# Patient Record
Sex: FEMALE | Race: WHITE
[De-identification: names, ages, dates, MRNs, and addresses within clinical notes are randomized per-mention and may not be internally consistent; named-entity substitution may affect disease eponyms.]

---

## 2017-03-13 ENCOUNTER — HOSPITAL (OUTPATIENT)
Dept: OTHER | Age: 62
End: 2017-03-13
Attending: INTERNAL MEDICINE

## 2019-09-24 PROBLEM — R45.89 ANXIETY ABOUT HEALTH: Status: ACTIVE | Noted: 2018-12-18

## 2019-09-24 PROBLEM — R07.89 ATYPICAL CHEST PAIN: Status: ACTIVE | Noted: 2017-06-29

## 2019-09-24 PROBLEM — R51.9 FACIAL DISCOMFORT: Status: ACTIVE | Noted: 2017-03-28

## 2019-09-24 PROBLEM — K58.2 IRRITABLE BOWEL SYNDROME WITH BOTH CONSTIPATION AND DIARRHEA: Status: ACTIVE | Noted: 2018-12-18

## 2019-09-24 PROBLEM — R14.0 BLOATING: Status: ACTIVE | Noted: 2018-12-18

## 2019-09-24 PROBLEM — N95.1 VAGINAL DRYNESS, MENOPAUSAL: Status: ACTIVE | Noted: 2018-01-02

## 2019-09-24 PROBLEM — N94.10 DYSPAREUNIA, FEMALE: Status: ACTIVE | Noted: 2018-01-02

## 2019-09-24 PROBLEM — F41.8 ANXIETY ABOUT HEALTH: Status: ACTIVE | Noted: 2018-12-18

## 2020-02-19 ENCOUNTER — TRANSCRIBE ORDERS (OUTPATIENT)
Dept: ADMINISTRATIVE | Facility: HOSPITAL | Age: 65
End: 2020-02-19

## 2020-02-19 DIAGNOSIS — M79.89 RIGHT LEG SWELLING: Primary | ICD-10-CM

## 2020-02-19 DIAGNOSIS — E04.1 THYROID NODULE: ICD-10-CM

## 2020-02-25 ENCOUNTER — HOSPITAL ENCOUNTER (OUTPATIENT)
Dept: CARDIOLOGY | Facility: HOSPITAL | Age: 65
Discharge: HOME | End: 2020-02-25
Admitting: NURSE PRACTITIONER

## 2020-02-25 ENCOUNTER — HOSPITAL ENCOUNTER (OUTPATIENT)
Dept: ULTRASOUND IMAGING | Facility: HOSPITAL | Age: 65
Discharge: HOME | End: 2020-02-25

## 2020-02-25 DIAGNOSIS — M79.89 RIGHT LEG SWELLING: ICD-10-CM

## 2020-02-25 DIAGNOSIS — E04.1 THYROID NODULE: ICD-10-CM

## 2020-02-25 LAB
BH CV LOWER VASCULAR LEFT COMMON FEMORAL AUGMENT: NORMAL
BH CV LOWER VASCULAR LEFT COMMON FEMORAL COMPETENT: NORMAL
BH CV LOWER VASCULAR LEFT COMMON FEMORAL COMPRESS: NORMAL
BH CV LOWER VASCULAR LEFT COMMON FEMORAL PHASIC: NORMAL
BH CV LOWER VASCULAR LEFT COMMON FEMORAL SPONT: NORMAL
BH CV LOWER VASCULAR RIGHT COMMON FEMORAL AUGMENT: NORMAL
BH CV LOWER VASCULAR RIGHT COMMON FEMORAL COMPETENT: NORMAL
BH CV LOWER VASCULAR RIGHT COMMON FEMORAL COMPRESS: NORMAL
BH CV LOWER VASCULAR RIGHT COMMON FEMORAL PHASIC: NORMAL
BH CV LOWER VASCULAR RIGHT COMMON FEMORAL SPONT: NORMAL
BH CV LOWER VASCULAR RIGHT DISTAL FEMORAL COMPRESS: NORMAL
BH CV LOWER VASCULAR RIGHT GASTRONEMIUS COMPRESS: NORMAL
BH CV LOWER VASCULAR RIGHT GREATER SAPH AK COMPRESS: NORMAL
BH CV LOWER VASCULAR RIGHT GREATER SAPH BK COMPRESS: NORMAL
BH CV LOWER VASCULAR RIGHT LESSER SAPH COMPRESS: NORMAL
BH CV LOWER VASCULAR RIGHT MID FEMORAL AUGMENT: NORMAL
BH CV LOWER VASCULAR RIGHT MID FEMORAL COMPETENT: NORMAL
BH CV LOWER VASCULAR RIGHT MID FEMORAL COMPRESS: NORMAL
BH CV LOWER VASCULAR RIGHT MID FEMORAL PHASIC: NORMAL
BH CV LOWER VASCULAR RIGHT MID FEMORAL SPONT: NORMAL
BH CV LOWER VASCULAR RIGHT PERONEAL COMPRESS: NORMAL
BH CV LOWER VASCULAR RIGHT POPLITEAL AUGMENT: NORMAL
BH CV LOWER VASCULAR RIGHT POPLITEAL COMPETENT: NORMAL
BH CV LOWER VASCULAR RIGHT POPLITEAL COMPRESS: NORMAL
BH CV LOWER VASCULAR RIGHT POPLITEAL PHASIC: NORMAL
BH CV LOWER VASCULAR RIGHT POPLITEAL SPONT: NORMAL
BH CV LOWER VASCULAR RIGHT POSTERIOR TIBIAL COMPRESS: NORMAL
BH CV LOWER VASCULAR RIGHT PROXIMAL FEMORAL COMPRESS: NORMAL
BH CV LOWER VASCULAR RIGHT SAPHENOFEMORAL JUNCTION AUGMENT: NORMAL
BH CV LOWER VASCULAR RIGHT SAPHENOFEMORAL JUNCTION COMPETENT: NORMAL
BH CV LOWER VASCULAR RIGHT SAPHENOFEMORAL JUNCTION COMPRESS: NORMAL
BH CV LOWER VASCULAR RIGHT SAPHENOFEMORAL JUNCTION PHASIC: NORMAL
BH CV LOWER VASCULAR RIGHT SAPHENOFEMORAL JUNCTION SPONT: NORMAL
MAXIMAL PREDICTED HEART RATE: 155 BPM
STRESS TARGET HR: 132 BPM

## 2020-02-25 PROCEDURE — 76536 US EXAM OF HEAD AND NECK: CPT

## 2020-02-25 PROCEDURE — 93971 EXTREMITY STUDY: CPT

## 2020-04-16 ENCOUNTER — OFFICE (OUTPATIENT)
Dept: URBAN - METROPOLITAN AREA CLINIC 64 | Facility: CLINIC | Age: 65
End: 2020-04-16

## 2020-04-16 VITALS — WEIGHT: 110 LBS | HEIGHT: 64 IN

## 2020-04-16 DIAGNOSIS — K59.00 CONSTIPATION, UNSPECIFIED: ICD-10-CM

## 2020-04-16 DIAGNOSIS — K64.8 OTHER HEMORRHOIDS: ICD-10-CM

## 2020-04-16 DIAGNOSIS — K21.9 GASTRO-ESOPHAGEAL REFLUX DISEASE WITHOUT ESOPHAGITIS: ICD-10-CM

## 2020-04-16 DIAGNOSIS — Z86.010 PERSONAL HISTORY OF COLONIC POLYPS: ICD-10-CM

## 2020-04-16 PROCEDURE — 99202 OFFICE O/P NEW SF 15 MIN: CPT | Mod: 95 | Performed by: INTERNAL MEDICINE

## 2020-04-16 RX ORDER — SODIUM SULFATE, POTASSIUM SULFATE, MAGNESIUM SULFATE 17.5; 3.13; 1.6 G/ML; G/ML; G/ML
SOLUTION, CONCENTRATE ORAL
Qty: 1 | Refills: 0 | Status: COMPLETED
Start: 2020-04-16 | End: 2020-10-23

## 2020-04-16 RX ORDER — POLYETHYLENE GLYCOL 3350 17 G/17G
17 POWDER, FOR SOLUTION ORAL
Qty: 1 | Refills: 11 | Status: COMPLETED
Start: 2020-04-16 | End: 2022-10-19

## 2020-04-16 RX ORDER — HYDROCORTISONE 25 MG/G
CREAM TOPICAL
Qty: 28.35 | Refills: 2 | Status: COMPLETED
Start: 2020-04-16 | End: 2024-06-17

## 2020-04-16 NOTE — SERVICEHPINOTES
This is a 65 y/o female who presents for initial evaluation.  She recently moved here in 8/19. She was [place] here. She c/o dyspepsia and change in bowel habits.  Attributes it to diet with increase in sugar consumption and decrease in exercise during the coronavirus pandemic. She previously tended to have constipation for which she takes Miralax.  She has titrated the dose down to 1 tsp qhs.  On this dose, she has regular BMs.  No blood in stool or melena. She uses protoscol HC prn hemorrhoids. She manages GERD with diet.  Exac by eating certain foods such as spicy foods, tomato based foods. She reports h/o adenomatous polyps and diverticulosis as well as int hemorrhoids.  Last colonoscopy 8/2017 in [place] at [facility].  Pt has records that she can copy and mail to us.  She had previously had 2 other colonoscopy exams.  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  She had polyps in 2014 as well.  No known family h/o CRCA or colon polyp.  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  She has colposcopy annually. enalapril dose is 5mg/d she can tolerate iv contrast if premedicated. She reports she is utd on immunizations re: splenectomy.

## 2020-07-09 PROBLEM — D72.829 LEUKOCYTOSIS: Status: ACTIVE | Noted: 2020-07-09

## 2020-07-09 PROBLEM — I10 ESSENTIAL HYPERTENSION: Status: ACTIVE | Noted: 2020-07-09

## 2020-07-09 PROBLEM — R63.4 WEIGHT DECREASED: Status: ACTIVE | Noted: 2020-07-09

## 2020-07-09 PROBLEM — B00.9 HERPESVIRUS INFECTION: Status: ACTIVE | Noted: 2020-07-09

## 2020-07-09 PROBLEM — Z90.81 HISTORY OF SPLENECTOMY: Status: ACTIVE | Noted: 2018-01-08

## 2020-07-09 PROBLEM — I83.90 VARICOSE VEINS OF LOWER EXTREMITY: Status: ACTIVE | Noted: 2018-01-08

## 2020-07-09 RX ORDER — CYCLOSPORINE 0.5 MG/ML
1 EMULSION OPHTHALMIC 3 TIMES DAILY
COMMUNITY

## 2020-07-09 RX ORDER — CLOBETASOL PROPIONATE 0.5 MG/G
1 OINTMENT TOPICAL AS NEEDED
COMMUNITY

## 2020-07-09 RX ORDER — GARLIC EXTRACT 500 MG
1 CAPSULE ORAL DAILY
COMMUNITY
End: 2022-10-10

## 2020-07-09 RX ORDER — MAGNESIUM 200 MG
100 TABLET ORAL DAILY
COMMUNITY

## 2020-07-09 RX ORDER — LORAZEPAM 0.5 MG/1
TABLET ORAL
COMMUNITY
End: 2020-07-10

## 2020-07-09 RX ORDER — ENALAPRIL MALEATE 5 MG/1
TABLET ORAL
COMMUNITY
End: 2020-07-10 | Stop reason: SDUPTHER

## 2020-07-09 RX ORDER — ACYCLOVIR 400 MG/1
TABLET ORAL
COMMUNITY
End: 2020-09-10 | Stop reason: SDUPTHER

## 2020-07-09 RX ORDER — NIACINAMIDE 500 MG
1 TABLET, EXTENDED RELEASE ORAL 3 TIMES DAILY
COMMUNITY

## 2020-07-10 ENCOUNTER — TELEMEDICINE (OUTPATIENT)
Dept: FAMILY MEDICINE CLINIC | Facility: CLINIC | Age: 65
End: 2020-07-10

## 2020-07-10 VITALS — SYSTOLIC BLOOD PRESSURE: 138 MMHG | DIASTOLIC BLOOD PRESSURE: 92 MMHG

## 2020-07-10 DIAGNOSIS — I71.40 ABDOMINAL AORTIC ANEURYSM (AAA) WITHOUT RUPTURE (HCC): ICD-10-CM

## 2020-07-10 DIAGNOSIS — F41.8 ANXIETY ABOUT HEALTH: ICD-10-CM

## 2020-07-10 DIAGNOSIS — I73.9 PERIPHERAL ARTERIAL DISEASE (HCC): Primary | ICD-10-CM

## 2020-07-10 DIAGNOSIS — K58.2 IRRITABLE BOWEL SYNDROME WITH BOTH CONSTIPATION AND DIARRHEA: ICD-10-CM

## 2020-07-10 DIAGNOSIS — I10 ESSENTIAL HYPERTENSION: ICD-10-CM

## 2020-07-10 DIAGNOSIS — R63.4 WEIGHT DECREASED: ICD-10-CM

## 2020-07-10 PROBLEM — H04.123 DRY EYES: Status: ACTIVE | Noted: 2020-07-10

## 2020-07-10 PROBLEM — A60.00 GENITAL HERPES: Status: ACTIVE | Noted: 2020-07-10

## 2020-07-10 PROBLEM — K83.4 SPHINCTER OF ODDI DYSFUNCTION: Status: ACTIVE | Noted: 2020-07-10

## 2020-07-10 PROBLEM — E04.9 GOITER: Status: ACTIVE | Noted: 2020-07-10

## 2020-07-10 PROBLEM — M81.0 OSTEOPOROSIS: Status: ACTIVE | Noted: 2020-07-10

## 2020-07-10 PROBLEM — C51.9 VULVAR CARCINOMA: Status: ACTIVE | Noted: 2020-07-10

## 2020-07-10 PROBLEM — K21.9 GERD (GASTROESOPHAGEAL REFLUX DISEASE): Status: ACTIVE | Noted: 2020-07-10

## 2020-07-10 PROBLEM — K57.90 DIVERTICULOSIS: Status: ACTIVE | Noted: 2020-07-10

## 2020-07-10 PROCEDURE — 99214 OFFICE O/P EST MOD 30 MIN: CPT | Performed by: FAMILY MEDICINE

## 2020-07-10 RX ORDER — DICYCLOMINE HCL 20 MG
20 TABLET ORAL EVERY 6 HOURS PRN
COMMUNITY
Start: 2020-06-17 | End: 2022-02-01 | Stop reason: SDUPTHER

## 2020-07-10 RX ORDER — ROSUVASTATIN CALCIUM 5 MG/1
5 TABLET, COATED ORAL
COMMUNITY
Start: 2020-06-18 | End: 2020-09-10 | Stop reason: SDUPTHER

## 2020-07-10 RX ORDER — ATORVASTATIN CALCIUM 20 MG/1
20 TABLET, FILM COATED ORAL
COMMUNITY
Start: 2020-06-03 | End: 2020-07-10

## 2020-07-10 RX ORDER — LORAZEPAM 1 MG/1
1 TABLET ORAL NIGHTLY PRN
COMMUNITY
End: 2020-09-10 | Stop reason: SDUPTHER

## 2020-07-10 RX ORDER — ENALAPRIL MALEATE 5 MG/1
TABLET ORAL
Start: 2020-07-10 | End: 2020-09-10 | Stop reason: SDUPTHER

## 2020-07-10 NOTE — PROGRESS NOTES
Subjective   Chief Complaint   Patient presents with   • Establish Care   • med review   • Hypertension   • Hyperlipidemia   • discuss test results     Shauna Marks is a 65 y.o. female.     Patient Care Team:  Talia Grey MD as PCP - General (Family Medicine)     You have chosen to receive care through a telehealth visit.  Do you consent to use a video/audio connection for your medical care today? Yes      Patient is being evaluated today through telehealth medicine appointment via the video in view COVID-19 pandemic.  She wants to get established and she wants to review her medical problems and concerns and some blood work results and ultrasounds, which he provided for me prior to this appointment.  She reports that she has been treated for hypertension for quite some time and she is currently on Vasotec 5 mg in the morning and 2-1/2 mg in the evening.  She also has issues with anxiety, GI problems, she is a smoker, however she is trying to cut back.  She apparently was found to have a small aortic aneurysm few years ago [place]up done there.  This was done in 2018.  She had follow-up ultrasound done in early June of this year and this showed that there is some progression of the plaque development in the iliac arteries.  She was then advised by her previous primary care office to start a statin, however she feels very uncomfortable to do that as her cholesterol looks very good and always looked very good.  She provided a copy of her most recent fasting lipid panel for review as well.  She is concerned to start any new medications as she has multiple allergies to medicines.  She has a lot of anxiety and worries about her health and the new medicines.  She has not pretty much left her house since the COVID-19 pandemic started and she feels uncomfortable to going even to doctors offices as she does not want to get this virus.  She denies any chest pains or shortness  of breath.  She also tells me that few years ago she had a colonoscopy and this was complicated by spleen rupture and she had her spleen removed at that time.       The following portions of the patient's history were reviewed and updated as appropriate: allergies, current medications, past family history, past medical history, past social history, past surgical history and problem list.  Past Medical History:   Diagnosis Date   • Anxiety    • GERD (gastroesophageal reflux disease)    • History of transfusion    • Hypertension    • IBS (irritable bowel syndrome)    • Osteoporosis      Past Surgical History:   Procedure Laterality Date   • BREAST SURGERY      biopsies   • CARDIAC CATHETERIZATION     • COLONOSCOPY  2014   • HERNIA REPAIR     • LASER ABLATION CONDYLOMA CERVICAL / VULVAR     • SKIN BIOPSY     • SPLENECTOMY       The patient has a family history of  Family History   Problem Relation Age of Onset   • Lung cancer Mother    • Coronary artery disease Father      Social History     Socioeconomic History   • Marital status: Significant Other     Spouse name: Not on file   • Number of children: Not on file   • Years of education: Not on file   • Highest education level: Not on file   Tobacco Use   • Smoking status: Current Every Day Smoker     Packs/day: 0.50     Types: Cigarettes   • Smokeless tobacco: Never Used   Substance and Sexual Activity   • Alcohol use: Yes     Comment: social   • Drug use: No   • Sexual activity: Defer       Review of Systems   Constitutional: Negative for activity change, appetite change, chills, fatigue, fever, unexpected weight gain and unexpected weight loss.   HENT: Negative for congestion, ear pain, hearing loss, nosebleeds, postnasal drip, swollen glands, tinnitus and trouble swallowing.    Eyes: Negative for blurred vision, double vision and visual disturbance.   Respiratory: Negative for cough, choking, chest tightness, shortness of breath, wheezing and stridor.     Cardiovascular: Negative for chest pain, palpitations and leg swelling.   Gastrointestinal: Positive for nausea and vomiting. Negative for abdominal distention, abdominal pain, anal bleeding, constipation, diarrhea, GERD and indigestion.   Endocrine: Negative for cold intolerance, heat intolerance and polyphagia.   Genitourinary: Negative for dysuria, flank pain, frequency, hematuria and urgency.   Musculoskeletal: Negative for arthralgias, back pain, gait problem, joint swelling and neck pain.   Skin: Negative for color change, dry skin, rash and skin lesions.   Allergic/Immunologic: Negative for environmental allergies and food allergies.   Neurological: Negative for dizziness, tremors, speech difficulty, light-headedness, numbness, headache and confusion.   Hematological: Negative for adenopathy. Does not bruise/bleed easily.   Psychiatric/Behavioral: Negative for decreased concentration, sleep disturbance, depressed mood and stress.     Visit Vitals  /92       Current Outpatient Medications:   •  LORazepam (ATIVAN) 1 MG tablet, Take 1 mg by mouth At Night As Needed., Disp: , Rfl:   •  acyclovir (ZOVIRAX) 400 MG tablet, acyclovir 400 mg tablet, Disp: , Rfl:   •  betamethasone valerate (VALISONE) 0.1 % ointment, betamethasone valerate 0.1 % topical ointment, Disp: , Rfl:   •  clobetasol (TEMOVATE) 0.05 % ointment, clobetasol 0.05 % topical ointment, Disp: , Rfl:   •  cycloSPORINE (Restasis) 0.05 % ophthalmic emulsion, Restasis 0.05 % eye drops in a dropperette, Disp: , Rfl:   •  dicyclomine (BENTYL) 20 MG tablet, Take 20 mg by mouth Every 6 (Six) Hours As Needed., Disp: , Rfl:   •  Digestive Enzymes capsule, , Disp: , Rfl:   •  enalapril (VASOTEC) 5 MG tablet, 1 pill p.o. in the morning and half a pill p.o. in the evening, Disp: , Rfl:   •  hydrocortisone 2.5 % cream, APPLY A SMALL AMOUNT AFFECTED AREA TWICE A DAY, Disp: , Rfl:   •  Lactobacillus Acid-Pectin (ACIDOPHILUS/PECTIN) capsule, Take 1 capsule by  mouth., Disp: , Rfl:   •  Loteprednol Etabonate (Lotemax) 0.5 % ointment, Lotemax 0.5 % eye ointment, Disp: , Rfl:   •  Magnesium 200 MG tablet, , Disp: , Rfl:   •  polyethylene glycol (MIRALAX) powder powder, , Disp: , Rfl:   •  Probiotic Product (PROBIOTIC-10 PO), , Disp: , Rfl:   •  rosuvastatin (CRESTOR) 5 MG tablet, Take 5 mg by mouth every night at bedtime., Disp: , Rfl:     Objective   Physical Exam   Constitutional: She is oriented to person, place, and time. She appears well-developed and well-nourished. No distress.   HENT:   Head: Normocephalic and atraumatic.   Eyes: Pupils are equal, round, and reactive to light. Conjunctivae and EOM are normal.   Neck: Normal range of motion. Neck supple.   Pulmonary/Chest: Effort normal. No respiratory distress.   Musculoskeletal: Normal range of motion.   Neurological: She is alert and oriented to person, place, and time. No cranial nerve deficit.   Skin: She is not diaphoretic.   Psychiatric: She has a normal mood and affect. Judgment and thought content normal.            No visits with results within 7 Day(s) from this visit.   Latest known visit with results is:   Hospital Outpatient Visit on 02/25/2020   Component Date Value Ref Range Status   • Target HR (85%) 02/25/2020 132  bpm Final   • Max. Pred. HR (100%) 02/25/2020 155  bpm Final   • Right Common Femoral Spont 02/25/2020 Y   Final   • Right Common Femoral Phasic 02/25/2020 Y   Final   • Right Common Femoral Augment 02/25/2020 Y   Final   • Right Common Femoral Competent 02/25/2020 Y   Final   • Right Common Femoral Compress 02/25/2020 C   Final   • Right Saphenofemoral Junction Spont 02/25/2020 Y   Final   • Right Saphenofemoral Junction Phas* 02/25/2020 Y   Final   • Right Saphenofemoral Junction Augm* 02/25/2020 Y   Final   • Right Saphenofemoral Junction Comp* 02/25/2020 Y   Final   • Right Saphenofemoral Junction Comp* 02/25/2020 C   Final   • Right Proximal Femoral Compress 02/25/2020 C   Final   •  Right Mid Femoral Spont 02/25/2020 Y   Final   • Right Mid Femoral Phasic 02/25/2020 Y   Final   • Right Mid Femoral Augment 02/25/2020 Y   Final   • Right Mid Femoral Competent 02/25/2020 Y   Final   • Right Mid Femoral Compress 02/25/2020 C   Final   • Right Distal Femoral Compress 02/25/2020 C   Final   • Right Popliteal Spont 02/25/2020 Y   Final   • Right Popliteal Phasic 02/25/2020 Y   Final   • Right Popliteal Augment 02/25/2020 Y   Final   • Right Popliteal Competent 02/25/2020 Y   Final   • Right Popliteal Compress 02/25/2020 C   Final   • Right Posterior Tibial Compress 02/25/2020 C   Final   • Right Peroneal Compress 02/25/2020 C   Final   • Right GastronemiusSoleal Compress 02/25/2020 C   Final   • Right Greater Saph AK Compress 02/25/2020 C   Final   • Right Greater Saph BK Compress 02/25/2020 C   Final   • Right Lesser Saph Compress 02/25/2020 C   Final   • Left Common Femoral Spont 02/25/2020 Y   Final   • Left Common Femoral Phasic 02/25/2020 Y   Final   • Left Common Femoral Augment 02/25/2020 Y   Final   • Left Common Femoral Competent 02/25/2020 Y   Final   • Left Common Femoral Compress 02/25/2020 C   Final         Lab Results   Component Value Date     (H) 05/21/2020    BUN 11 05/21/2020    CREATININE 0.7 05/21/2020     05/21/2020    K 4.9 05/21/2020    CL 98 05/21/2020    CALCIUM 10.1 05/21/2020    ALBUMIN 4.7 05/21/2020    BILITOT 0.4 05/21/2020    ALKPHOS 73 05/21/2020    AST 33 05/21/2020    ALT 15 05/21/2020    CHLPL 190 05/21/2020    TRIG 58 05/21/2020    HDL 68 05/21/2020    VLDL 12 05/21/2020     (H) 05/21/2020    WBC 11.46 (H) 05/21/2020    RBC 4.62 05/21/2020    HCT 46.4 (H) 05/21/2020    .4 (H) 05/21/2020    MCH 32.9 05/21/2020          Assessment/Plan   Problems Addressed this Visit        Cardiovascular and Mediastinum    Essential hypertension    Relevant Medications    enalapril (VASOTEC) 5 MG tablet    Abdominal aortic aneurysm (AAA) (CMS/[facility])     Peripheral arterial disease (CMS/HCC) - Primary    Relevant Orders    Ambulatory Referral to Vascular Surgery       Digestive    Irritable bowel syndrome with both constipation and diarrhea       Other    Anxiety about health    Weight decreased        I reviewed her medical problems and her medications and I tried to address all of her concerns as well as are good at this one video appointment.  I reviewed the records she provided for me prior to that appointment including 2 different abdominal ultrasounds from 2018 and one from  of this year.  There seems to be a progression of the plaque buildup in the iliac artery.  Her fasting lipid panel looks very good, however I tried to explain to her that even though her numbers look good there is still a progress of the deposition of cholesterol in her veins and concerned about the progression if we do not modify and control medical risks including her hypertension, cholesterol, and the smoking, which is a future risk of that.  Smoking cessation was discussed and stressed.  We went back and forth for quite some time asking and answering questions.  I also suggested for her possibly to see a vascular specialist to address that and she agreed to that and referral was put in.  I asked for her to provide more medical records if available including her most recent colonoscopy.  She will be scheduling another follow-up appointment with me for reevaluation ideally in person, however if not comfortable with that then she can do video appointment in about 1 month.      Patient was evaluated today through telehealth medicine appointment via the video.  Total evaluation time was 45 minutes.             Requested Prescriptions     Signed Prescriptions Disp Refills   • enalapril (VASOTEC) 5 MG tablet       Si pill p.o. in the morning and half a pill p.o. in the evening

## 2020-07-11 PROBLEM — R07.89 ATYPICAL CHEST PAIN: Status: RESOLVED | Noted: 2017-06-29 | Resolved: 2020-07-11

## 2020-07-11 PROBLEM — R51.9 FACIAL DISCOMFORT: Status: RESOLVED | Noted: 2017-03-28 | Resolved: 2020-07-11

## 2020-07-11 PROBLEM — C51.9 VULVAR CARCINOMA: Status: RESOLVED | Noted: 2020-07-10 | Resolved: 2020-07-11

## 2020-07-11 PROBLEM — I73.9 PERIPHERAL ARTERIAL DISEASE: Status: ACTIVE | Noted: 2020-07-11

## 2020-08-25 ENCOUNTER — APPOINTMENT (OUTPATIENT)
Dept: VASCULAR SURGERY | Facility: HOSPITAL | Age: 65
End: 2020-08-25

## 2020-08-25 PROCEDURE — G0463 HOSPITAL OUTPT CLINIC VISIT: HCPCS

## 2020-09-04 ENCOUNTER — TELEMEDICINE (OUTPATIENT)
Dept: FAMILY MEDICINE CLINIC | Facility: CLINIC | Age: 65
End: 2020-09-04

## 2020-09-04 DIAGNOSIS — M54.9 UPPER BACK PAIN ON RIGHT SIDE: Primary | ICD-10-CM

## 2020-09-04 DIAGNOSIS — R07.89 CHEST WALL PAIN: ICD-10-CM

## 2020-09-04 PROBLEM — I70.90 ATHEROSCLEROSIS: Status: ACTIVE | Noted: 2020-09-04

## 2020-09-04 PROCEDURE — 99213 OFFICE O/P EST LOW 20 MIN: CPT | Performed by: FAMILY MEDICINE
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
## 2020-09-10 ENCOUNTER — LAB (OUTPATIENT)
Dept: FAMILY MEDICINE CLINIC | Facility: CLINIC | Age: 65
End: 2020-09-10

## 2020-09-10 ENCOUNTER — OFFICE VISIT (OUTPATIENT)
Dept: FAMILY MEDICINE CLINIC | Facility: CLINIC | Age: 65
End: 2020-09-10

## 2020-09-10 VITALS
WEIGHT: 105 LBS | TEMPERATURE: 97.5 F | OXYGEN SATURATION: 98 % | HEIGHT: 64 IN | RESPIRATION RATE: 16 BRPM | HEART RATE: 76 BPM | SYSTOLIC BLOOD PRESSURE: 144 MMHG | DIASTOLIC BLOOD PRESSURE: 73 MMHG | BODY MASS INDEX: 17.93 KG/M2

## 2020-09-10 DIAGNOSIS — I10 ESSENTIAL HYPERTENSION: ICD-10-CM

## 2020-09-10 DIAGNOSIS — Z78.0 POSTMENOPAUSAL: ICD-10-CM

## 2020-09-10 DIAGNOSIS — Z00.00 MEDICARE ANNUAL WELLNESS VISIT, SUBSEQUENT: Primary | ICD-10-CM

## 2020-09-10 DIAGNOSIS — Z12.31 VISIT FOR SCREENING MAMMOGRAM: ICD-10-CM

## 2020-09-10 DIAGNOSIS — F41.9 ANXIETY: ICD-10-CM

## 2020-09-10 DIAGNOSIS — E55.9 VITAMIN D DEFICIENCY: ICD-10-CM

## 2020-09-10 DIAGNOSIS — I73.9 PERIPHERAL ARTERIAL DISEASE (HCC): ICD-10-CM

## 2020-09-10 DIAGNOSIS — L65.9 HAIR LOSS: ICD-10-CM

## 2020-09-10 LAB
25(OH)D3 SERPL-MCNC: 48.9 NG/ML (ref 30–100)
ALBUMIN SERPL-MCNC: 4.8 G/DL (ref 3.5–5.2)
ALBUMIN/GLOB SERPL: 1.7 G/DL
ALP SERPL-CCNC: 75 U/L (ref 39–117)
ALT SERPL W P-5'-P-CCNC: 15 U/L (ref 1–33)
ANION GAP SERPL CALCULATED.3IONS-SCNC: 9.9 MMOL/L (ref 5–15)
AST SERPL-CCNC: 25 U/L (ref 1–32)
BASOPHILS # BLD AUTO: 0.15 10*3/MM3 (ref 0–0.2)
BASOPHILS NFR BLD AUTO: 1.2 % (ref 0–1.5)
BILIRUB SERPL-MCNC: 0.4 MG/DL (ref 0–1.2)
BILIRUB UR QL STRIP: NEGATIVE
BUN SERPL-MCNC: 10 MG/DL (ref 8–23)
BUN/CREAT SERPL: 13 (ref 7–25)
CALCIUM SPEC-SCNC: 9.5 MG/DL (ref 8.6–10.5)
CHLORIDE SERPL-SCNC: 99 MMOL/L (ref 98–107)
CHOLEST SERPL-MCNC: 138 MG/DL (ref 0–200)
CHROMATIN AB SERPL-ACNC: <10 IU/ML (ref 0–14)
CLARITY UR: CLEAR
CO2 SERPL-SCNC: 29.1 MMOL/L (ref 22–29)
COLOR UR: YELLOW
CREAT SERPL-MCNC: 0.77 MG/DL (ref 0.57–1)
CRP SERPL-MCNC: 0.09 MG/DL (ref 0–0.5)
DEPRECATED RDW RBC AUTO: 43.2 FL (ref 37–54)
EOSINOPHIL # BLD AUTO: 0.14 10*3/MM3 (ref 0–0.4)
EOSINOPHIL NFR BLD AUTO: 1.1 % (ref 0.3–6.2)
ERYTHROCYTE [DISTWIDTH] IN BLOOD BY AUTOMATED COUNT: 12.2 % (ref 12.3–15.4)
ERYTHROCYTE [SEDIMENTATION RATE] IN BLOOD: 5 MM/HR (ref 0–30)
FERRITIN SERPL-MCNC: 121 NG/ML (ref 13–150)
GFR SERPL CREATININE-BSD FRML MDRD: 75 ML/MIN/1.73
GLOBULIN UR ELPH-MCNC: 2.9 GM/DL
GLUCOSE SERPL-MCNC: 102 MG/DL (ref 65–99)
GLUCOSE UR STRIP-MCNC: NEGATIVE MG/DL
HCT VFR BLD AUTO: 41.5 % (ref 34–46.6)
HDLC SERPL-MCNC: 59 MG/DL (ref 40–60)
HGB BLD-MCNC: 14.1 G/DL (ref 12–15.9)
HGB UR QL STRIP.AUTO: NEGATIVE
IMM GRANULOCYTES # BLD AUTO: 0.04 10*3/MM3 (ref 0–0.05)
IMM GRANULOCYTES NFR BLD AUTO: 0.3 % (ref 0–0.5)
IRON 24H UR-MRATE: 88 MCG/DL (ref 37–145)
IRON SATN MFR SERPL: 23 % (ref 20–50)
KETONES UR QL STRIP: NEGATIVE
LDLC SERPL CALC-MCNC: 68 MG/DL (ref 0–100)
LDLC/HDLC SERPL: 1.15 {RATIO}
LEUKOCYTE ESTERASE UR QL STRIP.AUTO: NEGATIVE
LYMPHOCYTES # BLD AUTO: 2.68 10*3/MM3 (ref 0.7–3.1)
LYMPHOCYTES NFR BLD AUTO: 21 % (ref 19.6–45.3)
MCH RBC QN AUTO: 32.6 PG (ref 26.6–33)
MCHC RBC AUTO-ENTMCNC: 34 G/DL (ref 31.5–35.7)
MCV RBC AUTO: 96.1 FL (ref 79–97)
MONOCYTES # BLD AUTO: 1.04 10*3/MM3 (ref 0.1–0.9)
MONOCYTES NFR BLD AUTO: 8.2 % (ref 5–12)
NEUTROPHILS NFR BLD AUTO: 68.2 % (ref 42.7–76)
NEUTROPHILS NFR BLD AUTO: 8.69 10*3/MM3 (ref 1.7–7)
NITRITE UR QL STRIP: NEGATIVE
NRBC BLD AUTO-RTO: 0 /100 WBC (ref 0–0.2)
PH UR STRIP.AUTO: 7.5 [PH] (ref 5–8)
PLATELET # BLD AUTO: 297 10*3/MM3 (ref 140–450)
PMV BLD AUTO: 12.9 FL (ref 6–12)
POTASSIUM SERPL-SCNC: 4.2 MMOL/L (ref 3.5–5.2)
PROT SERPL-MCNC: 7.7 G/DL (ref 6–8.5)
PROT UR QL STRIP: NEGATIVE
RBC # BLD AUTO: 4.32 10*6/MM3 (ref 3.77–5.28)
SODIUM SERPL-SCNC: 138 MMOL/L (ref 136–145)
SP GR UR STRIP: 1.01 (ref 1–1.03)
TIBC SERPL-MCNC: 383 MCG/DL (ref 298–536)
TRANSFERRIN SERPL-MCNC: 257 MG/DL (ref 200–360)
TRIGL SERPL-MCNC: 55 MG/DL (ref 0–150)
TSH SERPL DL<=0.05 MIU/L-ACNC: 1.08 UIU/ML (ref 0.27–4.2)
UROBILINOGEN UR QL STRIP: NORMAL
VLDLC SERPL-MCNC: 11 MG/DL (ref 5–40)
WBC # BLD AUTO: 12.74 10*3/MM3 (ref 3.4–10.8)

## 2020-09-10 PROCEDURE — G0439 PPPS, SUBSEQ VISIT: HCPCS | Performed by: FAMILY MEDICINE

## 2020-09-10 PROCEDURE — 85652 RBC SED RATE AUTOMATED: CPT | Performed by: FAMILY MEDICINE

## 2020-09-10 PROCEDURE — 84443 ASSAY THYROID STIM HORMONE: CPT | Performed by: FAMILY MEDICINE

## 2020-09-10 PROCEDURE — 36415 COLL VENOUS BLD VENIPUNCTURE: CPT | Performed by: FAMILY MEDICINE

## 2020-09-10 PROCEDURE — 85025 COMPLETE CBC W/AUTO DIFF WBC: CPT | Performed by: FAMILY MEDICINE

## 2020-09-10 PROCEDURE — 82728 ASSAY OF FERRITIN: CPT | Performed by: FAMILY MEDICINE

## 2020-09-10 PROCEDURE — 86140 C-REACTIVE PROTEIN: CPT | Performed by: FAMILY MEDICINE

## 2020-09-10 PROCEDURE — 86431 RHEUMATOID FACTOR QUANT: CPT | Performed by: FAMILY MEDICINE

## 2020-09-10 PROCEDURE — 80053 COMPREHEN METABOLIC PANEL: CPT | Performed by: FAMILY MEDICINE

## 2020-09-10 PROCEDURE — 84466 ASSAY OF TRANSFERRIN: CPT | Performed by: FAMILY MEDICINE

## 2020-09-10 PROCEDURE — 80061 LIPID PANEL: CPT | Performed by: FAMILY MEDICINE

## 2020-09-10 PROCEDURE — 86038 ANTINUCLEAR ANTIBODIES: CPT | Performed by: FAMILY MEDICINE

## 2020-09-10 PROCEDURE — 82306 VITAMIN D 25 HYDROXY: CPT | Performed by: FAMILY MEDICINE

## 2020-09-10 PROCEDURE — 81003 URINALYSIS AUTO W/O SCOPE: CPT | Performed by: FAMILY MEDICINE

## 2020-09-10 PROCEDURE — 83540 ASSAY OF IRON: CPT | Performed by: FAMILY MEDICINE

## 2020-09-10 RX ORDER — LORAZEPAM 1 MG/1
1 TABLET ORAL NIGHTLY PRN
Qty: 45 TABLET | Refills: 0 | Status: SHIPPED | OUTPATIENT
Start: 2020-09-10 | End: 2020-10-06 | Stop reason: SDUPTHER

## 2020-09-10 RX ORDER — ENALAPRIL MALEATE 5 MG/1
TABLET ORAL
Qty: 135 TABLET | Refills: 1 | Status: SHIPPED | OUTPATIENT
Start: 2020-09-10 | End: 2021-03-04 | Stop reason: SDUPTHER

## 2020-09-10 RX ORDER — ACYCLOVIR 400 MG/1
400 TABLET ORAL DAILY
Qty: 90 TABLET | Refills: 1 | Status: SHIPPED | OUTPATIENT
Start: 2020-09-10 | End: 2020-10-30 | Stop reason: SDUPTHER

## 2020-09-10 RX ORDER — ROSUVASTATIN CALCIUM 5 MG/1
5 TABLET, COATED ORAL
Qty: 90 TABLET | Refills: 1 | Status: SHIPPED | OUTPATIENT
Start: 2020-09-10 | End: 2021-04-14 | Stop reason: SDUPTHER

## 2020-09-10 NOTE — PROGRESS NOTES
Subsequent Medicare Wellness Visit   The ABC's of the Annual Wellness Visit    Chief Complaint   Patient presents with   • Medicare Wellness-subsequent       HPI:  Shauna Marks, -1955, is a 65 y.o. female who presents for a Subsequent Medicare Wellness Visit.  She is overall doing pretty good, she was recently started on Crestor due to some calcium buildup on her recent vascular ultrasounds.  She is still taking only 2.5 mg as she is very much concerned about possible side effects.  She has noted for some time some hair loss and she is concerned that this might be due to some of her medicines.  Specifically she thinks this might be due to enalapril.  She is a smoker and has been for a long time.  She fairly recently moved to this area and she tells me that her last mammogram was in 2019.  She is due for colonoscopy and she already scheduled the test to be done later this year.  She reports that her last bone density was about 2 years ago and she had some bone loss noted.  She denies any issues with depression or memory problems.  She however has a lot of anxiety, which typically gets worse at night and she takes lorazepam at night as needed, however she is trying not to take it every night.  She denies any balance issues or any falls. Patient denies any chest pain, shortness of breath, dizziness, nausea, vomiting, or diarrhea. No visual issues reported. No headaches, numbness or tingling. No urinary issues. Patient has good appetite and denies any weight changes. No swelling reported. No emotional issues.      Recent Hospitalizations:  No hospitalization(s) within the last year..    Current Medical Providers:  Patient Care Team:  Talia Grey MD as PCP - General (Family Medicine)  Asa Greenwood MD as Consulting Physician (Vascular Surgery)    Health Habits and Functional and Cognitive Screening and Depression Screening:  Functional & Cognitive Status 9/10/2020   Do you have  "difficulty preparing food and eating? No   Do you have difficulty bathing yourself, getting dressed or grooming yourself? No   Do you have difficulty using the toilet? No   Do you have difficulty moving around from place to place? No   Do you have trouble with steps or getting out of a bed or a chair? No   Current Diet Well Balanced Diet   Dental Exam Up to date   Eye Exam Up to date   Exercise (times per week) 0 times per week   Current Exercise Activities Include None   Do you need help using the phone?  No   Are you deaf or do you have serious difficulty hearing?  No   Do you need help with transportation? No   Do you need help shopping? No   Do you need help preparing meals?  No   Do you need help with housework?  No   Do you need help with laundry? No   Do you need help taking your medications? No   Do you need help managing money? No   Do you ever drive or ride in a car without wearing a seat belt? No   Have you felt unusual stress, anger or loneliness in the last month? No   Who do you live with? Spouse   If you need help, do you have trouble finding someone available to you? No   Have you been bothered in the last four weeks by sexual problems? No   Do you have difficulty concentrating, remembering or making decisions? No       Compared to one year ago, the patient feels her physical health is the same and her mental health is the same.    Depression Screen:  PHQ-2/PHQ-9 Depression Screening 9/10/2020   Little interest or pleasure in doing things 0   Feeling down, depressed, or hopeless 0   Total Score 0         Past Medical/Family/Social History:  The following portions of the patient's history were reviewed and updated as appropriate: allergies, current medications, past family history, past medical history, past social history, past surgical history and problem list.    Allergies   Allergen Reactions   • Amoxicillin Hives and Swelling     Hives, swelling   • Cefoxitin Other (See Comments)     \"blood " "pressure bottoms out\"   • Ciprofloxacin Rash   • Contrast Dye Hives, Nausea And Vomiting and Shortness Of Breath   • Hydromorphone Hcl Hallucinations and Rash   • Iodinated Diagnostic Agents Rash   • Propafenone Other (See Comments)     Facial numbness   • Propofol Headache   • Azithromycin Unknown - High Severity   • Cephalexin Unknown - High Severity   • Fentanyl Other (See Comments)   • Hydromorphone Confusion   • Sulfa Antibiotics Nausea And Vomiting   • Sulfacetamide Nausea And Vomiting   • Latex Rash         Current Outpatient Medications:   •  acyclovir (ZOVIRAX) 400 MG tablet, Take 1 tablet by mouth Daily. Take no more than 5 doses a day., Disp: 90 tablet, Rfl: 1  •  betamethasone valerate (VALISONE) 0.1 % ointment, betamethasone valerate 0.1 % topical ointment, Disp: , Rfl:   •  clobetasol (TEMOVATE) 0.05 % ointment, clobetasol 0.05 % topical ointment, Disp: , Rfl:   •  cycloSPORINE (Restasis) 0.05 % ophthalmic emulsion, Restasis 0.05 % eye drops in a dropperette, Disp: , Rfl:   •  dicyclomine (BENTYL) 20 MG tablet, Take 20 mg by mouth Every 6 (Six) Hours As Needed., Disp: , Rfl:   •  Digestive Enzymes capsule, , Disp: , Rfl:   •  enalapril (VASOTEC) 5 MG tablet, 1 pill p.o. in the morning and half a pill p.o. in the evening, Disp: 135 tablet, Rfl: 1  •  hydrocortisone 2.5 % cream, APPLY A SMALL AMOUNT AFFECTED AREA TWICE A DAY, Disp: , Rfl:   •  Lactobacillus Acid-Pectin (ACIDOPHILUS/PECTIN) capsule, Take 1 capsule by mouth., Disp: , Rfl:   •  LORazepam (ATIVAN) 1 MG tablet, Take 1 tablet by mouth At Night As Needed for Anxiety., Disp: 45 tablet, Rfl: 0  •  Loteprednol Etabonate (Lotemax) 0.5 % ointment, Lotemax 0.5 % eye ointment, Disp: , Rfl:   •  Magnesium 200 MG tablet, , Disp: , Rfl:   •  polyethylene glycol (MIRALAX) powder powder, , Disp: , Rfl:   •  Probiotic Product (PROBIOTIC-10 PO), , Disp: , Rfl:   •  rosuvastatin (CRESTOR) 5 MG tablet, Take 1 tablet by mouth every night at bedtime., Disp: 90 " tablet, Rfl: 1    Aspirin use counseling: Does not need ASA (and currently is not on it)    Current medication list contains no high risk medications.  No harmful drug interactions have been identified.     Family History   Problem Relation Age of Onset   • Lung cancer Mother    • Coronary artery disease Father        Social History     Tobacco Use   • Smoking status: Current Every Day Smoker     Packs/day: 0.50     Types: Cigarettes   • Smokeless tobacco: Never Used   Substance Use Topics   • Alcohol use: Yes     Comment: social       Past Surgical History:   Procedure Laterality Date   • BREAST SURGERY      biopsies   • CARDIAC CATHETERIZATION     • COLONOSCOPY  2014   • HERNIA REPAIR     • LASER ABLATION CONDYLOMA CERVICAL / VULVAR     • SKIN BIOPSY     • SPLENECTOMY  2014       Patient Active Problem List   Diagnosis   • Anxiety about health   • Bloating   • Carcinoma in situ of vulva   • Dyspareunia, female   • Fatigue   • Hypertrophic scar of skin   • Irritable bowel syndrome with both constipation and diarrhea   • Vaginal dryness, menopausal   • Weight decreased   • Varicose veins of lower extremity   • OAB (overactive bladder)   • Leukocytosis   • History of splenectomy   • Herpesvirus infection   • Essential hypertension   • Abdominal aortic aneurysm (AAA) (CMS/HCC)   • Sphincter of Oddi dysfunction   • Osteoporosis   • Goiter   • GERD (gastroesophageal reflux disease)   • Genital herpes   • Dry eyes   • Diverticulosis   • Peripheral arterial disease (CMS/HCC)   • Atherosclerosis   • Chest wall pain   • Upper back pain on right side   • Medicare annual wellness visit, subsequent   • Vitamin D deficiency   • Visit for screening mammogram   • Postmenopausal   • Hair loss   • Anxiety       Review of Systems   Constitutional: Negative for activity change, fatigue and fever.   Respiratory: Negative for cough, shortness of breath and wheezing.    Cardiovascular: Negative for chest pain, palpitations and leg  "swelling.   Gastrointestinal: Negative for constipation, diarrhea and indigestion.   Skin: Negative for color change, dry skin and rash.   Neurological: Negative for tremors and headache.       Objective     Vitals:    09/10/20 0941   BP: 144/73   BP Location: Left arm   Patient Position: Sitting   Cuff Size: Adult   Pulse: 76   Resp: 16   Temp: 97.5 °F (36.4 °C)   TempSrc: Temporal   SpO2: 98%   Weight: 47.6 kg (105 lb)   Height: 162.6 cm (64\")       Patient's Body mass index is 18.02 kg/m². BMI is above normal parameters. Recommendations include: educational material.      No exam data present    The patient has no evidence of cognitve impairment.     Physical Exam   Constitutional: She is oriented to person, place, and time. She appears well-developed and well-nourished.   HENT:   Head: Normocephalic and atraumatic.   Eyes: Pupils are equal, round, and reactive to light. Conjunctivae and EOM are normal.   Neck: Normal range of motion. Neck supple.   Cardiovascular: Normal rate, regular rhythm, normal heart sounds and intact distal pulses. Exam reveals no gallop.   No murmur heard.  Pulmonary/Chest: Effort normal and breath sounds normal. No respiratory distress. She has no rales. She exhibits no tenderness.   Musculoskeletal: Normal range of motion. She exhibits no edema or deformity.   Neurological: She is alert and oriented to person, place, and time.   Skin: Skin is warm and dry.   Nursing note and vitals reviewed.      Recent Lab Results:  Lab Results   Component Value Date     (H) 05/21/2020     Lab Results   Component Value Date    TRIG 58 05/21/2020    HDL 68 05/21/2020    VLDL 12 05/21/2020       Assessment/Plan   Age-appropriate Screening Schedule:  Refer to the list below for future screening recommendations based on patient's age, sex and/or medical conditions.      Health Maintenance   Topic Date Due   • MAMMOGRAM  1955   • TDAP/TD VACCINES (1 - Tdap) 04/28/1965   • ZOSTER VACCINE (1 of " 2) 04/28/2004   • DXA SCAN  07/09/2020   • COLONOSCOPY  07/09/2020   • INFLUENZA VACCINE  08/01/2020       Medicare Risks and Personalized Health Plan:  Advance Directive Discussion  Breast Cancer/Mammogram Screening  Cardiovascular risk  Chronic Pain   Colon Cancer Screening  Dementia/Memory   Depression/Dysphoria  Diabetic Lab Screening   Fall Risk  Osteoprorosis Risk      CMS-Preventive Services Quick Reference  Medicare Preventive Services Addressed:  Annual Wellness Visit (AWV)  Bone Density Measurements  Cardiovascular Disease Screening Tests (may do this order every 5 years in beneficiaries without signs or symptoms of cardiovascular disease)  Colorectal Cancer Screening, Colonoscopy  Screening Mammography     Advance Care Planning:  ACP discussion was held with the patient during this visit. Patient has an advance directive in EMR which is still valid.     Diagnoses and all orders for this visit:    1. Medicare annual wellness visit, subsequent (Primary)    2. Peripheral arterial disease (CMS/HCC)    3. Essential hypertension  -     CBC Auto Differential  -     Comprehensive Metabolic Panel  -     Lipid Panel  -     TSH  -     Urinalysis With Culture If Indicated - Urine, Clean Catch    4. Vitamin D deficiency  -     Vitamin D 25 Hydroxy    5. Postmenopausal  -     DEXA Bone Density Axial; Future    6. Visit for screening mammogram  -     Mammo Screening Digital Tomosynthesis Bilateral With CAD; Future    7. Hair loss  -     Ferritin  -     Iron Profile  -     MELODIE; Future  -     Sedimentation Rate  -     Rheumatoid Factor; Future  -     C-reactive Protein    8. Anxiety  -     LORazepam (ATIVAN) 1 MG tablet; Take 1 tablet by mouth At Night As Needed for Anxiety.  Dispense: 45 tablet; Refill: 0    Other orders  -     rosuvastatin (CRESTOR) 5 MG tablet; Take 1 tablet by mouth every night at bedtime.  Dispense: 90 tablet; Refill: 1  -     acyclovir (ZOVIRAX) 400 MG tablet; Take 1 tablet by mouth Daily. Take no  more than 5 doses a day.  Dispense: 90 tablet; Refill: 1  -     enalapril (VASOTEC) 5 MG tablet; 1 pill p.o. in the morning and half a pill p.o. in the evening  Dispense: 135 tablet; Refill: 1    Medicare wellness exam was done today.  I reviewed her medical problems and her medications.  I will be getting fasting blood work today.  I also reviewed her health maintenance.  She was given order for mammogram and a bone density and she will be scheduling these tests down the road.  She is scheduled for colonoscopy later this year as well.  I also reviewed her immunization.  She was advised to get a flu shot and she wants to do it later into this season.  She also provided some immunization records and it looks like she already had both of her pneumonia shots.  She does not want to get a shingles shot at this point.  Smoking cessation was discussed and recommended.  Healthy lifestyle was also discussed and reinforced.    An After Visit Summary and PPPS with all of these plans were given to the patient.      Follow Up:  Return in about 6 months (around 3/10/2021) for Next scheduled follow up.

## 2020-09-11 LAB — ANA SER QL: NEGATIVE

## 2020-10-06 ENCOUNTER — CLINICAL SUPPORT (OUTPATIENT)
Dept: FAMILY MEDICINE CLINIC | Facility: CLINIC | Age: 65
End: 2020-10-06

## 2020-10-06 DIAGNOSIS — Z23 NEED FOR IMMUNIZATION AGAINST INFLUENZA: Primary | ICD-10-CM

## 2020-10-06 DIAGNOSIS — F41.9 ANXIETY: ICD-10-CM

## 2020-10-06 PROCEDURE — 90686 IIV4 VACC NO PRSV 0.5 ML IM: CPT | Performed by: FAMILY MEDICINE

## 2020-10-06 PROCEDURE — G0008 ADMIN INFLUENZA VIRUS VAC: HCPCS | Performed by: FAMILY MEDICINE

## 2020-10-06 RX ORDER — LORAZEPAM 1 MG/1
1 TABLET ORAL NIGHTLY PRN
Qty: 30 TABLET | Refills: 0 | Status: SHIPPED | OUTPATIENT
Start: 2020-10-06 | End: 2020-11-08

## 2020-10-19 RX ORDER — ASCORBIC ACID 500 MG
1000 TABLET ORAL DAILY
COMMUNITY

## 2020-10-19 RX ORDER — MULTIPLE VITAMINS W/ MINERALS TAB 9MG-400MCG
1 TAB ORAL DAILY
COMMUNITY

## 2020-10-19 RX ORDER — UBIDECARENONE 100 MG
100 CAPSULE ORAL DAILY
COMMUNITY
End: 2021-03-16

## 2020-10-23 ENCOUNTER — OFFICE (OUTPATIENT)
Dept: URBAN - METROPOLITAN AREA CLINIC 64 | Facility: CLINIC | Age: 65
End: 2020-10-23

## 2020-10-23 VITALS — WEIGHT: 106 LBS | HEIGHT: 64 IN

## 2020-10-23 DIAGNOSIS — F41.9 ANXIETY DISORDER, UNSPECIFIED: ICD-10-CM

## 2020-10-23 DIAGNOSIS — K57.90 DIVERTICULOSIS OF INTESTINE, PART UNSPECIFIED, WITHOUT PERFO: ICD-10-CM

## 2020-10-23 DIAGNOSIS — K59.00 CONSTIPATION, UNSPECIFIED: ICD-10-CM

## 2020-10-23 DIAGNOSIS — Z86.010 PERSONAL HISTORY OF COLONIC POLYPS: ICD-10-CM

## 2020-10-23 PROCEDURE — 99213 OFFICE O/P EST LOW 20 MIN: CPT | Performed by: INTERNAL MEDICINE

## 2020-10-26 ENCOUNTER — LAB (OUTPATIENT)
Dept: LAB | Facility: HOSPITAL | Age: 65
End: 2020-10-26

## 2020-10-26 PROCEDURE — U0004 COV-19 TEST NON-CDC HGH THRU: HCPCS

## 2020-10-26 PROCEDURE — C9803 HOPD COVID-19 SPEC COLLECT: HCPCS

## 2020-10-27 ENCOUNTER — ANESTHESIA EVENT (OUTPATIENT)
Dept: GASTROENTEROLOGY | Facility: HOSPITAL | Age: 65
End: 2020-10-27

## 2020-10-27 LAB — SARS-COV-2 RNA RESP QL NAA+PROBE: NOT DETECTED

## 2020-10-28 ENCOUNTER — HOSPITAL ENCOUNTER (OUTPATIENT)
Facility: HOSPITAL | Age: 65
Setting detail: HOSPITAL OUTPATIENT SURGERY
Discharge: HOME | End: 2020-10-28
Attending: INTERNAL MEDICINE | Admitting: INTERNAL MEDICINE

## 2020-10-28 ENCOUNTER — ANESTHESIA (OUTPATIENT)
Dept: GASTROENTEROLOGY | Facility: HOSPITAL | Age: 65
End: 2020-10-28

## 2020-10-28 ENCOUNTER — ON CAMPUS - OUTPATIENT (OUTPATIENT)
Dept: URBAN - METROPOLITAN AREA HOSPITAL 85 | Facility: HOSPITAL | Age: 65
End: 2020-10-28

## 2020-10-28 VITALS
HEART RATE: 70 BPM | DIASTOLIC BLOOD PRESSURE: 66 MMHG | TEMPERATURE: 98.2 F | RESPIRATION RATE: 14 BRPM | BODY MASS INDEX: 17.84 KG/M2 | WEIGHT: 104.5 LBS | OXYGEN SATURATION: 97 % | SYSTOLIC BLOOD PRESSURE: 116 MMHG | HEIGHT: 64 IN

## 2020-10-28 DIAGNOSIS — D12.2 BENIGN NEOPLASM OF ASCENDING COLON: ICD-10-CM

## 2020-10-28 DIAGNOSIS — K21.9 GERD (GASTROESOPHAGEAL REFLUX DISEASE): ICD-10-CM

## 2020-10-28 DIAGNOSIS — Z86.010 HISTORY OF COLON POLYPS: ICD-10-CM

## 2020-10-28 DIAGNOSIS — Z86.010 PERSONAL HISTORY OF COLONIC POLYPS: ICD-10-CM

## 2020-10-28 DIAGNOSIS — K57.30 DIVERTICULOSIS OF LARGE INTESTINE WITHOUT PERFORATION OR ABS: ICD-10-CM

## 2020-10-28 PROCEDURE — 88305 TISSUE EXAM BY PATHOLOGIST: CPT | Performed by: INTERNAL MEDICINE

## 2020-10-28 PROCEDURE — 45385 COLONOSCOPY W/LESION REMOVAL: CPT | Mod: PT | Performed by: INTERNAL MEDICINE

## 2020-10-28 PROCEDURE — 25010000002 PROPOFOL 200 MG/20ML EMULSION: Performed by: ANESTHESIOLOGY

## 2020-10-28 RX ORDER — ONDANSETRON 2 MG/ML
4 INJECTION INTRAMUSCULAR; INTRAVENOUS ONCE AS NEEDED
Status: DISCONTINUED | OUTPATIENT
Start: 2020-10-28 | End: 2020-10-28 | Stop reason: HOSPADM

## 2020-10-28 RX ORDER — SODIUM CHLORIDE 9 MG/ML
30 INJECTION, SOLUTION INTRAVENOUS CONTINUOUS PRN
Status: DISCONTINUED | OUTPATIENT
Start: 2020-10-28 | End: 2020-10-28 | Stop reason: HOSPADM

## 2020-10-28 RX ORDER — SODIUM CHLORIDE 0.9 % (FLUSH) 0.9 %
10 SYRINGE (ML) INJECTION AS NEEDED
Status: DISCONTINUED | OUTPATIENT
Start: 2020-10-28 | End: 2020-10-28 | Stop reason: HOSPADM

## 2020-10-28 RX ORDER — SODIUM CHLORIDE 0.9 % (FLUSH) 0.9 %
3 SYRINGE (ML) INJECTION EVERY 12 HOURS SCHEDULED
Status: DISCONTINUED | OUTPATIENT
Start: 2020-10-28 | End: 2020-10-28 | Stop reason: HOSPADM

## 2020-10-28 RX ORDER — SODIUM CHLORIDE 0.9 % (FLUSH) 0.9 %
3-10 SYRINGE (ML) INJECTION AS NEEDED
Status: DISCONTINUED | OUTPATIENT
Start: 2020-10-28 | End: 2020-10-28 | Stop reason: HOSPADM

## 2020-10-28 RX ORDER — LIDOCAINE HYDROCHLORIDE 10 MG/ML
0.5 INJECTION, SOLUTION EPIDURAL; INFILTRATION; INTRACAUDAL; PERINEURAL ONCE AS NEEDED
Status: DISCONTINUED | OUTPATIENT
Start: 2020-10-28 | End: 2020-10-28 | Stop reason: HOSPADM

## 2020-10-28 RX ORDER — LIDOCAINE HYDROCHLORIDE 20 MG/ML
INJECTION, SOLUTION EPIDURAL; INFILTRATION; INTRACAUDAL; PERINEURAL AS NEEDED
Status: DISCONTINUED | OUTPATIENT
Start: 2020-10-28 | End: 2020-10-28 | Stop reason: SURG

## 2020-10-28 RX ORDER — PROPOFOL 10 MG/ML
INJECTION, EMULSION INTRAVENOUS AS NEEDED
Status: DISCONTINUED | OUTPATIENT
Start: 2020-10-28 | End: 2020-10-28 | Stop reason: SURG

## 2020-10-28 RX ORDER — SODIUM CHLORIDE 9 MG/ML
9 INJECTION, SOLUTION INTRAVENOUS CONTINUOUS PRN
Status: DISCONTINUED | OUTPATIENT
Start: 2020-10-28 | End: 2020-10-28 | Stop reason: HOSPADM

## 2020-10-28 RX ADMIN — LIDOCAINE HYDROCHLORIDE 50 MG: 20 INJECTION, SOLUTION EPIDURAL; INFILTRATION; INTRACAUDAL; PERINEURAL at 11:13

## 2020-10-28 RX ADMIN — PROPOFOL 500 MG: 10 INJECTION, EMULSION INTRAVENOUS at 11:49

## 2020-10-28 RX ADMIN — SODIUM CHLORIDE: 0.9 INJECTION, SOLUTION INTRAVENOUS at 11:12

## 2020-10-28 RX ADMIN — SODIUM CHLORIDE 9 ML/HR: 9 INJECTION, SOLUTION INTRAVENOUS at 10:33

## 2020-10-28 NOTE — ANESTHESIA POSTPROCEDURE EVALUATION
Patient: Shauna Marks    Procedure Summary     Date: 10/28/20 Room / Location: [facility] ENDOSCOPY 4 / [facility] ENDOSCOPY    Anesthesia Start: 1112 Anesthesia Stop: 1151    Procedure: Colonoscopy with polypectomy x 10 (N/A ) Diagnosis:       History of colon polyps      GERD (gastroesophageal reflux disease)      (History of colon polyps [Z86.010])      (GERD (gastroesophageal reflux disease) [K21.9])    Surgeon: Sudarshan Mitchell MD Provider: Gin Alvarez MD    Anesthesia Type: MAC ASA Status: 3          Anesthesia Type: MAC    Vitals  Vitals Value Taken Time   /66 10/28/20 1210   Temp     Pulse 64 10/28/20 1219   Resp 14 10/28/20 1210   SpO2 95 % 10/28/20 1219   Vitals shown include unvalidated device data.        Post Anesthesia Care and Evaluation    Patient location during evaluation: PACU  Patient participation: complete - patient participated  Level of consciousness: awake  Pain scale: See nurse's notes for pain score.  Pain management: adequate  Airway patency: patent  Anesthetic complications: No anesthetic complications  PONV Status: none  Cardiovascular status: acceptable  Respiratory status: acceptable  Hydration status: acceptable    Comments: Patient seen and examined postoperatively; vital signs stable; SpO2 greater than or equal to 90%; cardiopulmonary status stable; nausea/vomiting adequately controlled; pain adequately controlled; no apparent anesthesia complications; patient discharged from anesthesia care when discharge criteria were met

## 2020-10-28 NOTE — ANESTHESIA PREPROCEDURE EVALUATION
Anesthesia Evaluation     Nursing notes reviewed   NPO Solid Status: > 8 hours  NPO Liquid Status: > 8 hours           Airway   Mallampati: II  Dental - normal exam     Pulmonary    Cardiovascular     (+) hypertension,       Neuro/Psych  (+) psychiatric history Anxiety,     GI/Hepatic/Renal/Endo    (+)  GERD,      Musculoskeletal     Abdominal    Substance History      OB/GYN          Other        ROS/Med Hx Other: Multiple allergies greater than 4.  Propofol causes headache                  Anesthesia Plan    ASA 3     MAC       Anesthetic plan, all risks, benefits, and alternatives have been provided, discussed and informed consent has been obtained with: patient.

## 2020-10-29 LAB
LAB AP CASE REPORT: NORMAL
PATH REPORT.FINAL DX SPEC: NORMAL
PATH REPORT.GROSS SPEC: NORMAL

## 2020-10-30 RX ORDER — ACYCLOVIR 400 MG/1
400 TABLET ORAL DAILY
Qty: 90 TABLET | Refills: 1 | Status: SHIPPED | OUTPATIENT
Start: 2020-10-30 | End: 2020-10-30 | Stop reason: SDUPTHER

## 2020-10-30 RX ORDER — ACYCLOVIR 400 MG/1
400 TABLET ORAL DAILY
Qty: 90 TABLET | Refills: 1 | Status: SHIPPED | OUTPATIENT
Start: 2020-10-30 | End: 2021-07-27 | Stop reason: SDUPTHER

## 2020-11-04 ENCOUNTER — TELEMEDICINE (OUTPATIENT)
Dept: FAMILY MEDICINE CLINIC | Facility: CLINIC | Age: 65
End: 2020-11-04

## 2020-11-04 DIAGNOSIS — Z20.822 EXPOSURE TO COVID-19 VIRUS: Primary | ICD-10-CM

## 2020-11-04 DIAGNOSIS — Z71.89 EDUCATED ABOUT COVID-19 VIRUS INFECTION: ICD-10-CM

## 2020-11-04 PROCEDURE — U0003 INFECTIOUS AGENT DETECTION BY NUCLEIC ACID (DNA OR RNA); SEVERE ACUTE RESPIRATORY SYNDROME CORONAVIRUS 2 (SARS-COV-2) (CORONAVIRUS DISEASE [COVID-19]), AMPLIFIED PROBE TECHNIQUE, MAKING USE OF HIGH THROUGHPUT TECHNOLOGIES AS DESCRIBED BY CMS-2020-01-R: HCPCS | Performed by: FAMILY MEDICINE

## 2020-11-04 PROCEDURE — 99442 PR PHYS/QHP TELEPHONE EVALUATION 11-20 MIN: CPT | Performed by: FAMILY MEDICINE

## 2020-11-04 RX ORDER — LEVOFLOXACIN 750 MG/1
750 TABLET ORAL DAILY
Qty: 10 TABLET | Refills: 0 | Status: SHIPPED | OUTPATIENT
Start: 2020-11-04 | End: 2020-11-04

## 2020-11-04 RX ORDER — METHYLPREDNISOLONE 4 MG/1
TABLET ORAL
Qty: 1 EACH | Refills: 0 | Status: SHIPPED | OUTPATIENT
Start: 2020-11-04 | End: 2020-11-04

## 2020-11-04 NOTE — PROGRESS NOTES
Subjective   Chief Complaint   Patient presents with   • Exposure To Known Illness     Shauna Marks is a 65 y.o. female.     Patient Care Team:  Talia Grey MD as PCP - General (Family Medicine)  Asa Greenwood MD as Consulting Physician (Vascular Surgery)     You have chosen to receive care through a telephone visit. Do you consent to use a telephone visit for your medical care today? Yes      Patient is being evaluated today through telehealth medicine appointment via the phone in view of COVID-19 pandemic.  The video is not available to the patient.  She wants to talk about her current exposure to COVID-19, which happened on 10/24/2020.  She had COVID-19 test done on 10/26/2020.  This test was done as a screening prior to her colonoscopy earlier this week.  She denies any symptoms.  She denies any cough, congestion, difficulty breathing, fever, body aches, any GI symptoms, she denies any change in the perception of taste or smell.  She is however concerned about the exposure and the fact that the original screening test for Covid was done very shortly after the exposure and she would like to be retested.  She is currently staying at home and does not really go anywhere.       The following portions of the patient's history were reviewed and updated as appropriate: allergies, current medications, past family history, past medical history, past social history, past surgical history and problem list.  Past Medical History:   Diagnosis Date   • Anxiety    • GERD (gastroesophageal reflux disease)    • History of transfusion    • Hypertension    • IBS (irritable bowel syndrome)    • Osteoporosis      Past Surgical History:   Procedure Laterality Date   • BREAST SURGERY      biopsies   • CARDIAC CATHETERIZATION     • COLONOSCOPY  2014   • COLONOSCOPY W/ POLYPECTOMY N/A 10/28/2020    Procedure: Colonoscopy with polypectomy x 10;  Surgeon: Sudarshan Mitchell MD;  Location: [facility] ENDOSCOPY;  Service:  Gastroenterology;  Laterality: N/A;  colon polyps x 10,  hemorrhoids   • HERNIA REPAIR     • LASER ABLATION CONDYLOMA CERVICAL / VULVAR     • SKIN BIOPSY     • SPLENECTOMY  2014     The patient has a family history of  Family History   Problem Relation Age of Onset   • Lung cancer Mother    • Coronary artery disease Father      Social History     Socioeconomic History   • Marital status: Significant Other     Spouse name: Not on file   • Number of children: Not on file   • Years of education: Not on file   • Highest education level: Not on file   Tobacco Use   • Smoking status: Current Every Day Smoker     Packs/day: 0.50     Types: Cigarettes   • Smokeless tobacco: Never Used   Substance and Sexual Activity   • Alcohol use: Yes     Comment: social   • Drug use: No   • Sexual activity: Defer       Review of Systems   Constitutional: Negative for activity change, appetite change, chills and fever.   HENT: Negative for congestion, ear pain, postnasal drip, sinus pressure, sore throat and swollen glands.    Respiratory: Negative for cough, choking, chest tightness, shortness of breath, wheezing and stridor.    Cardiovascular: Negative for chest pain.   Skin: Negative for dry skin and rash.     There were no vitals taken for this visit.    Current Outpatient Medications:   •  acyclovir (ZOVIRAX) 400 MG tablet, Take 1 tablet by mouth Daily., Disp: 90 tablet, Rfl: 1  •  betamethasone valerate (VALISONE) 0.1 % ointment, As Needed., Disp: , Rfl:   •  clobetasol (TEMOVATE) 0.05 % ointment, As Needed., Disp: , Rfl:   •  coenzyme Q10 100 MG capsule, Take 100 mg by mouth Daily. hold xdays, Disp: , Rfl:   •  cycloSPORINE (Restasis) 0.05 % ophthalmic emulsion, Restasis 0.05 % eye drops in a dropperette, Disp: , Rfl:   •  dicyclomine (BENTYL) 20 MG tablet, Take 20 mg by mouth Every 6 (Six) Hours As Needed., Disp: , Rfl:   •  Digestive Enzymes capsule, Daily., Disp: , Rfl:   •  enalapril (VASOTEC) 5 MG tablet, 1 pill p.o. in the  morning and half a pill p.o. in the evening (Patient taking differently: Take 5 mg by mouth. 1 pill p.o. in the morning and half a pill p.o. in the evening), Disp: 135 tablet, Rfl: 1  •  hydrocortisone 2.5 % cream, APPLY A SMALL AMOUNT AFFECTED AREA TWICE A DAY, Disp: , Rfl:   •  Lactobacillus Acid-Pectin (ACIDOPHILUS/PECTIN) capsule, Take 1 capsule by mouth Daily., Disp: , Rfl:   •  LORazepam (ATIVAN) 1 MG tablet, Take 1 tablet by mouth At Night As Needed for Anxiety., Disp: 30 tablet, Rfl: 0  •  Loteprednol Etabonate (Lotemax) 0.5 % ointment, Lotemax 0.5 % eye ointment, Disp: , Rfl:   •  Magnesium 200 MG tablet, Daily., Disp: , Rfl:   •  Multiple Vitamins-Minerals (multivitamin with minerals) tablet tablet, Take 1 tablet by mouth Daily. Hold x5 days, Disp: , Rfl:   •  polyethylene glycol (MIRALAX) powder powder, Take 17 g by mouth As Needed., Disp: , Rfl:   •  Probiotic Product (PROBIOTIC-10 PO), , Disp: , Rfl:   •  rosuvastatin (CRESTOR) 5 MG tablet, Take 1 tablet by mouth every night at bedtime., Disp: 90 tablet, Rfl: 1  •  vitamin C (ASCORBIC ACID) 500 MG tablet, Take 1,000 mg by mouth Daily., Disp: , Rfl:     Objective   Physical Exam  Constitutional:       Comments: She is pleasant and cooperative, normal voice and speech, no distress noted while patient was evaluated on the phone today.   Pulmonary:      Effort: No respiratory distress.   Neurological:      Mental Status: She is alert and oriented to person, place, and time.   Psychiatric:         Mood and Affect: Mood normal.         Behavior: Behavior normal.         Thought Content: Thought content normal.         Judgment: Judgment normal.              No visits with results within 7 Day(s) from this visit.   Latest known visit with results is:   Admission on 10/28/2020, Discharged on 10/28/2020   Component Date Value Ref Range Status   • Case Report 10/28/2020    Final                    Value:Surgical Pathology Report                         Case:  [id number]                                  Authorizing Provider:  Sudarshan Mitchell MD   Collected:           10/28/2020 11:27 AM          Ordering Location:     [facility]  Received:            10/28/2020 01:00 PM                                 SUITES                                                                       Pathologist:           Garfield Gilbert MD                                                           Specimens:   1) - Large Intestine, Right / Ascending Colon, ascending colon polyp x 6                            2) - Large Intestine, Transverse Colon, 1 hot, 1 cold transverse colon polyp                        3) - Large Intestine, Left / Descending Colon                                             • Final Diagnosis 10/28/2020    Final                    Value:This result contains rich text formatting which cannot be displayed here.   • Gross Description 10/28/2020    Final                    Value:This result contains rich text formatting which cannot be displayed here.         Lab Results   Component Value Date    BUN 10 09/10/2020    CREATININE 0.77 09/10/2020    EGFRIFNONA 75 09/10/2020     09/10/2020    K 4.2 09/10/2020    CL 99 09/10/2020    CALCIUM 9.5 09/10/2020    ALBUMIN 4.80 09/10/2020    BILITOT 0.4 09/10/2020    ALKPHOS 75 09/10/2020    AST 25 09/10/2020    ALT 15 09/10/2020    TRIG 55 09/10/2020    HDL 59 09/10/2020    VLDL 11 09/10/2020    LDL 68 09/10/2020    LDLHDL 1.15 09/10/2020    WBC 12.74 (H) 09/10/2020    RBC 4.32 09/10/2020    HCT 41.5 09/10/2020    MCV 96.1 09/10/2020    MCH 32.6 09/10/2020    TSH 1.080 09/10/2020    TNWL46LZ 48.9 09/10/2020          Assessment/Plan   Problems Addressed this Visit        Other    Exposure to COVID-19 virus - Primary    Relevant Orders    COVID-19,[facility] ROUTINE, NP/OP SWAB IN TRANSPORT MEDIA OR ESWAB 72 HR TAT - Swab, Nasopharynx    QUESTIONNAIRE SERIES (Completed)    Educated about COVID-19 virus infection       Diagnoses       Codes Comments    Exposure to COVID-19 virus    -  Primary ICD-10-CM: Z20.828  ICD-9-CM: V01.79     Educated about COVID-19 virus infection     ICD-10-CM: Z71.89  ICD-9-CM: V65.49         Considering her recent exposure I will be testing her for COVID-19 virus.  She was advised to monitor for the symptoms development.  She was also advised to continue to quarantine.  All questions were answered today to my best knowledge and her satisfaction.    Patient was evaluated today through telehealth appointment via the phone.  Total evaluation time was 15 minutes.             Requested Prescriptions      No prescriptions requested or ordered in this encounter

## 2020-11-08 DIAGNOSIS — F41.9 ANXIETY: ICD-10-CM

## 2020-11-08 RX ORDER — LORAZEPAM 1 MG/1
TABLET ORAL
Qty: 30 TABLET | Refills: 0 | Status: SHIPPED | OUTPATIENT
Start: 2020-11-08 | End: 2020-12-16

## 2020-11-17 DIAGNOSIS — Z12.31 VISIT FOR SCREENING MAMMOGRAM: ICD-10-CM

## 2020-12-16 DIAGNOSIS — F41.9 ANXIETY: ICD-10-CM

## 2020-12-16 RX ORDER — LORAZEPAM 1 MG/1
TABLET ORAL
Qty: 30 TABLET | Refills: 0 | Status: SHIPPED | OUTPATIENT
Start: 2020-12-16 | End: 2021-01-16

## 2021-01-15 ENCOUNTER — PATIENT MESSAGE (OUTPATIENT)
Dept: FAMILY MEDICINE CLINIC | Facility: CLINIC | Age: 66
End: 2021-01-15

## 2021-01-15 ENCOUNTER — TELEPHONE (OUTPATIENT)
Dept: FAMILY MEDICINE CLINIC | Facility: CLINIC | Age: 66
End: 2021-01-15

## 2021-01-15 RX ORDER — DOXYCYCLINE HYCLATE 100 MG
100 TABLET ORAL 2 TIMES DAILY
Qty: 14 TABLET | Refills: 0 | Status: SHIPPED | OUTPATIENT
Start: 2021-01-15 | End: 2021-01-22

## 2021-01-15 NOTE — TELEPHONE ENCOUNTER
Please see my reply on the phone note from earlier today.  [facility] is supposed to call the patient.  Thank you.

## 2021-01-15 NOTE — TELEPHONE ENCOUNTER
Caller: Shauna Marks    Relationship to patient: Self    Best call back number: [phone]    Additional notes: PATIENTS STATES SHE THINKS SPIDER BITE IS GETTING BETTER. PATIENT STATES SHE DOES NOT THINK  WILL BE ABLE TO CLEARLY SEE IT ON HER COMPUTER CAMERA SO PATIENT IS ATTEMPTING TO UPLOAD PICTURE TO [employer] MESSAGE.    PATIENT STATES SHE DOES NOT THINK SHE NEEDS TO BE SEEN AND DID NOT WANT TO SCHEDULE A MYCHART APPOINTMENT BUT IS REQUESTING ADVISE ON COURSE OF TREATMENT AFTER PICTURE IS VIEWED.    PATIENT IS REQUESTING EITHER A CALL BACK OR COMMUNICATION VIA [employer].       If a prescription is needed, what is your preferred pharmacy:   [facility] - [place], [place] - [address] - [phone]  - [phone] FX  [address] [place] [address] [place] [address]  Phone: [phone] Fax: [phone]

## 2021-01-15 NOTE — TELEPHONE ENCOUNTER
She already sent me several different messages through [employer] about that and I suggested for her to make an appointment.  First she agrees now she tells me that it is better and she just wants me to communicate with her and give her a medical advice through my chart.  I do not consider this to be a good patient care.  Now she thinks she does not need to be seen.  Please call the patient and explained that I cannot manage patient and give medical advice only through the my chart back and forth an appointment is needed and required for issues to be correctly addressed.  Please ask her if she has any update on the concern about a spider bite.  Thank you.

## 2021-01-16 DIAGNOSIS — F41.9 ANXIETY: ICD-10-CM

## 2021-01-16 RX ORDER — LORAZEPAM 1 MG/1
TABLET ORAL
Qty: 30 TABLET | Refills: 0 | Status: SHIPPED | OUTPATIENT
Start: 2021-01-16 | End: 2021-02-18

## 2021-02-01 ENCOUNTER — TELEPHONE (OUTPATIENT)
Dept: FAMILY MEDICINE CLINIC | Facility: CLINIC | Age: 66
End: 2021-02-01

## 2021-02-01 NOTE — TELEPHONE ENCOUNTER
She possibly can take Benadryl, she however needs to notify them that she has had some allergy before and they might want to observe her for a little bit longer than just 15 minutes.  Thank you.

## 2021-02-01 NOTE — TELEPHONE ENCOUNTER
Tried calling 3 times. Phone rings, sounds like someone picks up and then hits mute. Unable to hear anyone speak

## 2021-02-01 NOTE — TELEPHONE ENCOUNTER
Caller: Shauna Marks    Relationship to patient: Self    Best call back number: [phone]    Patient is needing: PATIENT IS SCHEDULED TO GET COVID VACCINE TODAY AT 1:00PM AND WANTED TO KNOW IF SHE SHOULD TAKE A BENADRYL BEFORE GETTING HER VACCINE.

## 2021-02-18 DIAGNOSIS — F41.9 ANXIETY: ICD-10-CM

## 2021-02-18 RX ORDER — LORAZEPAM 1 MG/1
TABLET ORAL
Qty: 30 TABLET | Refills: 0 | Status: SHIPPED | OUTPATIENT
Start: 2021-02-18 | End: 2021-03-27

## 2021-03-04 RX ORDER — ENALAPRIL MALEATE 5 MG/1
TABLET ORAL
Qty: 135 TABLET | Refills: 1 | Status: SHIPPED | OUTPATIENT
Start: 2021-03-04 | End: 2021-03-04 | Stop reason: SDUPTHER

## 2021-03-04 RX ORDER — ENALAPRIL MALEATE 5 MG/1
TABLET ORAL
Qty: 135 TABLET | Refills: 1 | Status: SHIPPED | OUTPATIENT
Start: 2021-03-04 | End: 2021-03-05 | Stop reason: SDUPTHER

## 2021-03-05 RX ORDER — ENALAPRIL MALEATE 5 MG/1
TABLET ORAL
Qty: 135 TABLET | Refills: 1 | Status: SHIPPED | OUTPATIENT
Start: 2021-03-05 | End: 2021-03-16

## 2021-03-16 ENCOUNTER — OFFICE VISIT (OUTPATIENT)
Dept: FAMILY MEDICINE CLINIC | Facility: CLINIC | Age: 66
End: 2021-03-16

## 2021-03-16 VITALS
DIASTOLIC BLOOD PRESSURE: 77 MMHG | BODY MASS INDEX: 18.95 KG/M2 | WEIGHT: 111 LBS | RESPIRATION RATE: 16 BRPM | OXYGEN SATURATION: 98 % | HEIGHT: 64 IN | SYSTOLIC BLOOD PRESSURE: 146 MMHG | TEMPERATURE: 97.1 F | HEART RATE: 85 BPM

## 2021-03-16 DIAGNOSIS — I77.9 BILATERAL CAROTID ARTERY DISEASE, UNSPECIFIED TYPE (HCC): ICD-10-CM

## 2021-03-16 DIAGNOSIS — I10 ESSENTIAL HYPERTENSION: Primary | ICD-10-CM

## 2021-03-16 DIAGNOSIS — Z72.0 TOBACCO ABUSE: ICD-10-CM

## 2021-03-16 DIAGNOSIS — I79.8 OTHER DISORDERS OF ARTERIES, ARTERIOLES AND CAPILLARIES IN DISEASES CLASSIFIED ELSEWHERE (HCC): ICD-10-CM

## 2021-03-16 DIAGNOSIS — I71.40 ABDOMINAL AORTIC ANEURYSM (AAA) WITHOUT RUPTURE (HCC): ICD-10-CM

## 2021-03-16 DIAGNOSIS — E78.5 DYSLIPIDEMIA: ICD-10-CM

## 2021-03-16 PROBLEM — Z71.89 EDUCATED ABOUT COVID-19 VIRUS INFECTION: Status: RESOLVED | Noted: 2020-11-04 | Resolved: 2021-03-16

## 2021-03-16 PROBLEM — Z20.822 EXPOSURE TO COVID-19 VIRUS: Status: RESOLVED | Noted: 2020-11-04 | Resolved: 2021-03-16

## 2021-03-16 PROCEDURE — 99214 OFFICE O/P EST MOD 30 MIN: CPT | Performed by: FAMILY MEDICINE

## 2021-03-16 RX ORDER — ENALAPRIL MALEATE 5 MG/1
5 TABLET ORAL 2 TIMES DAILY
Qty: 135 TABLET | Refills: 1
Start: 2021-03-16 | End: 2021-09-13

## 2021-03-16 NOTE — PROGRESS NOTES
Subjective   Chief Complaint   Patient presents with   • Follow-up   • Med Refill   • Hypertension   • Hyperlipidemia   • Anxiety   • GI Problem     Shauna Marks is a 65 y.o. female.     Patient Care Team:  Talia Grey MD as PCP - General (Family Medicine)  Asa Greenwood MD as Consulting Physician (Vascular Surgery)    She is coming in today to follow-up on her chronic medical problems and her medications.  We are addressing her hypertension, hyperlipidemia, GI issues, anxiety, arthritis and chronic pain, and vascular issues.  She was seen and evaluated by vascular specialist last year due to slight enlargement of the abdominal aorta in some plaque buildup in the carotid arteries.  Follow-up ultrasounds were recommended.  She is currently on blood pressure medicine and also takes a small dose of Crestor as she was not able to tolerate higher doses in the past.  She is trying to improve her lifestyle overall including diet and physical activity.  She moved to this area from [place] about a year and a half ago.  She is still smoking up to half a pack a day. Patient denies any chest pain, shortness of breath, dizziness, nausea, vomiting, or diarrhea. No visual issues reported. No headaches, numbness or tingling. No urinary issues reported like urgency, frequency, or discomfort upon urination. Patient has good appetite and denies any weight changes. No swelling reported.  No rashes or any other skin issues reported.  Patient denies polyuria or polydipsia as well as heat or cold intolerance.       The following portions of the patient's history were reviewed and updated as appropriate: allergies, current medications, past family history, past medical history, past social history, past surgical history and problem list.  Past Medical History:   Diagnosis Date   • Anxiety    • GERD (gastroesophageal reflux disease)    • History of transfusion    • Hypertension    • IBS (irritable bowel syndrome)    •  "Osteoporosis      Past Surgical History:   Procedure Laterality Date   • BREAST SURGERY      biopsies   • CARDIAC CATHETERIZATION     • COLONOSCOPY  2014   • COLONOSCOPY W/ POLYPECTOMY N/A 10/28/2020    Procedure: Colonoscopy with polypectomy x 10;  Surgeon: Sudarshan Mitchell MD;  Location: [facility] ENDOSCOPY;  Service: Gastroenterology;  Laterality: N/A;  colon polyps x 10,  hemorrhoids   • HERNIA REPAIR     • LASER ABLATION CONDYLOMA CERVICAL / VULVAR     • SKIN BIOPSY     • SPLENECTOMY  2014     The patient has a family history of  Family History   Problem Relation Age of Onset   • Lung cancer Mother    • Coronary artery disease Father      Social History     Socioeconomic History   • Marital status: Significant Other     Spouse name: Not on file   • Number of children: Not on file   • Years of education: Not on file   • Highest education level: Not on file   Tobacco Use   • Smoking status: Current Every Day Smoker     Packs/day: 0.50     Types: Cigarettes   • Smokeless tobacco: Never Used   Substance and Sexual Activity   • Alcohol use: Yes     Comment: social   • Drug use: No   • Sexual activity: Defer       Review of Systems   Constitutional: Negative for activity change, fatigue and fever.   Respiratory: Negative for cough, shortness of breath and wheezing.    Cardiovascular: Negative for chest pain, palpitations and leg swelling.   Gastrointestinal: Negative for constipation, diarrhea and indigestion.   Skin: Negative for color change, dry skin and rash.   Neurological: Negative for tremors and headache.     Visit Vitals  /77 (BP Location: Left arm, Patient Position: Sitting, Cuff Size: Adult)   Pulse 85   Temp 97.1 °F (36.2 °C) (Temporal)   Resp 16   Ht 162.6 cm (64\")   Wt 50.3 kg (111 lb)   SpO2 98%   BMI 19.05 kg/m²       Current Outpatient Medications:   •  acyclovir (ZOVIRAX) 400 MG tablet, Take 1 tablet by mouth Daily., Disp: 90 tablet, Rfl: 1  •  betamethasone valerate (VALISONE) 0.1 % " ointment, As Needed., Disp: , Rfl:   •  clobetasol (TEMOVATE) 0.05 % ointment, As Needed., Disp: , Rfl:   •  cycloSPORINE (Restasis) 0.05 % ophthalmic emulsion, Restasis 0.05 % eye drops in a dropperette, Disp: , Rfl:   •  dicyclomine (BENTYL) 20 MG tablet, Take 20 mg by mouth Every 6 (Six) Hours As Needed., Disp: , Rfl:   •  Digestive Enzymes capsule, Daily., Disp: , Rfl:   •  enalapril (VASOTEC) 5 MG tablet, Take 1 tablet by mouth 2 (Two) Times a Day. 1 pill p.o. in the morning and half a pill p.o. in the evening, Disp: 135 tablet, Rfl: 1  •  hydrocortisone 2.5 % cream, APPLY A SMALL AMOUNT AFFECTED AREA TWICE A DAY, Disp: , Rfl:   •  Lactobacillus Acid-Pectin (ACIDOPHILUS/PECTIN) capsule, Take 1 capsule by mouth Daily., Disp: , Rfl:   •  LORazepam (ATIVAN) 1 MG tablet, TAKE ONE TABLET BY MOUTH EVERY NIGHT AT BEDTIME AS NEEDED FOR ANXIETY, Disp: 30 tablet, Rfl: 0  •  Loteprednol Etabonate (Lotemax) 0.5 % ointment, Lotemax 0.5 % eye ointment, Disp: , Rfl:   •  Magnesium 200 MG tablet, Daily., Disp: , Rfl:   •  Multiple Vitamins-Minerals (multivitamin with minerals) tablet tablet, Take 1 tablet by mouth Daily. Hold x5 days, Disp: , Rfl:   •  polyethylene glycol (MIRALAX) powder powder, Take 17 g by mouth As Needed., Disp: , Rfl:   •  Probiotic Product (PROBIOTIC-10 PO), , Disp: , Rfl:   •  rosuvastatin (CRESTOR) 5 MG tablet, Take 1 tablet by mouth every night at bedtime., Disp: 90 tablet, Rfl: 1  •  vitamin C (ASCORBIC ACID) 500 MG tablet, Take 1,000 mg by mouth Daily., Disp: , Rfl:     Objective   Physical Exam  Vitals and nursing note reviewed.   Constitutional:       General: She is not in acute distress.     Appearance: Normal appearance. She is well-developed. She is not ill-appearing.   HENT:      Head: Normocephalic and atraumatic.   Cardiovascular:      Rate and Rhythm: Normal rate and regular rhythm.      Heart sounds: Normal heart sounds. No murmur. No gallop.    Pulmonary:      Effort: Pulmonary effort is  normal. No respiratory distress.      Breath sounds: Normal breath sounds. No wheezing, rhonchi or rales.   Chest:      Chest wall: No tenderness.   Musculoskeletal:      Cervical back: Normal range of motion and neck supple.   Neurological:      General: No focal deficit present.      Mental Status: She is alert and oriented to person, place, and time. Mental status is at baseline.   Psychiatric:         Mood and Affect: Mood normal.             No visits with results within 7 Day(s) from this visit.   Latest known visit with results is:   Admission on 11/04/2020, Discharged on 11/04/2020   Component Date Value Ref Range Status   • SARS-CoV-2, PHYLLIS 11/04/2020 Not Detected  Not Detected Final    Comment: This nucleic acid amplification test was developed and its performance  characteristics determined by [employer]. Nucleic acid  amplification tests include PCR and TMA. This test has not been FDA  cleared or approved. This test has been authorized by FDA under an  Emergency Use Authorization (EUA). This test is only authorized for  the duration of time the declaration that circumstances exist  justifying the authorization of the emergency use of in vitro  diagnostic tests for detection of SARS-CoV-2 virus and/or diagnosis  of COVID-19 infection under section 564(b)(1) of the Act, 21 U.S.C.  360bbb-3(b) (1), unless the authorization is terminated or revoked  sooner.  When diagnostic testing is negative, the possibility of a false  negative result should be considered in the context of a patient's  recent exposures and the presence of clinical signs and symptoms  consistent with COVID-19. An individual without symptoms of COVID-19  and who is not shedding SARS-CoV-2 virus would                            expect to have a  negative (not detected) result in this assay.       FOLLOWING LABS WERE REVIEWED TODAY:  CMP    CMP 5/21/20 9/10/20   Glucose  102 (A)   Glucose 106 (A)    BUN 11 10   Creatinine 0.7 0.77    eGFR Non African Am  75   Sodium 137 138   Potassium 4.9 4.2   Chloride 98 99   Calcium 10.1 9.5   Albumin 4.7 4.80   Total Bilirubin 0.4 0.4   Alkaline Phosphatase 73 75   AST (SGOT) 33 25   ALT (SGPT) 15 15   (A) Abnormal value            CBC    CBC 5/21/20 9/10/20   WBC 11.46 (A) 12.74 (A)   RBC 4.62 4.32   Hemoglobin 15.2 14.1   Hematocrit 46.4 (A) 41.5   .4 (A) 96.1   MCH 32.9 32.6   MCHC 32.8 34.0   RDW 14.8 12.2 (A)   Platelets 320 297   (A) Abnormal value            Lipid Panel    Lipid Panel 5/21/20 9/10/20   Total Cholesterol  138   Total Cholesterol 190    Triglycerides 58 55   HDL Cholesterol 68 59   VLDL Cholesterol 12 11   LDL Cholesterol  110 (A) 68   LDL/HDL Ratio  1.15   (A) Abnormal value            TSH    TSH 9/10/20   TSH 1.080               Assessment/Plan   Diagnoses and all orders for this visit:    1. Essential hypertension (Primary)    2. Dyslipidemia  -     Comprehensive Metabolic Panel  -     Lipid Panel    3. Abdominal aortic aneurysm (AAA) without rupture (CMS/HCC)  -     US Abdomen Complete; Future    4. Bilateral carotid artery disease, unspecified type (CMS/HCC)  -     Duplex Carotid Ultrasound CAR; Future    5. Other disorders of arteries, arterioles and capillaries in diseases classified elsewhere (CMS/HCC)   -     Duplex Carotid Ultrasound CAR; Future    6. Tobacco abuse    Other orders  -     enalapril (VASOTEC) 5 MG tablet; Take 1 tablet by mouth 2 (Two) Times a Day. 1 pill p.o. in the morning and half a pill p.o. in the evening  Dispense: 135 tablet; Refill: 1      I reviewed her medical problems and her medications.  Her blood pressure is not ideally controlled and I will be increasing her enalapril and she will start taking 5 mg twice a day.  I will be getting fasting labs.  I will be scheduling abdominal ultrasound and carotid ultrasound in June of this year.  Healthy lifestyle was discussed and reinforced.  Smoking cessation was also discussed and recommended.  She  already completed her COVID-19 vaccination series.            Return in about 6 months (around 9/16/2021) for Medicare Wellness.    Requested Prescriptions     Signed Prescriptions Disp Refills   • enalapril (VASOTEC) 5 MG tablet 135 tablet 1     Sig: Take 1 tablet by mouth 2 (Two) Times a Day. 1 pill p.o. in the morning and half a pill p.o. in the evening

## 2021-03-18 ENCOUNTER — LAB (OUTPATIENT)
Dept: FAMILY MEDICINE CLINIC | Facility: CLINIC | Age: 66
End: 2021-03-18

## 2021-03-18 LAB
ALBUMIN SERPL-MCNC: 4.3 G/DL (ref 3.5–5.2)
ALBUMIN/GLOB SERPL: 1.4 G/DL
ALP SERPL-CCNC: 71 U/L (ref 39–117)
ALT SERPL W P-5'-P-CCNC: 19 U/L (ref 1–33)
ANION GAP SERPL CALCULATED.3IONS-SCNC: 8.5 MMOL/L (ref 5–15)
AST SERPL-CCNC: 29 U/L (ref 1–32)
BILIRUB SERPL-MCNC: 0.4 MG/DL (ref 0–1.2)
BUN SERPL-MCNC: 12 MG/DL (ref 8–23)
BUN/CREAT SERPL: 15.6 (ref 7–25)
CALCIUM SPEC-SCNC: 9.3 MG/DL (ref 8.6–10.5)
CHLORIDE SERPL-SCNC: 101 MMOL/L (ref 98–107)
CHOLEST SERPL-MCNC: 125 MG/DL (ref 0–200)
CO2 SERPL-SCNC: 28.5 MMOL/L (ref 22–29)
CREAT SERPL-MCNC: 0.77 MG/DL (ref 0.57–1)
GFR SERPL CREATININE-BSD FRML MDRD: 75 ML/MIN/1.73
GLOBULIN UR ELPH-MCNC: 3 GM/DL
GLUCOSE SERPL-MCNC: 115 MG/DL (ref 65–99)
HDLC SERPL-MCNC: 60 MG/DL (ref 40–60)
LDLC SERPL CALC-MCNC: 52 MG/DL (ref 0–100)
LDLC/HDLC SERPL: 0.88 {RATIO}
POTASSIUM SERPL-SCNC: 4.3 MMOL/L (ref 3.5–5.2)
PROT SERPL-MCNC: 7.3 G/DL (ref 6–8.5)
SODIUM SERPL-SCNC: 138 MMOL/L (ref 136–145)
TRIGL SERPL-MCNC: 62 MG/DL (ref 0–150)
VLDLC SERPL-MCNC: 13 MG/DL (ref 5–40)

## 2021-03-18 PROCEDURE — 36415 COLL VENOUS BLD VENIPUNCTURE: CPT | Performed by: FAMILY MEDICINE

## 2021-03-18 PROCEDURE — 80061 LIPID PANEL: CPT | Performed by: FAMILY MEDICINE

## 2021-03-18 PROCEDURE — 80053 COMPREHEN METABOLIC PANEL: CPT | Performed by: FAMILY MEDICINE

## 2021-03-19 DIAGNOSIS — R73.9 ELEVATED BLOOD SUGAR: Primary | ICD-10-CM

## 2021-03-24 DIAGNOSIS — K76.89 LIVER CYST: Primary | ICD-10-CM

## 2021-03-27 DIAGNOSIS — F41.9 ANXIETY: ICD-10-CM

## 2021-03-27 RX ORDER — LORAZEPAM 1 MG/1
TABLET ORAL
Qty: 30 TABLET | Refills: 0 | Status: SHIPPED | OUTPATIENT
Start: 2021-03-27 | End: 2021-04-30

## 2021-04-06 ENCOUNTER — LAB (OUTPATIENT)
Dept: FAMILY MEDICINE CLINIC | Facility: CLINIC | Age: 66
End: 2021-04-06

## 2021-04-06 PROCEDURE — 83036 HEMOGLOBIN GLYCOSYLATED A1C: CPT | Performed by: FAMILY MEDICINE

## 2021-04-06 PROCEDURE — 36415 COLL VENOUS BLD VENIPUNCTURE: CPT | Performed by: FAMILY MEDICINE

## 2021-04-07 LAB — HBA1C MFR BLD: 5.8 % (ref 3.5–5.6)

## 2021-04-10 DIAGNOSIS — Z23 NEED FOR VACCINATION: ICD-10-CM

## 2021-04-14 ENCOUNTER — OFFICE VISIT (OUTPATIENT)
Dept: FAMILY MEDICINE CLINIC | Facility: CLINIC | Age: 66
End: 2021-04-14

## 2021-04-14 VITALS
RESPIRATION RATE: 16 BRPM | BODY MASS INDEX: 18.61 KG/M2 | WEIGHT: 109 LBS | OXYGEN SATURATION: 98 % | SYSTOLIC BLOOD PRESSURE: 136 MMHG | TEMPERATURE: 97.5 F | DIASTOLIC BLOOD PRESSURE: 64 MMHG | HEIGHT: 64 IN | HEART RATE: 90 BPM

## 2021-04-14 DIAGNOSIS — M81.6 LOCALIZED OSTEOPOROSIS, UNSPECIFIED PATHOLOGICAL FRACTURE PRESENCE: Primary | ICD-10-CM

## 2021-04-14 DIAGNOSIS — F17.200 TOBACCO DEPENDENCE: ICD-10-CM

## 2021-04-14 DIAGNOSIS — S60.511A ABRASION OF RIGHT HAND, INITIAL ENCOUNTER: ICD-10-CM

## 2021-04-14 DIAGNOSIS — I73.9 PERIPHERAL ARTERIAL DISEASE (HCC): ICD-10-CM

## 2021-04-14 DIAGNOSIS — Z23 NEED FOR VACCINATION: ICD-10-CM

## 2021-04-14 DIAGNOSIS — I70.90 ATHEROSCLEROSIS: ICD-10-CM

## 2021-04-14 PROBLEM — R14.0 BLOATING: Status: RESOLVED | Noted: 2018-12-18 | Resolved: 2021-04-14

## 2021-04-14 PROBLEM — R07.89 CHEST WALL PAIN: Status: RESOLVED | Noted: 2020-09-04 | Resolved: 2021-04-14

## 2021-04-14 PROBLEM — I79.8 OTHER DISORDERS OF ARTERIES, ARTERIOLES AND CAPILLARIES IN DISEASES CLASSIFIED ELSEWHERE: Status: RESOLVED | Noted: 2021-03-16 | Resolved: 2021-04-14

## 2021-04-14 PROCEDURE — 99214 OFFICE O/P EST MOD 30 MIN: CPT | Performed by: FAMILY MEDICINE

## 2021-04-14 PROCEDURE — 90714 TD VACC NO PRESV 7 YRS+ IM: CPT | Performed by: FAMILY MEDICINE

## 2021-04-14 PROCEDURE — 90471 IMMUNIZATION ADMIN: CPT | Performed by: FAMILY MEDICINE

## 2021-04-14 RX ORDER — ROSUVASTATIN CALCIUM 5 MG/1
5 TABLET, COATED ORAL
Qty: 90 TABLET | Refills: 1 | Status: SHIPPED | OUTPATIENT
Start: 2021-04-14 | End: 2021-06-06

## 2021-04-14 NOTE — PROGRESS NOTES
Subjective   Chief Complaint   Patient presents with   • Discuss Results     DEXA scan, review abdominal and vascular ultrasound   • Osteoporosis   • Atherosclerosis     Shauna Marks is a 65 y.o. female.     Patient Care Team:  Talia Grey MD as PCP - General (Family Medicine)  Asa Greenwood MD as Consulting Physician (Vascular Surgery)    She is coming in today as she wants to discuss her recent bone density results.  She also wants to go over her previous bone density results and review and discuss her recent ultrasounds.  She provided a copy of her bone density from 2016 and 2019.  She was previously told about bone loss and osteoporosis range.  She currently takes calcium and vitamin D supplementation, biphosphonate's were advised for her in the past, however she was and still is hesitant about that due to possible side effects.  Her recent abdominal and carotid ultrasound show some atherosclerosis buildup, but there are no signs consistent with stenosis.  She is a smoker and has been for a long time.  She understands that smoking cessation is important for her, but this has been a big cody for her.  She was started on a statin last year and she has been compliant with this medicine.  She is also currently being treated for hypertension.  She was even seen and evaluated by vascular specialist last year.  She also tells me that she has a abrasion on the right hand and she is not sure when her last tetanus shot was and she would like that to be updated.       The following portions of the patient's history were reviewed and updated as appropriate: allergies, current medications, past family history, past medical history, past social history, past surgical history and problem list.  Past Medical History:   Diagnosis Date   • Anxiety    • GERD (gastroesophageal reflux disease)    • History of transfusion    • Hypertension    • IBS (irritable bowel syndrome)    • Osteoporosis      Past Surgical  "History:   Procedure Laterality Date   • BREAST SURGERY      biopsies   • CARDIAC CATHETERIZATION     • COLONOSCOPY  2014   • COLONOSCOPY W/ POLYPECTOMY N/A 10/28/2020    Procedure: Colonoscopy with polypectomy x 10;  Surgeon: Sudarshan Mitchell MD;  Location: [facility] ENDOSCOPY;  Service: Gastroenterology;  Laterality: N/A;  colon polyps x 10,  hemorrhoids   • HERNIA REPAIR     • LASER ABLATION CONDYLOMA CERVICAL / VULVAR     • SKIN BIOPSY     • SPLENECTOMY  2014     The patient has a family history of  Family History   Problem Relation Age of Onset   • Lung cancer Mother    • Coronary artery disease Father      Social History     Socioeconomic History   • Marital status: Significant Other     Spouse name: Not on file   • Number of children: Not on file   • Years of education: Not on file   • Highest education level: Not on file   Tobacco Use   • Smoking status: Current Every Day Smoker     Packs/day: 0.50     Types: Cigarettes   • Smokeless tobacco: Never Used   Substance and Sexual Activity   • Alcohol use: Yes     Comment: social   • Drug use: No   • Sexual activity: Defer       Review of Systems   Constitutional: Negative for activity change, fatigue and fever.   Respiratory: Negative for shortness of breath and wheezing.    Cardiovascular: Negative for chest pain, palpitations and leg swelling.     Visit Vitals  /64 (BP Location: Left arm, Patient Position: Sitting, Cuff Size: Adult)   Pulse 90   Temp 97.5 °F (36.4 °C) (Temporal)   Resp 16   Ht 162.6 cm (64\")   Wt 49.4 kg (109 lb)   SpO2 98%   BMI 18.71 kg/m²       Current Outpatient Medications:   •  acyclovir (ZOVIRAX) 400 MG tablet, Take 1 tablet by mouth Daily., Disp: 90 tablet, Rfl: 1  •  betamethasone valerate (VALISONE) 0.1 % ointment, As Needed., Disp: , Rfl:   •  clobetasol (TEMOVATE) 0.05 % ointment, As Needed., Disp: , Rfl:   •  cycloSPORINE (Restasis) 0.05 % ophthalmic emulsion, Restasis 0.05 % eye drops in a dropperette, Disp: , Rfl:   •  " dicyclomine (BENTYL) 20 MG tablet, Take 20 mg by mouth Every 6 (Six) Hours As Needed., Disp: , Rfl:   •  Digestive Enzymes capsule, Daily., Disp: , Rfl:   •  enalapril (VASOTEC) 5 MG tablet, Take 1 tablet by mouth 2 (Two) Times a Day. 1 pill p.o. in the morning and half a pill p.o. in the evening, Disp: 135 tablet, Rfl: 1  •  hydrocortisone 2.5 % cream, APPLY A SMALL AMOUNT AFFECTED AREA TWICE A DAY, Disp: , Rfl:   •  Lactobacillus Acid-Pectin (ACIDOPHILUS/PECTIN) capsule, Take 1 capsule by mouth Daily., Disp: , Rfl:   •  LORazepam (ATIVAN) 1 MG tablet, TAKE ONE TABLET BY MOUTH EVERY NIGHT AT BEDTIME AS NEEDED FOR ANXIETY, Disp: 30 tablet, Rfl: 0  •  Loteprednol Etabonate (Lotemax) 0.5 % ointment, Lotemax 0.5 % eye ointment, Disp: , Rfl:   •  Magnesium 200 MG tablet, Daily., Disp: , Rfl:   •  Multiple Vitamins-Minerals (multivitamin with minerals) tablet tablet, Take 1 tablet by mouth Daily. Hold x5 days, Disp: , Rfl:   •  polyethylene glycol (MIRALAX) powder powder, Take 17 g by mouth As Needed., Disp: , Rfl:   •  Probiotic Product (PROBIOTIC-10 PO), , Disp: , Rfl:   •  rosuvastatin (CRESTOR) 5 MG tablet, Take 1 tablet by mouth every night at bedtime., Disp: 90 tablet, Rfl: 1  •  vitamin C (ASCORBIC ACID) 500 MG tablet, Take 1,000 mg by mouth Daily., Disp: , Rfl:     Objective   Physical Exam  Constitutional:       General: She is not in acute distress.     Appearance: Normal appearance. She is well-developed. She is not ill-appearing or diaphoretic.      Comments: Patient is in no distress, patient has normal voice and speech.  Normal respiratory effort.   HENT:      Head: Normocephalic and atraumatic.   Pulmonary:      Effort: Pulmonary effort is normal.   Musculoskeletal:      Cervical back: Normal range of motion and neck supple.   Skin:     Comments: There is a small abrasion on the dorsum of the right hand.   Neurological:      General: No focal deficit present.      Mental Status: She is alert and oriented  to person, place, and time. Mental status is at baseline.   Psychiatric:         Mood and Affect: Mood normal.         No visits with results within 7 Day(s) from this visit.   Latest known visit with results is:   Orders Only on 03/19/2021   Component Date Value Ref Range Status   • Hemoglobin A1C 04/06/2021 5.8* 3.5 - 5.6 % Final       FOLLOWING LABS WERE REVIEWED TODAY:  CMP    CMP 5/21/20 9/10/20 3/18/21   Glucose  102 (A) 115 (A)   Glucose 106 (A)     BUN 11 10 12   Creatinine 0.7 0.77 0.77   eGFR Non African Am  75 75   Sodium 137 138 138   Potassium 4.9 4.2 4.3   Chloride 98 99 101   Calcium 10.1 9.5 9.3   Albumin 4.7 4.80 4.30   Total Bilirubin 0.4 0.4 0.4   Alkaline Phosphatase 73 75 71   AST (SGOT) 33 25 29   ALT (SGPT) 15 15 19   (A) Abnormal value            CBC    CBC 5/21/20 9/10/20   WBC 11.46 (A) 12.74 (A)   RBC 4.62 4.32   Hemoglobin 15.2 14.1   Hematocrit 46.4 (A) 41.5   .4 (A) 96.1   MCH 32.9 32.6   MCHC 32.8 34.0   RDW 14.8 12.2 (A)   Platelets 320 297   (A) Abnormal value                Assessment/Plan   Diagnoses and all orders for this visit:    1. Localized osteoporosis, unspecified pathological fracture presence (Primary)    2. Need for vaccination  -     Td Vaccine Greater Than or Equal To 6yo Preservative Free IM    3. Peripheral arterial disease (CMS/HCC)  -     rosuvastatin (CRESTOR) 5 MG tablet; Take 1 tablet by mouth every night at bedtime.  Dispense: 90 tablet; Refill: 1    4. Atherosclerosis  -     rosuvastatin (CRESTOR) 5 MG tablet; Take 1 tablet by mouth every night at bedtime.  Dispense: 90 tablet; Refill: 1    5. Tobacco dependence    6. Abrasion of right hand, initial encounter   -     Td Vaccine Greater Than or Equal To 6yo Preservative Free IM      I reviewed her recent DEXA scan results.  It shows bone loss consistent with osteoporosis in her hips and osteopenia in the lumbar spine.  When compared to DEXA from 2016 and 2019 there is some mild progression.  I discussed  with her the fact that low bone density is significantly increasing the risk of fracture and considering the degree of the bone loss I suggested to strongly consider a prescription medication for osteoporosis.  She is hesitant about biphosphonates, we also discussed Prolia shot and she wants to think about that.  I also reviewed her recent abdominal ultrasound as well as carotid ultrasounds.  There are some atherosclerotic changes, but no clear stenosis.  I suggested to continue work on lifestyle modifications and medical risk management including working on good blood pressure control, continue statin, and continue to work on the smoking cessation.  All questions were answered today to my best knowledge and her satisfaction.  DEXA scan scans and her ultrasound reports are scanned in a media section of her chart.            Return in about 6 months (around 10/14/2021) for Next scheduled follow up.    Requested Prescriptions     Signed Prescriptions Disp Refills   • rosuvastatin (CRESTOR) 5 MG tablet 90 tablet 1     Sig: Take 1 tablet by mouth every night at bedtime.

## 2021-04-23 ENCOUNTER — OFFICE (OUTPATIENT)
Dept: URBAN - METROPOLITAN AREA CLINIC 64 | Facility: CLINIC | Age: 66
End: 2021-04-23

## 2021-04-23 VITALS
SYSTOLIC BLOOD PRESSURE: 131 MMHG | HEART RATE: 94 BPM | WEIGHT: 110 LBS | DIASTOLIC BLOOD PRESSURE: 77 MMHG | HEIGHT: 64 IN

## 2021-04-23 DIAGNOSIS — R93.3 ABNORMAL FINDINGS ON DIAGNOSTIC IMAGING OF OTHER PARTS OF DI: ICD-10-CM

## 2021-04-23 DIAGNOSIS — R10.31 RIGHT LOWER QUADRANT PAIN: ICD-10-CM

## 2021-04-23 DIAGNOSIS — K59.00 CONSTIPATION, UNSPECIFIED: ICD-10-CM

## 2021-04-23 DIAGNOSIS — K64.8 OTHER HEMORRHOIDS: ICD-10-CM

## 2021-04-23 PROCEDURE — 99213 OFFICE O/P EST LOW 20 MIN: CPT | Performed by: NURSE PRACTITIONER

## 2021-04-30 DIAGNOSIS — F41.9 ANXIETY: ICD-10-CM

## 2021-04-30 RX ORDER — LORAZEPAM 1 MG/1
TABLET ORAL
Qty: 30 TABLET | Refills: 0 | Status: SHIPPED | OUTPATIENT
Start: 2021-04-30 | End: 2021-06-06

## 2021-05-17 ENCOUNTER — OFFICE VISIT (OUTPATIENT)
Dept: FAMILY MEDICINE CLINIC | Facility: CLINIC | Age: 66
End: 2021-05-17

## 2021-05-17 VITALS
BODY MASS INDEX: 18.27 KG/M2 | HEART RATE: 89 BPM | TEMPERATURE: 96.8 F | WEIGHT: 107 LBS | HEIGHT: 64 IN | OXYGEN SATURATION: 99 % | SYSTOLIC BLOOD PRESSURE: 144 MMHG | DIASTOLIC BLOOD PRESSURE: 77 MMHG | RESPIRATION RATE: 16 BRPM

## 2021-05-17 DIAGNOSIS — L73.9 FOLLICULITIS: Primary | ICD-10-CM

## 2021-05-17 DIAGNOSIS — C51.9 VULVAR CARCINOMA (HCC): ICD-10-CM

## 2021-05-17 DIAGNOSIS — K76.89 LIVER CYST: ICD-10-CM

## 2021-05-17 PROCEDURE — 99213 OFFICE O/P EST LOW 20 MIN: CPT | Performed by: FAMILY MEDICINE

## 2021-05-17 RX ORDER — DOXYCYCLINE HYCLATE 100 MG
100 TABLET ORAL 2 TIMES DAILY
Qty: 14 TABLET | Refills: 0 | Status: SHIPPED | OUTPATIENT
Start: 2021-05-17 | End: 2021-05-24

## 2021-05-17 NOTE — PROGRESS NOTES
Subjective   Chief Complaint   Patient presents with   • Skin issue in the right groin area   • Liver cyst     Shauna Marks is a 66 y.o. female.     Patient Care Team:  Talia Grey MD as PCP - General (Family Medicine)  Asa Greenwood MD as Consulting Physician (Vascular Surgery)    She is coming in today due to skin issues, which have been bothering her for the last couple of days.  She tells me that she recently returned from a trip home and she was sitting in a car for up to 10 hours.  On 5/15/2020 1 in the evening she noted a sore spot in the right groin area and she is concerned and would like that to be checked out.  It is causing some mild discomfort, however she denies any drainage.  She previously was diagnosed with vulvar cancer and she is still under close surveillance of the specialist.  She also was previously diagnosed with genital herpes.  She also wants to talk about her liver cyst.  She has had few ultrasounds in the past and the most recent one was in 4/2021 and it showed some increase in size of the liver cyst.  She was seen and evaluated by the GI office and saw the nurse practitioner who was recommending to proceed with MRI.  However she prefers to wait with this test and rather discuss it with her GI physician who she will be seen a few weeks down the road and she would like to also get my opinion.       The following portions of the patient's history were reviewed and updated as appropriate: allergies, current medications, past family history, past medical history, past social history, past surgical history and problem list.  Past Medical History:   Diagnosis Date   • Anxiety    • GERD (gastroesophageal reflux disease)    • History of transfusion    • Hypertension    • IBS (irritable bowel syndrome)    • Osteoporosis    • Vulva cancer (CMS/HCC) 2014    Dr. Patel     Past Surgical History:   Procedure Laterality Date   • BREAST SURGERY      biopsies   • CARDIAC CATHETERIZATION  "    • COLONOSCOPY  2014   • COLONOSCOPY W/ POLYPECTOMY N/A 10/28/2020    Procedure: Colonoscopy with polypectomy x 10;  Surgeon: Sudarshan Mitchell MD;  Location: [facility] ENDOSCOPY;  Service: Gastroenterology;  Laterality: N/A;  colon polyps x 10,  hemorrhoids   • HERNIA REPAIR     • LASER ABLATION CONDYLOMA CERVICAL / VULVAR     • SKIN BIOPSY     • SPLENECTOMY  2014     The patient has a family history of  Family History   Problem Relation Age of Onset   • Lung cancer Mother    • Coronary artery disease Father      Social History     Socioeconomic History   • Marital status: Significant Other     Spouse name: Not on file   • Number of children: Not on file   • Years of education: Not on file   • Highest education level: Not on file   Tobacco Use   • Smoking status: Current Every Day Smoker     Packs/day: 0.50     Types: Cigarettes   • Smokeless tobacco: Never Used   Substance and Sexual Activity   • Alcohol use: Yes     Comment: social   • Drug use: No   • Sexual activity: Defer       Review of Systems   Constitutional: Negative for diaphoresis and fever.   Skin: Positive for color change and skin lesions. Negative for dry skin, pallor and rash.     Visit Vitals  /77 (BP Location: Left arm, Patient Position: Sitting, Cuff Size: Adult)   Pulse 89   Temp 96.8 °F (36 °C) (Temporal)   Resp 16   Ht 162.6 cm (64\")   Wt 48.5 kg (107 lb)   SpO2 99%   BMI 18.37 kg/m²       Current Outpatient Medications:   •  acyclovir (ZOVIRAX) 400 MG tablet, Take 1 tablet by mouth Daily., Disp: 90 tablet, Rfl: 1  •  betamethasone valerate (VALISONE) 0.1 % ointment, As Needed., Disp: , Rfl:   •  clobetasol (TEMOVATE) 0.05 % ointment, As Needed., Disp: , Rfl:   •  cycloSPORINE (Restasis) 0.05 % ophthalmic emulsion, Restasis 0.05 % eye drops in a dropperette, Disp: , Rfl:   •  dicyclomine (BENTYL) 20 MG tablet, Take 20 mg by mouth Every 6 (Six) Hours As Needed., Disp: , Rfl:   •  Digestive Enzymes capsule, Daily., Disp: , Rfl:   •  " doxycycline (VIBRAMYICN) 100 MG tablet, Take 1 tablet by mouth 2 (Two) Times a Day for 7 days., Disp: 14 tablet, Rfl: 0  •  enalapril (VASOTEC) 5 MG tablet, Take 1 tablet by mouth 2 (Two) Times a Day. 1 pill p.o. in the morning and half a pill p.o. in the evening, Disp: 135 tablet, Rfl: 1  •  hydrocortisone 2.5 % cream, APPLY A SMALL AMOUNT AFFECTED AREA TWICE A DAY, Disp: , Rfl:   •  Lactobacillus Acid-Pectin (ACIDOPHILUS/PECTIN) capsule, Take 1 capsule by mouth Daily., Disp: , Rfl:   •  LORazepam (ATIVAN) 1 MG tablet, TAKE ONE TABLET BY MOUTH EVERY NIGHT AT BEDTIME AS NEEDED FOR ANXIETY, Disp: 30 tablet, Rfl: 0  •  Loteprednol Etabonate (Lotemax) 0.5 % ointment, Lotemax 0.5 % eye ointment, Disp: , Rfl:   •  Magnesium 200 MG tablet, Daily., Disp: , Rfl:   •  Multiple Vitamins-Minerals (multivitamin with minerals) tablet tablet, Take 1 tablet by mouth Daily. Hold x5 days, Disp: , Rfl:   •  polyethylene glycol (MIRALAX) powder powder, Take 17 g by mouth As Needed., Disp: , Rfl:   •  Probiotic Product (PROBIOTIC-10 PO), , Disp: , Rfl:   •  rosuvastatin (CRESTOR) 5 MG tablet, Take 1 tablet by mouth every night at bedtime., Disp: 90 tablet, Rfl: 1  •  vitamin C (ASCORBIC ACID) 500 MG tablet, Take 1,000 mg by mouth Daily., Disp: , Rfl:     Objective   Physical Exam  Constitutional:       General: She is not in acute distress.     Appearance: Normal appearance. She is well-developed. She is not ill-appearing or diaphoretic.      Comments: Patient is in no distress, patient has normal voice and speech.  Normal respiratory effort.   HENT:      Head: Normocephalic and atraumatic.   Pulmonary:      Effort: Pulmonary effort is normal.   Musculoskeletal:      Cervical back: Normal range of motion and neck supple.   Skin:     Comments: There is a small area of inflammation noted in the right groin consistent with a mild folliculitis.   Neurological:      General: No focal deficit present.      Mental Status: She is alert and  oriented to person, place, and time. Mental status is at baseline.   Psychiatric:         Mood and Affect: Mood normal.         FOLLOWING LABS WERE REVIEWED TODAY:  Liver ultrasound dated 4/6/2021 -copy of the report was scanned in a media section of her chart.    Assessment/Plan   Diagnoses and all orders for this visit:    1. Folliculitis (Primary)  -     doxycycline (VIBRAMYICN) 100 MG tablet; Take 1 tablet by mouth 2 (Two) Times a Day for 7 days.  Dispense: 14 tablet; Refill: 0    2. Vulvar carcinoma (CMS/HCC)    3. Liver cyst      The area of the concern in the right groin area is consistent with a mild folliculitis.  I am giving her prescription for doxycycline, but I advised to give it a few more days to see if this actually will resolve on its own, if no improvement she will be starting the antibiotic.  She was previously diagnosed with vulvar carcinoma and she is still under care of a specialist for the surveillance.  I reviewed her most recent liver ultrasound dated 4/6/2021 and also the office note from the GI office from earlier this week.  Due to some increase in size in the liver cyst MRI of the abdomen was recommended, she prefers to wait and discuss it with her GI physician.  All questions were answered today to my best knowledge and patient's satisfaction.          Return if symptoms worsen or fail to improve, for Recheck.    Requested Prescriptions     Signed Prescriptions Disp Refills   • doxycycline (VIBRAMYICN) 100 MG tablet 14 tablet 0     Sig: Take 1 tablet by mouth 2 (Two) Times a Day for 7 days.

## 2021-05-20 ENCOUNTER — TELEPHONE (OUTPATIENT)
Dept: FAMILY MEDICINE CLINIC | Facility: CLINIC | Age: 66
End: 2021-05-20

## 2021-05-20 ENCOUNTER — OFFICE VISIT (OUTPATIENT)
Dept: FAMILY MEDICINE CLINIC | Facility: CLINIC | Age: 66
End: 2021-05-20

## 2021-05-20 VITALS
TEMPERATURE: 98.7 F | HEART RATE: 87 BPM | DIASTOLIC BLOOD PRESSURE: 78 MMHG | OXYGEN SATURATION: 96 % | BODY MASS INDEX: 18.27 KG/M2 | RESPIRATION RATE: 16 BRPM | SYSTOLIC BLOOD PRESSURE: 131 MMHG | WEIGHT: 107 LBS | HEIGHT: 64 IN
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
## 2021-05-20 DIAGNOSIS — S00.06XA INSECT BITE OF SCALP, INITIAL ENCOUNTER: ICD-10-CM

## 2021-05-20 DIAGNOSIS — R21 RASH: Primary | ICD-10-CM

## 2021-05-20 PROCEDURE — 99213 OFFICE O/P EST LOW 20 MIN: CPT | Performed by: FAMILY MEDICINE

## 2021-05-20 RX ORDER — PREDNISONE 10 MG/1
10 TABLET ORAL DAILY
Qty: 6 TABLET | Refills: 0 | Status: SHIPPED | OUTPATIENT
Start: 2021-05-20 | End: 2021-05-21 | Stop reason: SDUPTHER

## 2021-05-20 NOTE — TELEPHONE ENCOUNTER
SHE HAS AN APPT THIS AFTERNOON, BUT IS IN A LOT OF PAIN WITH THIS BUG BITE. SHE WANTS TO KNOW IF THERE'S SOMETHING OVER THE COUNTER SHE CAN TAKE IN THE MEANTIME.

## 2021-05-20 NOTE — TELEPHONE ENCOUNTER
She has so much pain, she definitely needs to be checked.  She can take Tylenol over-the-counter.  Thank you.

## 2021-05-20 NOTE — PROGRESS NOTES
Subjective   Chief Complaint   Patient presents with   • Insect Bite     forehead     Shauna Marks is a 66 y.o. female.     Patient Care Team:  Talia Grey MD as PCP - General (Family Medicine)  Asa Greenwood MD as Consulting Physician (Vascular Surgery)    She is coming in today due to a insect bite yesterday.  She tells me that she was working outside in the yard and she had a hat on and she felt something flew under the hat and bit her on the right side of her forehead.  It caused some pain and redness.  She has used ice packs and also took some Benadryl last night.  She denies any itchiness now, even the pain is better now.  She denies any other skin issues.  She also has some steroid cream at home and she try to use that as well.  No other symptoms reported.       The following portions of the patient's history were reviewed and updated as appropriate: allergies, current medications, past family history, past medical history, past social history, past surgical history and problem list.  Past Medical History:   Diagnosis Date   • Anxiety    • GERD (gastroesophageal reflux disease)    • History of transfusion    • Hypertension    • IBS (irritable bowel syndrome)    • Osteoporosis    • Vulva cancer (CMS/HCC) 2014    Dr. Patel     Past Surgical History:   Procedure Laterality Date   • BREAST SURGERY      biopsies   • CARDIAC CATHETERIZATION     • COLONOSCOPY  2014   • COLONOSCOPY W/ POLYPECTOMY N/A 10/28/2020    Procedure: Colonoscopy with polypectomy x 10;  Surgeon: Sudarshan Mitchell MD;  Location: [facility] ENDOSCOPY;  Service: Gastroenterology;  Laterality: N/A;  colon polyps x 10,  hemorrhoids   • HERNIA REPAIR     • LASER ABLATION CONDYLOMA CERVICAL / VULVAR     • SKIN BIOPSY     • SPLENECTOMY  2014     The patient has a family history of  Family History   Problem Relation Age of Onset   • Lung cancer Mother    • Coronary artery disease Father      Social History     Socioeconomic History  "  • Marital status: Significant Other     Spouse name: Not on file   • Number of children: Not on file   • Years of education: Not on file   • Highest education level: Not on file   Tobacco Use   • Smoking status: Current Every Day Smoker     Packs/day: 0.50     Types: Cigarettes   • Smokeless tobacco: Never Used   Substance and Sexual Activity   • Alcohol use: Yes     Comment: social   • Drug use: No   • Sexual activity: Defer       Review of Systems   Constitutional: Negative for chills, fatigue and fever.   Skin: Positive for rash. Negative for color change, pallor and skin lesions.     Visit Vitals  /78 (BP Location: Left arm, Patient Position: Sitting, Cuff Size: Adult)   Pulse 87   Temp 98.7 °F (37.1 °C) (Temporal)   Resp 16   Ht 162.6 cm (64\")   Wt 48.5 kg (107 lb)   SpO2 96%   BMI 18.37 kg/m²       Current Outpatient Medications:   •  acyclovir (ZOVIRAX) 400 MG tablet, Take 1 tablet by mouth Daily., Disp: 90 tablet, Rfl: 1  •  betamethasone valerate (VALISONE) 0.1 % ointment, As Needed., Disp: , Rfl:   •  clobetasol (TEMOVATE) 0.05 % ointment, As Needed., Disp: , Rfl:   •  cycloSPORINE (Restasis) 0.05 % ophthalmic emulsion, Restasis 0.05 % eye drops in a dropperette, Disp: , Rfl:   •  dicyclomine (BENTYL) 20 MG tablet, Take 20 mg by mouth Every 6 (Six) Hours As Needed., Disp: , Rfl:   •  Digestive Enzymes capsule, Daily., Disp: , Rfl:   •  doxycycline (VIBRAMYICN) 100 MG tablet, Take 1 tablet by mouth 2 (Two) Times a Day for 7 days., Disp: 14 tablet, Rfl: 0  •  enalapril (VASOTEC) 5 MG tablet, Take 1 tablet by mouth 2 (Two) Times a Day. 1 pill p.o. in the morning and half a pill p.o. in the evening, Disp: 135 tablet, Rfl: 1  •  hydrocortisone 2.5 % cream, APPLY A SMALL AMOUNT AFFECTED AREA TWICE A DAY, Disp: , Rfl:   •  Lactobacillus Acid-Pectin (ACIDOPHILUS/PECTIN) capsule, Take 1 capsule by mouth Daily., Disp: , Rfl:   •  LORazepam (ATIVAN) 1 MG tablet, TAKE ONE TABLET BY MOUTH EVERY NIGHT AT BEDTIME " AS NEEDED FOR ANXIETY, Disp: 30 tablet, Rfl: 0  •  Loteprednol Etabonate (Lotemax) 0.5 % ointment, Lotemax 0.5 % eye ointment, Disp: , Rfl:   •  Magnesium 200 MG tablet, Daily., Disp: , Rfl:   •  Multiple Vitamins-Minerals (multivitamin with minerals) tablet tablet, Take 1 tablet by mouth Daily. Hold x5 days, Disp: , Rfl:   •  polyethylene glycol (MIRALAX) powder powder, Take 17 g by mouth As Needed., Disp: , Rfl:   •  predniSONE (DELTASONE) 10 MG tablet, Take 1 tablet by mouth Daily., Disp: 6 tablet, Rfl: 0  •  Probiotic Product (PROBIOTIC-10 PO), , Disp: , Rfl:   •  rosuvastatin (CRESTOR) 5 MG tablet, Take 1 tablet by mouth every night at bedtime., Disp: 90 tablet, Rfl: 1  •  vitamin C (ASCORBIC ACID) 500 MG tablet, Take 1,000 mg by mouth Daily., Disp: , Rfl:     Objective   Physical Exam  Constitutional:       General: She is not in acute distress.     Appearance: Normal appearance. She is well-developed. She is not ill-appearing or diaphoretic.      Comments: Patient is in no distress, patient has normal voice and speech.  Normal respiratory effort.   HENT:      Head: Normocephalic and atraumatic.   Pulmonary:      Effort: Pulmonary effort is normal.   Musculoskeletal:      Cervical back: Normal range of motion and neck supple.   Skin:     Comments: There is a small area of macular rash noted on the right side of the forehead does beneath the hairline, there is a central area of redness consistent with possible insect bite.  No petechiae.   Neurological:      General: No focal deficit present.      Mental Status: She is alert and oriented to person, place, and time. Mental status is at baseline.   Psychiatric:         Mood and Affect: Mood normal.         Assessment/Plan   Diagnoses and all orders for this visit:    1. Rash (Primary)  -     predniSONE (DELTASONE) 10 MG tablet; Take 1 tablet by mouth Daily.  Dispense: 6 tablet; Refill: 0    2. Insect bite of scalp, initial encounter  -     predniSONE (DELTASONE)  10 MG tablet; Take 1 tablet by mouth Daily.  Dispense: 6 tablet; Refill: 0      She may continue cool compresses to the area and she may also take over-the-counter antihistamine and she may use topical steroid cream.  I also gave her prescription for a few prednisone pills to take for the next couple of days.  She was advised to monitor the area and call us back if any concerns.        Return if symptoms worsen or fail to improve, for Recheck.    Requested Prescriptions     Signed Prescriptions Disp Refills   • predniSONE (DELTASONE) 10 MG tablet 6 tablet 0     Sig: Take 1 tablet by mouth Daily.

## 2021-05-21 DIAGNOSIS — S00.06XA INSECT BITE OF SCALP, INITIAL ENCOUNTER: ICD-10-CM
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
## 2021-05-21 DIAGNOSIS — R21 RASH: ICD-10-CM

## 2021-05-21 RX ORDER — PREDNISONE 10 MG/1
20 TABLET ORAL 2 TIMES DAILY
Qty: 20 TABLET | Refills: 0 | Status: SHIPPED | OUTPATIENT
Start: 2021-05-21 | End: 2021-05-26

## 2021-06-05 DIAGNOSIS — F41.9 ANXIETY: ICD-10-CM

## 2021-06-06 DIAGNOSIS — I70.90 ATHEROSCLEROSIS: ICD-10-CM

## 2021-06-06 DIAGNOSIS — I73.9 PERIPHERAL ARTERIAL DISEASE (HCC): ICD-10-CM

## 2021-06-06 RX ORDER — ROSUVASTATIN CALCIUM 5 MG/1
TABLET, COATED ORAL
Qty: 90 TABLET | Refills: 0 | Status: SHIPPED | OUTPATIENT
Start: 2021-06-06 | End: 2021-09-07

## 2021-06-06 RX ORDER — LORAZEPAM 1 MG/1
TABLET ORAL
Qty: 30 TABLET | Refills: 0 | Status: SHIPPED | OUTPATIENT
Start: 2021-06-06 | End: 2021-07-07 | Stop reason: SDUPTHER

## 2021-06-09 ENCOUNTER — OFFICE (OUTPATIENT)
Dept: URBAN - METROPOLITAN AREA CLINIC 64 | Facility: CLINIC | Age: 66
End: 2021-06-09

## 2021-06-09 VITALS
SYSTOLIC BLOOD PRESSURE: 118 MMHG | HEIGHT: 64 IN | WEIGHT: 105 LBS | DIASTOLIC BLOOD PRESSURE: 80 MMHG | HEART RATE: 91 BPM

## 2021-06-09 DIAGNOSIS — R14.0 ABDOMINAL DISTENSION (GASEOUS): ICD-10-CM

## 2021-06-09 DIAGNOSIS — Z86.010 PERSONAL HISTORY OF COLONIC POLYPS: ICD-10-CM

## 2021-06-09 DIAGNOSIS — K64.8 OTHER HEMORRHOIDS: ICD-10-CM

## 2021-06-09 DIAGNOSIS — K59.00 CONSTIPATION, UNSPECIFIED: ICD-10-CM

## 2021-06-09 DIAGNOSIS — R93.3 ABNORMAL FINDINGS ON DIAGNOSTIC IMAGING OF OTHER PARTS OF DI: ICD-10-CM

## 2021-06-09 PROCEDURE — 99213 OFFICE O/P EST LOW 20 MIN: CPT | Performed by: INTERNAL MEDICINE

## 2021-07-07 ENCOUNTER — LAB (OUTPATIENT)
Dept: FAMILY MEDICINE CLINIC | Facility: CLINIC | Age: 66
End: 2021-07-07

## 2021-07-07 ENCOUNTER — OFFICE VISIT (OUTPATIENT)
Dept: FAMILY MEDICINE CLINIC | Facility: CLINIC | Age: 66
End: 2021-07-07

## 2021-07-07 VITALS
WEIGHT: 105 LBS | RESPIRATION RATE: 16 BRPM | TEMPERATURE: 97.1 F | HEART RATE: 82 BPM | HEIGHT: 64 IN | DIASTOLIC BLOOD PRESSURE: 80 MMHG | SYSTOLIC BLOOD PRESSURE: 148 MMHG | BODY MASS INDEX: 17.93 KG/M2 | OXYGEN SATURATION: 96 %

## 2021-07-07 DIAGNOSIS — E04.9 GOITER: ICD-10-CM

## 2021-07-07 DIAGNOSIS — R61 NIGHT SWEATS: ICD-10-CM

## 2021-07-07 DIAGNOSIS — R73.03 PREDIABETES: ICD-10-CM

## 2021-07-07 DIAGNOSIS — F41.9 ANXIETY: ICD-10-CM

## 2021-07-07 DIAGNOSIS — R63.4 WEIGHT LOSS: Primary | ICD-10-CM

## 2021-07-07 PROBLEM — S60.511A ABRASION OF RIGHT HAND: Status: RESOLVED | Noted: 2021-04-14 | Resolved: 2021-07-07

## 2021-07-07 PROCEDURE — 99214 OFFICE O/P EST MOD 30 MIN: CPT | Performed by: FAMILY MEDICINE

## 2021-07-07 PROCEDURE — 82607 VITAMIN B-12: CPT | Performed by: FAMILY MEDICINE

## 2021-07-07 PROCEDURE — 36415 COLL VENOUS BLD VENIPUNCTURE: CPT | Performed by: FAMILY MEDICINE

## 2021-07-07 PROCEDURE — 81003 URINALYSIS AUTO W/O SCOPE: CPT | Performed by: FAMILY MEDICINE

## 2021-07-07 PROCEDURE — 80053 COMPREHEN METABOLIC PANEL: CPT | Performed by: FAMILY MEDICINE

## 2021-07-07 PROCEDURE — 85025 COMPLETE CBC W/AUTO DIFF WBC: CPT | Performed by: FAMILY MEDICINE

## 2021-07-07 PROCEDURE — 83036 HEMOGLOBIN GLYCOSYLATED A1C: CPT | Performed by: FAMILY MEDICINE

## 2021-07-07 PROCEDURE — 84443 ASSAY THYROID STIM HORMONE: CPT | Performed by: FAMILY MEDICINE

## 2021-07-07 PROCEDURE — 84439 ASSAY OF FREE THYROXINE: CPT | Performed by: FAMILY MEDICINE

## 2021-07-07 RX ORDER — BIOTIN 10000 MCG
CAPSULE ORAL
COMMUNITY
End: 2022-03-23

## 2021-07-07 RX ORDER — LORAZEPAM 1 MG/1
1 TABLET ORAL DAILY PRN
Qty: 30 TABLET | Refills: 0 | Status: SHIPPED | OUTPATIENT
Start: 2021-07-07 | End: 2021-08-22

## 2021-07-07 NOTE — PROGRESS NOTES
Subjective   Chief Complaint   Patient presents with   • Night Sweats   • Weight Loss   • concerns about thyroid     Shauna Marks is a 66 y.o. female.     Patient Care Team:  Talia Grey MD as PCP - General (Family Medicine)  Asa Greenwood MD as Consulting Physician (Vascular Surgery)    She is coming in today she wants to talk about some of her symptoms and concerns.  She tells me that for the last month or so she has been noticing some night sweats, it does not happen every night, but it wakes her up feeling sweaty and her shirt is wet and she has to take it off.  She has been having some fatigue throughout the day and she has also noted some weight loss over the last few/several months.  She is concerned about the thyroid and would like some blood work to be done today.  She tells me that several years back she was advised that she had a goiter and had some ultrasounds done, however she is not sure when her last ultrasound was.  Her blood sugar was slightly elevated earlier this year and she at that time had hemoglobin A1c checked as well and both numbers while in the prediabetes range.  She had her last colonoscopy in 10/2020 and she had 10 polyps removed and was advised to come back in 1 year for follow-up colonoscopy.  She also had EGD done in the past but she believes that this was at least 5 years ago.       The following portions of the patient's history were reviewed and updated as appropriate: allergies, current medications, past family history, past medical history, past social history, past surgical history and problem list.  Past Medical History:   Diagnosis Date   • Anxiety    • GERD (gastroesophageal reflux disease)    • History of transfusion    • Hypertension    • IBS (irritable bowel syndrome)    • Osteoporosis    • Vulva cancer (CMS/[facility]) 2014    Dr. Patel     Past Surgical History:   Procedure Laterality Date   • BREAST SURGERY      biopsies   • CARDIAC CATHETERIZATION     •  "COLONOSCOPY  2014   • COLONOSCOPY W/ POLYPECTOMY N/A 10/28/2020    Procedure: Colonoscopy with polypectomy x 10;  Surgeon: Sudarshan Mitchell MD;  Location: [facility] ENDOSCOPY;  Service: Gastroenterology;  Laterality: N/A;  colon polyps x 10,  hemorrhoids   • HERNIA REPAIR     • LASER ABLATION CONDYLOMA CERVICAL / VULVAR     • SKIN BIOPSY     • SPLENECTOMY  2014     The patient has a family history of  Family History   Problem Relation Age of Onset   • Lung cancer Mother    • Coronary artery disease Father      Social History     Socioeconomic History   • Marital status: Significant Other     Spouse name: Not on file   • Number of children: Not on file   • Years of education: Not on file   • Highest education level: Not on file   Tobacco Use   • Smoking status: Current Every Day Smoker     Packs/day: 0.50     Types: Cigarettes   • Smokeless tobacco: Never Used   Substance and Sexual Activity   • Alcohol use: Yes     Comment: social   • Drug use: No   • Sexual activity: Defer       Review of Systems   Constitutional: Positive for fatigue. Negative for activity change and fever.   Respiratory: Negative for shortness of breath and wheezing.    Cardiovascular: Negative for chest pain, palpitations and leg swelling.   Musculoskeletal: Negative for arthralgias and back pain.   Skin: Negative for rash.   Neurological: Negative for tremors and headache.     Visit Vitals  /80   Pulse 82   Temp 97.1 °F (36.2 °C)   Resp 16   Ht 162.6 cm (64\")   Wt 47.6 kg (105 lb)   SpO2 96%   BMI 18.02 kg/m²       Current Outpatient Medications:   •  acyclovir (ZOVIRAX) 400 MG tablet, Take 1 tablet by mouth Daily., Disp: 90 tablet, Rfl: 1  •  betamethasone valerate (VALISONE) 0.1 % ointment, As Needed., Disp: , Rfl:   •  Biotin 10 MG capsule, Take  by mouth., Disp: , Rfl:   •  clobetasol (TEMOVATE) 0.05 % ointment, As Needed., Disp: , Rfl:   •  cycloSPORINE (Restasis) 0.05 % ophthalmic emulsion, Restasis 0.05 % eye drops in a " dropperette, Disp: , Rfl:   •  dicyclomine (BENTYL) 20 MG tablet, Take 20 mg by mouth Every 6 (Six) Hours As Needed., Disp: , Rfl:   •  Digestive Enzymes capsule, Daily., Disp: , Rfl:   •  enalapril (VASOTEC) 5 MG tablet, Take 1 tablet by mouth 2 (Two) Times a Day. 1 pill p.o. in the morning and half a pill p.o. in the evening, Disp: 135 tablet, Rfl: 1  •  hydrocortisone 2.5 % cream, APPLY A SMALL AMOUNT AFFECTED AREA TWICE A DAY, Disp: , Rfl:   •  Lactobacillus Acid-Pectin (ACIDOPHILUS/PECTIN) capsule, Take 1 capsule by mouth Daily., Disp: , Rfl:   •  LORazepam (ATIVAN) 1 MG tablet, Take 1 tablet by mouth Daily As Needed for Anxiety., Disp: 30 tablet, Rfl: 0  •  Loteprednol Etabonate (Lotemax) 0.5 % ointment, Lotemax 0.5 % eye ointment, Disp: , Rfl:   •  Magnesium 200 MG tablet, Daily., Disp: , Rfl:   •  Multiple Vitamins-Minerals (multivitamin with minerals) tablet tablet, Take 1 tablet by mouth Daily. Hold x5 days, Disp: , Rfl:   •  polyethylene glycol (MIRALAX) powder powder, Take 17 g by mouth As Needed., Disp: , Rfl:   •  Probiotic Product (PROBIOTIC-10 PO), , Disp: , Rfl:   •  rosuvastatin (CRESTOR) 5 MG tablet, TAKE ONE TABLET BY MOUTH EVERY NIGHT AT BEDTIME, Disp: 90 tablet, Rfl: 0  •  vitamin C (ASCORBIC ACID) 500 MG tablet, Take 1,000 mg by mouth Daily., Disp: , Rfl:     Objective   Physical Exam  Vitals and nursing note reviewed.   Constitutional:       General: She is not in acute distress.     Appearance: Normal appearance. She is well-developed. She is not ill-appearing.   HENT:      Head: Normocephalic and atraumatic.   Cardiovascular:      Rate and Rhythm: Normal rate and regular rhythm.      Heart sounds: Normal heart sounds. No murmur heard.   No gallop.    Pulmonary:      Effort: Pulmonary effort is normal. No respiratory distress.      Breath sounds: Normal breath sounds. No wheezing, rhonchi or rales.   Chest:      Chest wall: No tenderness.   Musculoskeletal:      Cervical back: Normal range  of motion and neck supple.   Neurological:      General: No focal deficit present.      Mental Status: She is alert and oriented to person, place, and time. Mental status is at baseline.   Psychiatric:         Mood and Affect: Mood normal.         No visits with results within 7 Day(s) from this visit.   Latest known visit with results is:   Orders Only on 03/19/2021   Component Date Value Ref Range Status   • Hemoglobin A1C 04/06/2021 5.8* 3.5 - 5.6 % Final       FOLLOWING LABS WERE REVIEWED TODAY:  CMP    CMP 9/10/20 3/18/21   Glucose 102 (A) 115 (A)   BUN 10 12   Creatinine 0.77 0.77   eGFR Non African Am 75 75   Sodium 138 138   Potassium 4.2 4.3   Chloride 99 101   Calcium 9.5 9.3   Albumin 4.80 4.30   Total Bilirubin 0.4 0.4   Alkaline Phosphatase 75 71   AST (SGOT) 25 29   ALT (SGPT) 15 19   (A) Abnormal value            CBC    CBC 9/10/20   WBC 12.74 (A)   RBC 4.32   Hemoglobin 14.1   Hematocrit 41.5   MCV 96.1   MCH 32.6   MCHC 34.0   RDW 12.2 (A)   Platelets 297   (A) Abnormal value            TSH    TSH 9/10/20   TSH 1.080               Assessment/Plan   Diagnoses and all orders for this visit:    1. Weight loss (Primary)  -     Comprehensive Metabolic Panel  -     TSH  -     T4, Free  -     Urinalysis With Culture If Indicated - Urine, Clean Catch  -     CBC Auto Differential  -     Vitamin B12    2. Night sweats  -     Comprehensive Metabolic Panel  -     TSH  -     T4, Free  -     Urinalysis With Culture If Indicated - Urine, Clean Catch  -     CBC Auto Differential  -     Vitamin B12    3. Prediabetes  -     Hemoglobin A1c    4. Goiter  -     US Thyroid; Future    5. Anxiety  -     LORazepam (ATIVAN) 1 MG tablet; Take 1 tablet by mouth Daily As Needed for Anxiety.  Dispense: 30 tablet; Refill: 0      I reviewed her symptoms and concerns.  I will be getting labs today to rule out medical causes of her symptoms.  I will be getting thyroid ultrasound due to her reported diagnosis of goiter in the past,  her thyroid today however feels normal on exam.  She had her last colonoscopy in 10/2020 and 10 polyps were removed and she will be scheduling follow-up colonoscopy in 1 year.  She possibly might need a upper scope as well due to some weight loss, it looks like she had lost about 6 pounds over the last 3-4 months.  I will advise further once I have the results of the blood work.  She is also being treated for anxiety and she takes lorazepam as needed and refill was given.      Return in about 3 months (around 10/7/2021) for Medicare Wellness.    Requested Prescriptions     Signed Prescriptions Disp Refills   • LORazepam (ATIVAN) 1 MG tablet 30 tablet 0     Sig: Take 1 tablet by mouth Daily As Needed for Anxiety.

## 2021-07-08 ENCOUNTER — TELEPHONE (OUTPATIENT)
Dept: FAMILY MEDICINE CLINIC | Facility: CLINIC | Age: 66
End: 2021-07-08

## 2021-07-08 DIAGNOSIS — D72.829 LEUKOCYTOSIS, UNSPECIFIED TYPE: Primary | ICD-10-CM

## 2021-07-08 LAB
ALBUMIN SERPL-MCNC: 4.6 G/DL (ref 3.5–5.2)
ALBUMIN/GLOB SERPL: 1.6 G/DL
ALP SERPL-CCNC: 67 U/L (ref 39–117)
ALT SERPL W P-5'-P-CCNC: 14 U/L (ref 1–33)
ANION GAP SERPL CALCULATED.3IONS-SCNC: 10.2 MMOL/L (ref 5–15)
AST SERPL-CCNC: 26 U/L (ref 1–32)
BASOPHILS # BLD AUTO: 0.15 10*3/MM3 (ref 0–0.2)
BASOPHILS NFR BLD AUTO: 1.2 % (ref 0–1.5)
BILIRUB SERPL-MCNC: 0.3 MG/DL (ref 0–1.2)
BILIRUB UR QL STRIP: NEGATIVE
BUN SERPL-MCNC: 13 MG/DL (ref 8–23)
BUN/CREAT SERPL: 19.4 (ref 7–25)
CALCIUM SPEC-SCNC: 9.8 MG/DL (ref 8.6–10.5)
CHLORIDE SERPL-SCNC: 101 MMOL/L (ref 98–107)
CLARITY UR: ABNORMAL
CO2 SERPL-SCNC: 27.8 MMOL/L (ref 22–29)
COLOR UR: YELLOW
CREAT SERPL-MCNC: 0.67 MG/DL (ref 0.57–1)
DEPRECATED RDW RBC AUTO: 46.1 FL (ref 37–54)
EOSINOPHIL # BLD AUTO: 0.09 10*3/MM3 (ref 0–0.4)
EOSINOPHIL NFR BLD AUTO: 0.7 % (ref 0.3–6.2)
ERYTHROCYTE [DISTWIDTH] IN BLOOD BY AUTOMATED COUNT: 12.9 % (ref 12.3–15.4)
GFR SERPL CREATININE-BSD FRML MDRD: 88 ML/MIN/1.73
GLOBULIN UR ELPH-MCNC: 2.9 GM/DL
GLUCOSE SERPL-MCNC: 91 MG/DL (ref 65–99)
GLUCOSE UR STRIP-MCNC: NEGATIVE MG/DL
HBA1C MFR BLD: 6 % (ref 3.5–5.6)
HCT VFR BLD AUTO: 41.1 % (ref 34–46.6)
HGB BLD-MCNC: 13.8 G/DL (ref 12–15.9)
HGB UR QL STRIP.AUTO: NEGATIVE
IMM GRANULOCYTES # BLD AUTO: 0.04 10*3/MM3 (ref 0–0.05)
IMM GRANULOCYTES NFR BLD AUTO: 0.3 % (ref 0–0.5)
KETONES UR QL STRIP: NEGATIVE
LEUKOCYTE ESTERASE UR QL STRIP.AUTO: NEGATIVE
LYMPHOCYTES # BLD AUTO: 2.73 10*3/MM3 (ref 0.7–3.1)
LYMPHOCYTES NFR BLD AUTO: 21 % (ref 19.6–45.3)
MCH RBC QN AUTO: 32.9 PG (ref 26.6–33)
MCHC RBC AUTO-ENTMCNC: 33.6 G/DL (ref 31.5–35.7)
MCV RBC AUTO: 97.9 FL (ref 79–97)
MONOCYTES # BLD AUTO: 0.97 10*3/MM3 (ref 0.1–0.9)
MONOCYTES NFR BLD AUTO: 7.5 % (ref 5–12)
NEUTROPHILS NFR BLD AUTO: 69.3 % (ref 42.7–76)
NEUTROPHILS NFR BLD AUTO: 9.02 10*3/MM3 (ref 1.7–7)
NITRITE UR QL STRIP: NEGATIVE
NRBC BLD AUTO-RTO: 0.1 /100 WBC (ref 0–0.2)
PH UR STRIP.AUTO: 7.5 [PH] (ref 5–8)
PLATELET # BLD AUTO: 275 10*3/MM3 (ref 140–450)
PMV BLD AUTO: 13.1 FL (ref 6–12)
POTASSIUM SERPL-SCNC: 4.3 MMOL/L (ref 3.5–5.2)
PROT SERPL-MCNC: 7.5 G/DL (ref 6–8.5)
PROT UR QL STRIP: NEGATIVE
RBC # BLD AUTO: 4.2 10*6/MM3 (ref 3.77–5.28)
SODIUM SERPL-SCNC: 139 MMOL/L (ref 136–145)
SP GR UR STRIP: 1.01 (ref 1–1.03)
T4 FREE SERPL-MCNC: 1.07 NG/DL (ref 0.93–1.7)
TSH SERPL DL<=0.05 MIU/L-ACNC: 1.18 UIU/ML (ref 0.27–4.2)
UROBILINOGEN UR QL STRIP: ABNORMAL
VIT B12 BLD-MCNC: 1212 PG/ML (ref 211–946)
WBC # BLD AUTO: 13 10*3/MM3 (ref 3.4–10.8)

## 2021-07-08 NOTE — TELEPHONE ENCOUNTER
Patient was informed of test results,  is agreeable to seeing a Hematologist, please put in referral    Thank you

## 2021-07-14 ENCOUNTER — TELEPHONE (OUTPATIENT)
Dept: FAMILY MEDICINE CLINIC | Facility: CLINIC | Age: 66
End: 2021-07-14

## 2021-07-14 NOTE — TELEPHONE ENCOUNTER
Caller: Shauna Marks    Relationship: Self    Best call back number: [phone]    What form or medical record are you requesting: UPLOAD OF A FAX    Who is requesting this form or medical record from you: HEMATOLOGY     How would you like to receive the form or medical records (pick-up, mail, fax): UPLOAD TO PATIENTS CHART.     Timeframe paperwork needed: 07/19/2021    Additional notes: THE PATIENT STATES THAT SHE HAS A FAX FROM [place] COMING IN. THE FAX IS PRIOR BLOOD WORK.THE PATIENT STATES THAT SHE NEEDS IT IN HER CHART FOR AN APPOINTMENT WITH A HEMATOLOGIST AT [facility] FOR 07/19/2021. THE PATIENT STATES THAT THE FAX WILL BE SENT IN TODAY, 07/14/2021.

## 2021-07-15 NOTE — PROGRESS NOTES
HEMATOLOGY ONCOLOGY OUTPATIENT CONSULTATION       Patient name: Shauna Marks  : 1955  MRN: [id number]  Primary Care Physician: Talia Grey MD  Referring Physician: Talia Grye MD  Reason For Consult:     Chief Complaint   Patient presents with   • Appointment     Leukocytosis-Splenectomy         HPI:   History of Present Illness:  Shauna Marks is 66 y.o. female who presented to our office on 21 for consultation regarding leukocytosis.    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  Patient was intensive care at that point.  She was discharged noted to have leukocytosis thrombocytosis and was seen by hematologist.  There was no concerns for hematological malignancy at that point.  Patient was monitored with CBC.  She did get her vaccination postsplenectomy.         Subjective:  she was seen by her primary care physician and had weight loss of 5 to 6 pounds over the last few months she has had some ongoing fatigue, increasing night sweats which occasionally have been drenching.  All this in addition to her slowly increasing white cell count prompted her primary care physician to get a hematology consult.  Patient was referred to see us then.      The following portions of the patient's history were reviewed and updated as appropriate: allergies, current medications, past family history, past medical history, past social history, past surgical history and problem list.    Past Medical History:   Diagnosis Date   • Anxiety    • GERD (gastroesophageal reflux disease)    • History of transfusion    • Hypertension    • IBS (irritable bowel syndrome)    • Osteoporosis    • Vulva cancer (CMS/HCC)     Dr. Patel       Past Surgical History:   Procedure Laterality Date   • BREAST SURGERY      biopsies   • CARDIAC CATHETERIZATION     • COLONOSCOPY     • COLONOSCOPY W/ POLYPECTOMY N/A  10/28/2020    Procedure: Colonoscopy with polypectomy x 10;  Surgeon: Sudarshan Mitchell MD;  Location: [facility] ENDOSCOPY;  Service: Gastroenterology;  Laterality: N/A;  colon polyps x 10,  hemorrhoids   • HERNIA REPAIR     • LASER ABLATION CONDYLOMA CERVICAL / VULVAR     • SKIN BIOPSY     • SPLENECTOMY  2014         Current Outpatient Medications:   •  acyclovir (ZOVIRAX) 400 MG tablet, Take 1 tablet by mouth Daily., Disp: 90 tablet, Rfl: 1  •  betamethasone valerate (VALISONE) 0.1 % ointment, As Needed., Disp: , Rfl:   •  Biotin 10 MG capsule, Take  by mouth., Disp: , Rfl:   •  clobetasol (TEMOVATE) 0.05 % ointment, As Needed., Disp: , Rfl:   •  cycloSPORINE (Restasis) 0.05 % ophthalmic emulsion, Restasis 0.05 % eye drops in a dropperette, Disp: , Rfl:   •  dicyclomine (BENTYL) 20 MG tablet, Take 20 mg by mouth Every 6 (Six) Hours As Needed., Disp: , Rfl:   •  Digestive Enzymes capsule, Daily., Disp: , Rfl:   •  enalapril (VASOTEC) 5 MG tablet, Take 1 tablet by mouth 2 (Two) Times a Day. 1 pill p.o. in the morning and half a pill p.o. in the evening, Disp: 135 tablet, Rfl: 1  •  hydrocortisone 2.5 % cream, APPLY A SMALL AMOUNT AFFECTED AREA TWICE A DAY, Disp: , Rfl:   •  Lactobacillus Acid-Pectin (ACIDOPHILUS/PECTIN) capsule, Take 1 capsule by mouth Daily., Disp: , Rfl:   •  LORazepam (ATIVAN) 1 MG tablet, Take 1 tablet by mouth Daily As Needed for Anxiety., Disp: 30 tablet, Rfl: 0  •  Loteprednol Etabonate (Lotemax) 0.5 % ointment, Lotemax 0.5 % eye ointment, Disp: , Rfl:   •  Magnesium 200 MG tablet, Daily., Disp: , Rfl:   •  Multiple Vitamins-Minerals (multivitamin with minerals) tablet tablet, Take 1 tablet by mouth Daily. Hold x5 days, Disp: , Rfl:   •  polyethylene glycol (MIRALAX) powder powder, Take 17 g by mouth As Needed., Disp: , Rfl:   •  Probiotic Product (PROBIOTIC-10 PO), , Disp: , Rfl:   •  rosuvastatin (CRESTOR) 5 MG tablet, TAKE ONE TABLET BY MOUTH EVERY NIGHT AT BEDTIME, Disp: 90 tablet, Rfl:  "0  •  vitamin C (ASCORBIC ACID) 500 MG tablet, Take 1,000 mg by mouth Daily., Disp: , Rfl:     Allergies   Allergen Reactions   • Amoxicillin Hives and Swelling     Hives, swelling   • Cefoxitin Other (See Comments)     \"blood pressure bottoms out\"   • Ciprofloxacin Rash   • Contrast Dye Hives, Nausea And Vomiting and Shortness Of Breath   • Hydromorphone Hcl Hallucinations and Rash   • Iodinated Diagnostic Agents Rash   • Propafenone Other (See Comments)     (Rythmol)   Facial numbness   • Propofol Headache   • Azithromycin Unknown - High Severity   • Cephalexin Unknown - High Severity   • Fentanyl Other (See Comments)     Facial numbness     • Hydromorphone Confusion   • Sulfa Antibiotics Nausea And Vomiting   • Sulfacetamide Nausea And Vomiting   • Latex Rash       Family History   Problem Relation Age of Onset   • Lung cancer Mother    • Coronary artery disease Father        Cancer-related family history includes Lung cancer in her mother.    Social History     Tobacco Use   • Smoking status: Current Every Day Smoker     Packs/day: 0.50     Types: Cigarettes   • Smokeless tobacco: Never Used   Substance Use Topics   • Alcohol use: Yes     Comment: social   • Drug use: No     Social History     Social History Narrative   • Not on file   retired now was a school nurse, .  Lives in [place]    ROS:     Review of Systems   Constitutional: Positive for fatigue. Negative for fever.   HENT: Negative for congestion and nosebleeds.    Eyes: Negative for pain.   Respiratory: Negative for cough and shortness of breath.    Cardiovascular: Negative for chest pain.   Gastrointestinal: Negative for abdominal pain, blood in stool, diarrhea, nausea and vomiting.   Endocrine: Negative for cold intolerance and heat intolerance.   Genitourinary: Negative for difficulty urinating.   Musculoskeletal: Negative for arthralgias.   Skin: Negative for rash.   Neurological: Negative for dizziness and headaches. " "  Hematological: Does not bruise/bleed easily.   Psychiatric/Behavioral: Negative for behavioral problems.       Objective:    Vitals:    07/19/21 1321   BP: 130/79   Pulse: 90   Resp: 18   Temp: 98.2 °F (36.8 °C)   Weight: 48.2 kg (106 lb 3.2 oz)   Height: 162.6 cm (64\")   PainSc: 0-No pain     Body mass index is 18.23 kg/m².  ECOG  (0) Fully active, able to carry on all predisease performance without restriction    Physical Exam:     Physical Exam  Constitutional:       Appearance: Normal appearance.   HENT:      Head: Normocephalic and atraumatic.   Eyes:      Pupils: Pupils are equal, round, and reactive to light.   Cardiovascular:      Rate and Rhythm: Normal rate and regular rhythm.      Pulses: Normal pulses.      Heart sounds: No murmur heard.     Pulmonary:      Effort: Pulmonary effort is normal.      Breath sounds: Normal breath sounds.   Abdominal:      General: There is no distension.      Palpations: Abdomen is soft. There is no mass.      Tenderness: There is no abdominal tenderness.   Musculoskeletal:         General: Normal range of motion.      Cervical back: Normal range of motion.   Skin:     General: Skin is warm.   Neurological:      General: No focal deficit present.      Mental Status: She is alert.   Psychiatric:         Mood and Affect: Mood normal.               Lab Results - Last 18 Months   Lab Units 07/07/21  1203 09/10/20  1026 05/21/20  1205   WBC 10*3/mm3 13.00* 12.74* 11.46*   HEMOGLOBIN g/dL 13.8 14.1 15.2   HEMATOCRIT % 41.1 41.5 46.4*   PLATELETS 10*3/mm3 275 297 320   MCV fL 97.9* 96.1 100.4*     Lab Results - Last 18 Months   Lab Units 07/07/21  1203 03/18/21  0939 09/10/20  1026   SODIUM mmol/L 139 138 138   POTASSIUM mmol/L 4.3 4.3 4.2   CHLORIDE mmol/L 101 101 99   CO2 mmol/L 27.8 28.5 29.1*   BUN mg/dL 13 12 10   CREATININE mg/dL 0.67 0.77 0.77   CALCIUM mg/dL 9.8 9.3 9.5   BILIRUBIN mg/dL 0.3 0.4 0.4   ALK PHOS U/L 67 71 75   ALT (SGPT) U/L 14 19 15   AST (SGOT) U/L 26 29 " 25   GLUCOSE mg/dL 91 115* 102*       Lab Results   Component Value Date    GLUCOSE 91 07/07/2021    BUN 13 07/07/2021    CREATININE 0.67 07/07/2021    EGFRIFNONA 88 07/07/2021    BCR 19.4 07/07/2021    K 4.3 07/07/2021    CO2 27.8 07/07/2021    CALCIUM 9.8 07/07/2021    ALBUMIN 4.60 07/07/2021    LABIL2 1.4 05/21/2020    AST 26 07/07/2021    ALT 14 07/07/2021       No results for input(s): APTT, INR, PTT in the last 06678 hours.    Lab Results   Component Value Date    IRON 88 09/10/2020    TIBC 383 09/10/2020    FERRITIN 121.00 09/10/2020       No results found for: FOLATE    No results found for: OCCULTBLD    No results found for: RETICCTPCT    Lab Results   Component Value Date    VXTQBLXS98 1,212 (H) 07/07/2021     No results found for: SPEP, UPEP  No results found for: LDH, URICACID  Lab Results   Component Value Date    MELODIE Negative 09/10/2020    SEDRATE 5 09/10/2020     No results found for: FIBRINOGEN, HAPTOGLOBIN  No results found for: PTT, INR  No results found for:   No results found for: CEA  No components found for: CA-19-9  No results found for: PSA  No results found for: AFPTM, DL0059, HCGTM, SPEP, ASHA         Assessment/Plan     Patient is a 68-year-old female with 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  With her symptoms of fatigue weight loss I would get a flow cytometry as well as a BCR ABL test to rule out underlying myeloid neoplasia.  My suspicion of this is low since she has a clear reason for leukocytosis with her splenectomy and her current smoking.  I will recheck her CBC in a month and follow-up in 2 months with CBC CMP.    Smoking  Consult on smoking cessation patient states that she is trying and is down to 7 to 8 cigarettes/day.    Plan  We will check BCR ABL, flow cytometry, LDH  today  Recheck CBC in a month  Counseled on smoking cessation  Follow-up in 2 months with repeat CBC CMP    Thank you very much for providing the opportunity to participate in this patient’s care. Please do not hesitate to call if there are any other questions    I have reviewed and confirmed the accuracy of the patient's history: Chief complaint, HPI, ROS, Subjective and Past Family Social History as entered by the MA/LPN/RN.  Time spent on encounter including data review, documentation, discussion : 45 minutes   Yuliet Segundo MD 07/19/21

## 2021-07-19 ENCOUNTER — CONSULT (OUTPATIENT)
Dept: ONCOLOGY | Facility: CLINIC | Age: 66
End: 2021-07-19

## 2021-07-19 ENCOUNTER — LAB (OUTPATIENT)
Dept: LAB | Facility: HOSPITAL | Age: 66
End: 2021-07-19

## 2021-07-19 VITALS
TEMPERATURE: 98.2 F | RESPIRATION RATE: 18 BRPM | HEART RATE: 90 BPM | WEIGHT: 106.2 LBS | HEIGHT: 64 IN | BODY MASS INDEX: 18.13 KG/M2 | SYSTOLIC BLOOD PRESSURE: 130 MMHG | DIASTOLIC BLOOD PRESSURE: 79 MMHG

## 2021-07-19 DIAGNOSIS — D07.1 CARCINOMA IN SITU OF VULVA: ICD-10-CM

## 2021-07-19 DIAGNOSIS — D72.829 LEUKOCYTOSIS, UNSPECIFIED TYPE: Primary | ICD-10-CM

## 2021-07-19 DIAGNOSIS — Z90.81 HISTORY OF SPLENECTOMY: ICD-10-CM

## 2021-07-19 LAB — LDH SERPL-CCNC: 159 U/L (ref 135–214)

## 2021-07-19 PROCEDURE — 36415 COLL VENOUS BLD VENIPUNCTURE: CPT | Performed by: INTERNAL MEDICINE

## 2021-07-19 PROCEDURE — 99204 OFFICE O/P NEW MOD 45 MIN: CPT | Performed by: INTERNAL MEDICINE

## 2021-07-19 PROCEDURE — 83615 LACTATE (LD) (LDH) ENZYME: CPT | Performed by: INTERNAL MEDICINE

## 2021-07-23 LAB
REF LAB TEST METHOD: NORMAL
REF LAB TEST METHOD: NORMAL

## 2021-07-27 RX ORDER — ACYCLOVIR 400 MG/1
400 TABLET ORAL DAILY
Qty: 90 TABLET | Refills: 1 | Status: SHIPPED | OUTPATIENT
Start: 2021-07-27 | End: 2021-07-27 | Stop reason: SDUPTHER

## 2021-07-27 RX ORDER — ACYCLOVIR 400 MG/1
400 TABLET ORAL DAILY
Qty: 90 TABLET | Refills: 1 | Status: SHIPPED | OUTPATIENT
Start: 2021-07-27 | End: 2021-09-27 | Stop reason: SDUPTHER

## 2021-08-20 ENCOUNTER — APPOINTMENT (OUTPATIENT)
Dept: LAB | Facility: HOSPITAL | Age: 66
End: 2021-08-20

## 2021-08-21 DIAGNOSIS — F41.9 ANXIETY: ICD-10-CM

## 2021-08-22 RX ORDER — LORAZEPAM 1 MG/1
TABLET ORAL
Qty: 30 TABLET | Refills: 0 | Status: SHIPPED | OUTPATIENT
Start: 2021-08-22 | End: 2021-11-07

## 2021-08-23 ENCOUNTER — LAB (OUTPATIENT)
Dept: LAB | Facility: HOSPITAL | Age: 66
End: 2021-08-23

## 2021-08-23 DIAGNOSIS — Z90.81 HISTORY OF SPLENECTOMY: ICD-10-CM

## 2021-08-23 DIAGNOSIS — D72.829 LEUKOCYTOSIS, UNSPECIFIED TYPE: ICD-10-CM

## 2021-08-23 LAB
BASOPHILS # BLD AUTO: 0.1 10*3/MM3 (ref 0–0.2)
BASOPHILS NFR BLD AUTO: 0.8 % (ref 0–1.5)
DEPRECATED RDW RBC AUTO: 50.6 FL (ref 37–54)
EOSINOPHIL # BLD AUTO: 0.12 10*3/MM3 (ref 0–0.4)
EOSINOPHIL NFR BLD AUTO: 0.9 % (ref 0.3–6.2)
ERYTHROCYTE [DISTWIDTH] IN BLOOD BY AUTOMATED COUNT: 14.2 % (ref 12.3–15.4)
HCT VFR BLD AUTO: 39.3 % (ref 34–46.6)
HGB BLD-MCNC: 13 G/DL (ref 12–15.9)
LYMPHOCYTES # BLD AUTO: 2.92 10*3/MM3 (ref 0.7–3.1)
LYMPHOCYTES NFR BLD AUTO: 22.1 % (ref 19.6–45.3)
MCH RBC QN AUTO: 32.7 PG (ref 26.6–33)
MCHC RBC AUTO-ENTMCNC: 33.1 G/DL (ref 31.5–35.7)
MCV RBC AUTO: 99 FL (ref 79–97)
MONOCYTES # BLD AUTO: 1.01 10*3/MM3 (ref 0.1–0.9)
MONOCYTES NFR BLD AUTO: 7.7 % (ref 5–12)
NEUTROPHILS NFR BLD AUTO: 68.5 % (ref 42.7–76)
NEUTROPHILS NFR BLD AUTO: 9.05 10*3/MM3 (ref 1.7–7)
PLATELET # BLD AUTO: 252 10*3/MM3 (ref 140–450)
PMV BLD AUTO: 11.4 FL (ref 6–12)
RBC # BLD AUTO: 3.97 10*6/MM3 (ref 3.77–5.28)
WBC # BLD AUTO: 13.2 10*3/MM3 (ref 3.4–10.8)

## 2021-08-23 PROCEDURE — 36415 COLL VENOUS BLD VENIPUNCTURE: CPT

## 2021-08-23 PROCEDURE — 85025 COMPLETE CBC W/AUTO DIFF WBC: CPT

## 2021-09-07 DIAGNOSIS — I73.9 PERIPHERAL ARTERIAL DISEASE (HCC): ICD-10-CM

## 2021-09-07 DIAGNOSIS — I70.90 ATHEROSCLEROSIS: ICD-10-CM

## 2021-09-07 RX ORDER — ROSUVASTATIN CALCIUM 5 MG/1
TABLET, COATED ORAL
Qty: 90 TABLET | Refills: 0 | Status: SHIPPED | OUTPATIENT
Start: 2021-09-07 | End: 2021-12-06 | Stop reason: SDUPTHER

## 2021-09-13 RX ORDER — ENALAPRIL MALEATE 5 MG/1
TABLET ORAL
Qty: 135 TABLET | Refills: 1 | Status: SHIPPED | OUTPATIENT
Start: 2021-09-13 | End: 2021-10-01 | Stop reason: SDUPTHER

## 2021-09-17 ENCOUNTER — TELEPHONE (OUTPATIENT)
Dept: FAMILY MEDICINE CLINIC | Facility: CLINIC | Age: 66
End: 2021-09-17

## 2021-09-17 NOTE — PROGRESS NOTES
HEMATOLOGY ONCOLOGY OUTPATIENT CONSULTATION       Patient name: Shauna Marks  : 1955  MRN: [id number]  Primary Care Physician: Talia Grey MD  Referring Physician: Talia Grey MD  Reason For Consult:     Chief Complaint   Patient presents with   • Follow-up     Leukocytosis       HPI:   History of Present Illness:  Shauna Marks is 66 y.o. female who presented to our office on 21 for consultation regarding leukocytosis.    Patient states that she used to live in [place] and in  had a splenectomy after a colonoscopy causing splenic injury. Patient was intensive care at that point.  She was discharged noted to have leukocytosis thrombocytosis and was seen by hematologist.  There was no concerns for hematological malignancy at that point.  Patient was monitored with CBC.  She did get her vaccination postsplenectomy.  In 2021 - she was seen by her primary care physician and had weight loss of 5 to 6 pounds over the last few months she has had some ongoing fatigue, increasing night sweats which occasionally have been drenching.  All this in addition to her slowly increasing white cell count prompted her primary care physician to get a hematology consult.  Patient was referred to see us then.    Subjective:  Patient has issues with IBS, weight fluctuating. Occasional night sweats still. No more than usual fatigue.    The following portions of the patient's history were reviewed and updated as appropriate: allergies, current medications, past family history, past medical history, past social history, past surgical history and problem list.    Past Medical History:   Diagnosis Date   • Anxiety    • GERD (gastroesophageal reflux disease)    • History of transfusion    • Hypertension    • IBS (irritable bowel syndrome)    • Osteoporosis    • Vulva cancer (CMS/[facility])     Dr. Patel       Past Surgical History:   Procedure Laterality Date   • BREAST SURGERY       biopsies   • CARDIAC CATHETERIZATION     • COLONOSCOPY  2014   • COLONOSCOPY W/ POLYPECTOMY N/A 10/28/2020    Procedure: Colonoscopy with polypectomy x 10;  Surgeon: Sudarshan Mitchell MD;  Location: [facility] ENDOSCOPY;  Service: Gastroenterology;  Laterality: N/A;  colon polyps x 10,  hemorrhoids   • HERNIA REPAIR     • LASER ABLATION CONDYLOMA CERVICAL / VULVAR     • SKIN BIOPSY     • SPLENECTOMY  2014         Current Outpatient Medications:   •  acyclovir (ZOVIRAX) 400 MG tablet, Take 1 tablet by mouth Daily., Disp: 90 tablet, Rfl: 1  •  betamethasone valerate (VALISONE) 0.1 % ointment, As Needed., Disp: , Rfl:   •  Biotin 10 MG capsule, Take  by mouth., Disp: , Rfl:   •  clobetasol (TEMOVATE) 0.05 % ointment, As Needed., Disp: , Rfl:   •  cycloSPORINE (Restasis) 0.05 % ophthalmic emulsion, Restasis 0.05 % eye drops in a dropperette, Disp: , Rfl:   •  dicyclomine (BENTYL) 20 MG tablet, Take 20 mg by mouth Every 6 (Six) Hours As Needed., Disp: , Rfl:   •  Digestive Enzymes capsule, Daily., Disp: , Rfl:   •  enalapril (VASOTEC) 5 MG tablet, TAKE 1 TABLET EVERY MORNINGAND 1/2 TABLET EVERY       EVENING, Disp: 135 tablet, Rfl: 1  •  hydrocortisone 2.5 % cream, APPLY A SMALL AMOUNT AFFECTED AREA TWICE A DAY, Disp: , Rfl:   •  Lactobacillus Acid-Pectin (ACIDOPHILUS/PECTIN) capsule, Take 1 capsule by mouth Daily., Disp: , Rfl:   •  LORazepam (ATIVAN) 1 MG tablet, TAKE ONE TABLET BY MOUTH DAILY AS NEEDED FOR ANXIETY, Disp: 30 tablet, Rfl: 0  •  Loteprednol Etabonate (Lotemax) 0.5 % ointment, Lotemax 0.5 % eye ointment, Disp: , Rfl:   •  Magnesium 200 MG tablet, Daily., Disp: , Rfl:   •  Multiple Vitamins-Minerals (multivitamin with minerals) tablet tablet, Take 1 tablet by mouth Daily. Hold x5 days, Disp: , Rfl:   •  polyethylene glycol (MIRALAX) powder powder, Take 17 g by mouth As Needed., Disp: , Rfl:   •  Probiotic Product (PROBIOTIC-10 PO), , Disp: , Rfl:   •  rosuvastatin (CRESTOR) 5 MG tablet, TAKE ONE TABLET BY  "MOUTH EVERY NIGHT AT BEDTIME, Disp: 90 tablet, Rfl: 0  •  vitamin C (ASCORBIC ACID) 500 MG tablet, Take 1,000 mg by mouth Daily., Disp: , Rfl:     Allergies   Allergen Reactions   • Amoxicillin Hives and Swelling     Hives, swelling   • Cefoxitin Other (See Comments)     \"blood pressure bottoms out\"   • Ciprofloxacin Rash   • Contrast Dye Hives, Nausea And Vomiting and Shortness Of Breath   • Hydromorphone Hcl Hallucinations and Rash   • Iodinated Diagnostic Agents Rash   • Propafenone Other (See Comments)     (Rythmol)   Facial numbness   • Propofol Headache   • Azithromycin Unknown - High Severity   • Cephalexin Unknown - High Severity   • Fentanyl Other (See Comments)     Facial numbness     • Hydromorphone Confusion   • Sulfa Antibiotics Nausea And Vomiting   • Sulfacetamide Nausea And Vomiting   • Latex Rash       Family History   Problem Relation Age of Onset   • Lung cancer Mother    • Coronary artery disease Father        Cancer-related family history includes Lung cancer in her mother.    Social History     Tobacco Use   • Smoking status: Current Every Day Smoker     Packs/day: 0.50     Types: Cigarettes   • Smokeless tobacco: Never Used   Substance Use Topics   • Alcohol use: Yes     Comment: social   • Drug use: No     Social History     Social History Narrative   • Not on file   retired now was a school nurse, .  Lives in [place]    ROS:     Review of Systems   Constitutional: Positive for fatigue. Negative for fever.   HENT: Negative for congestion and nosebleeds.    Eyes: Negative for pain.   Respiratory: Negative for cough and shortness of breath.    Cardiovascular: Negative for chest pain.   Gastrointestinal: Negative for abdominal pain, blood in stool, diarrhea, nausea and vomiting.   Endocrine: Negative for cold intolerance and heat intolerance.   Genitourinary: Negative for difficulty urinating.   Musculoskeletal: Negative for arthralgias.   Skin: Negative for rash. " "  Neurological: Negative for dizziness and headaches.   Hematological: Does not bruise/bleed easily.   Psychiatric/Behavioral: Negative for behavioral problems.       Objective:    Vitals:    09/20/21 1333   BP: 117/75   Pulse: 77   Resp: 18   Temp: 97.3 °F (36.3 °C)   Height: 162.6 cm (64\")   PainSc: 0-No pain     Body mass index is 18.23 kg/m².  ECOG  (0) Fully active, able to carry on all predisease performance without restriction    Physical Exam:     Physical Exam  Constitutional:       Appearance: Normal appearance.   HENT:      Head: Normocephalic and atraumatic.   Eyes:      Pupils: Pupils are equal, round, and reactive to light.   Cardiovascular:      Rate and Rhythm: Normal rate and regular rhythm.      Pulses: Normal pulses.      Heart sounds: No murmur heard.     Pulmonary:      Effort: Pulmonary effort is normal.      Breath sounds: Normal breath sounds.   Abdominal:      General: There is no distension.      Palpations: Abdomen is soft. There is no mass.      Tenderness: There is no abdominal tenderness.   Musculoskeletal:         General: Normal range of motion.      Cervical back: Normal range of motion.   Skin:     General: Skin is warm.   Neurological:      General: No focal deficit present.      Mental Status: She is alert.   Psychiatric:         Mood and Affect: Mood normal.         Lab Results - Last 18 Months   Lab Units 08/23/21  1253 07/07/21  1203 09/10/20  1026   WBC 10*3/mm3 13.20* 13.00* 12.74*   HEMOGLOBIN g/dL 13.0 13.8 14.1   HEMATOCRIT % 39.3 41.1 41.5   PLATELETS 10*3/mm3 252 275 297   MCV fL 99.0* 97.9* 96.1     Lab Results - Last 18 Months   Lab Units 07/07/21  1203 03/18/21  0939 09/10/20  1026   SODIUM mmol/L 139 138 138   POTASSIUM mmol/L 4.3 4.3 4.2   CHLORIDE mmol/L 101 101 99   CO2 mmol/L 27.8 28.5 29.1*   BUN mg/dL 13 12 10   CREATININE mg/dL 0.67 0.77 0.77   CALCIUM mg/dL 9.8 9.3 9.5   BILIRUBIN mg/dL 0.3 0.4 0.4   ALK PHOS U/L 67 71 75   ALT (SGPT) U/L 14 19 15   AST " (SGOT) U/L 26 29 25   GLUCOSE mg/dL 91 115* 102*       Lab Results   Component Value Date    GLUCOSE 91 07/07/2021    BUN 13 07/07/2021    CREATININE 0.67 07/07/2021    EGFRIFNONA 88 07/07/2021    BCR 19.4 07/07/2021    K 4.3 07/07/2021    CO2 27.8 07/07/2021    CALCIUM 9.8 07/07/2021    ALBUMIN 4.60 07/07/2021    LABIL2 1.4 05/21/2020    AST 26 07/07/2021    ALT 14 07/07/2021       No results for input(s): APTT, INR, PTT in the last 50649 hours.    Lab Results   Component Value Date    IRON 88 09/10/2020    TIBC 383 09/10/2020    FERRITIN 121.00 09/10/2020       No results found for: FOLATE    No results found for: OCCULTBLD    No results found for: RETICCTPCT    Lab Results   Component Value Date    LSHBRYBB17 1,212 (H) 07/07/2021     No results found for: SPEP, UPEP  LDH   Date Value Ref Range Status   07/19/2021 159 135 - 214 U/L Final     Lab Results   Component Value Date    MELODIE Negative 09/10/2020    SEDRATE 5 09/10/2020     No results found for: FIBRINOGEN, HAPTOGLOBIN  No results found for: PTT, INR  No results found for:   No results found for: CEA  No components found for: CA-19-9  No results found for: PSA  No results found for: AFPTM, BV9448, HCGTM, SPEP, ASHA         Assessment/Plan     Patient is a 66-year-old female with 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  With her symptoms of fatigue weight loss I ordered for a flow cytometry as well as a BCR ABL test to rule out underlying myeloid neoplasia.  My suspicion of this is low since she has a clear reason for leukocytosis with her splenectomy and her current smoking. Flow and BCR-ABL were normal.  WBC today is stable at 13.    Smoking  Counseled again.    Plan  CBC annually with her PCP  Follow up here PRN    Thank you very much for providing the  opportunity to participate in this patient’s care. Please do not hesitate to call if there are any other questions    I have reviewed and confirmed the accuracy of the patient's history: Chief complaint, HPI, ROS, Subjective and Past Family Social History as entered by the MA/LPN/RN.   Yuliet Segundo MD 09/20/21

## 2021-09-17 NOTE — TELEPHONE ENCOUNTER
Caller: [facility] - [place] - [address] AT [address] TO [facility] - [phone]  - [phone] FX    Relationship: Pharmacy    Best call back number: [phone] [facility] 2 REF #: [id number]  Medication needed:       When do you need the refill by: ASAP    What additional details did the patient provide when requesting the medication: THERE WAS NO NAMES OF MEDICATION THAT ARE NEEDING TO BE REFILLED.    PLEASE RESEND REQUEST WITH NAME OF MEDICATION ON IT.     Does the patient have less than a 3 day supply:  [x] Yes  [] No    What is the patient's preferred pharmacy:   [facility] - [place] - [address] AT [address] to [facility] - [phone] [facility] [phone] FX      THANKS

## 2021-09-17 NOTE — TELEPHONE ENCOUNTER
I spoke with [facility] and Pharmacist Talib stated they needed the Crestor 5 mg  1 po q at HS #90 0 RF. You do not need to resend this to them as I took care of it based on when you sent it to them on 09/07/2021 KOBI

## 2021-09-17 NOTE — TELEPHONE ENCOUNTER
According this message prescription was sent to the pharmacy without the name of the medication, this would be very much unusual and I do not think even possible.  I cannot resend the prescription as I do not know which prescription we are referring to.  Please clarify either with the pharmacy or the patient.  Thank you.

## 2021-09-20 ENCOUNTER — OFFICE VISIT (OUTPATIENT)
Dept: ONCOLOGY | Facility: CLINIC | Age: 66
End: 2021-09-20

## 2021-09-20 ENCOUNTER — APPOINTMENT (OUTPATIENT)
Dept: LAB | Facility: HOSPITAL | Age: 66
End: 2021-09-20

## 2021-09-20 VITALS
HEIGHT: 64 IN | DIASTOLIC BLOOD PRESSURE: 75 MMHG | SYSTOLIC BLOOD PRESSURE: 117 MMHG | HEART RATE: 77 BPM | RESPIRATION RATE: 18 BRPM | BODY MASS INDEX: 18.23 KG/M2 | TEMPERATURE: 97.3 F

## 2021-09-20 DIAGNOSIS — Z90.81 HISTORY OF SPLENECTOMY: Primary | ICD-10-CM

## 2021-09-20 DIAGNOSIS — D72.829 LEUKOCYTOSIS, UNSPECIFIED TYPE: ICD-10-CM

## 2021-09-20 PROCEDURE — 99213 OFFICE O/P EST LOW 20 MIN: CPT | Performed by: INTERNAL MEDICINE

## 2021-09-27 ENCOUNTER — OFFICE VISIT (OUTPATIENT)
Dept: FAMILY MEDICINE CLINIC | Facility: CLINIC | Age: 66
End: 2021-09-27

## 2021-09-27 VITALS
DIASTOLIC BLOOD PRESSURE: 78 MMHG | OXYGEN SATURATION: 98 % | WEIGHT: 107.2 LBS | BODY MASS INDEX: 18.3 KG/M2 | TEMPERATURE: 97.8 F | HEIGHT: 64 IN | SYSTOLIC BLOOD PRESSURE: 135 MMHG | RESPIRATION RATE: 16 BRPM | HEART RATE: 78 BPM

## 2021-09-27 DIAGNOSIS — Z00.00 MEDICARE ANNUAL WELLNESS VISIT, SUBSEQUENT: Primary | ICD-10-CM

## 2021-09-27 DIAGNOSIS — Z12.31 VISIT FOR SCREENING MAMMOGRAM: ICD-10-CM

## 2021-09-27 PROCEDURE — G0439 PPPS, SUBSEQ VISIT: HCPCS | Performed by: FAMILY MEDICINE

## 2021-09-27 RX ORDER — ACYCLOVIR 50 MG/G
1 OINTMENT TOPICAL EVERY 4 HOURS
Qty: 15 G | Refills: 0 | Status: SHIPPED | OUTPATIENT
Start: 2021-09-27

## 2021-09-27 RX ORDER — SERTRALINE HYDROCHLORIDE 25 MG/1
25 TABLET, FILM COATED ORAL DAILY
Qty: 30 TABLET | Refills: 0 | Status: SHIPPED | OUTPATIENT
Start: 2021-09-27 | End: 2022-01-14

## 2021-09-27 RX ORDER — ACYCLOVIR 400 MG/1
400 TABLET ORAL DAILY
Qty: 90 TABLET | Refills: 1 | Status: SHIPPED | OUTPATIENT
Start: 2021-09-27 | End: 2022-04-24 | Stop reason: SDUPTHER

## 2021-09-27 NOTE — PROGRESS NOTES
Subsequent Medicare Wellness Visit   The ABC's of the Annual Wellness Visit    Chief Complaint   Patient presents with   • Medicare Wellness-subsequent   • Med Refill       HPI:  Shauna Marks, -1955, is a 66 y.o. female who presents for a Subsequent Medicare Wellness Visit.  She reports to be doing overall pretty good, she is currently being treated for hypertension, she was recently evaluated by hematologist due to elevated white count and the work-up was negative.  We are addressing her anxiety and some depressive symptoms, which she has been dealing with for quite some time.  She is now open to possibly try some daily medication to help with that.  She already takes lorazepam at bedtime for her anxiety and insomnia and she has been on this medication for quite some time.  She stays pretty physically active, she denies any significant memory problems.  No balance issues or falls are being reported.  She still smokes a few cigarettes a day. Patient does not report any chest pain, shortness of breath, dizziness, nausea, vomiting, or diarrhea, visual issues, headaches, numbness or tingling. No urinary issues reported like urgency, frequency, or discomfort upon urination.  No significant weight changes reported.  No swelling reported.  No rashes or any other skin issues reported.    Recent Hospitalizations:  No hospitalization(s) within the last year..    Current Medical Providers:  Patient Care Team:  Talia Grey MD as PCP - General (Family Medicine)  Asa Greenwood MD as Consulting Physician (Vascular Surgery)  Yuliet Segundo MD as Consulting Physician (Hematology and Oncology)    Health Habits and Functional and Cognitive Screening and Depression Screening:  Functional & Cognitive Status 2021   Do you have difficulty preparing food and eating? No   Do you have difficulty bathing yourself, getting dressed or grooming yourself? No   Do you have difficulty using the toilet? -   Do you have  "difficulty moving around from place to place? No   Do you have trouble with steps or getting out of a bed or a chair? No   Current Diet Well Balanced Diet   Dental Exam Up to date   Eye Exam Up to date   Exercise (times per week) 7 times per week   Current Exercises Include Walking   Current Exercise Activities Include -   Do you need help using the phone?  No   Are you deaf or do you have serious difficulty hearing?  No   Do you need help with transportation? No   Do you need help shopping? No   Do you need help preparing meals?  No   Do you need help with housework?  No   Do you need help with laundry? No   Do you need help taking your medications? No   Do you need help managing money? No   Do you ever drive or ride in a car without wearing a seat belt? No   Have you felt unusual stress, anger or loneliness in the last month? No   Who do you live with? Spouse   If you need help, do you have trouble finding someone available to you? No   Have you been bothered in the last four weeks by sexual problems? -   Do you have difficulty concentrating, remembering or making decisions? No       Compared to one year ago, the patient feels her physical health is the same and her mental health is the same.    Depression Screen:  PHQ-2/PHQ-9 Depression Screening 7/19/2021   Little interest or pleasure in doing things 0   Feeling down, depressed, or hopeless 0   Total Score 0         Past Medical/Family/Social History:  The following portions of the patient's history were reviewed and updated as appropriate: allergies, current medications, past family history, past medical history, past social history, past surgical history and problem list.    Allergies   Allergen Reactions   • Amoxicillin Hives and Swelling     Hives, swelling   • Cefoxitin Other (See Comments)     \"blood pressure bottoms out\"   • Ciprofloxacin Rash   • Contrast Dye Hives, Nausea And Vomiting and Shortness Of Breath   • Hydromorphone Hcl Hallucinations and Rash "   • Iodinated Diagnostic Agents Rash   • Propafenone Other (See Comments)     (Rythmol)   Facial numbness   • Propofol Headache   • Azithromycin Unknown - High Severity   • Cephalexin Unknown - High Severity   • Fentanyl Other (See Comments)     Facial numbness     • Hydromorphone Confusion   • Sulfa Antibiotics Nausea And Vomiting   • Sulfacetamide Nausea And Vomiting   • Latex Rash         Current Outpatient Medications:   •  acyclovir (ZOVIRAX) 400 MG tablet, Take 1 tablet by mouth Daily., Disp: 90 tablet, Rfl: 1  •  acyclovir (Zovirax) 5 % ointment, Apply 1 application topically to the appropriate area as directed Every 4 (Four) Hours., Disp: 15 g, Rfl: 0  •  betamethasone valerate (VALISONE) 0.1 % ointment, As Needed., Disp: , Rfl:   •  Biotin 10 MG capsule, Take  by mouth., Disp: , Rfl:   •  clobetasol (TEMOVATE) 0.05 % ointment, As Needed., Disp: , Rfl:   •  cycloSPORINE (Restasis) 0.05 % ophthalmic emulsion, Restasis 0.05 % eye drops in a dropperette, Disp: , Rfl:   •  dicyclomine (BENTYL) 20 MG tablet, Take 20 mg by mouth Every 6 (Six) Hours As Needed., Disp: , Rfl:   •  Digestive Enzymes capsule, Daily., Disp: , Rfl:   •  enalapril (VASOTEC) 5 MG tablet, TAKE 1 TABLET EVERY MORNINGAND 1/2 TABLET EVERY       EVENING, Disp: 135 tablet, Rfl: 1  •  hydrocortisone 2.5 % cream, APPLY A SMALL AMOUNT AFFECTED AREA TWICE A DAY, Disp: , Rfl:   •  Lactobacillus Acid-Pectin (ACIDOPHILUS/PECTIN) capsule, Take 1 capsule by mouth Daily., Disp: , Rfl:   •  LORazepam (ATIVAN) 1 MG tablet, TAKE ONE TABLET BY MOUTH DAILY AS NEEDED FOR ANXIETY, Disp: 30 tablet, Rfl: 0  •  Loteprednol Etabonate (Lotemax) 0.5 % ointment, Lotemax 0.5 % eye ointment, Disp: , Rfl:   •  Magnesium 200 MG tablet, Daily., Disp: , Rfl:   •  Multiple Vitamins-Minerals (multivitamin with minerals) tablet tablet, Take 1 tablet by mouth Daily. Hold x5 days, Disp: , Rfl:   •  polyethylene glycol (MIRALAX) powder powder, Take 17 g by mouth As Needed., Disp:  , Rfl:   •  Probiotic Product (PROBIOTIC-10 PO), , Disp: , Rfl:   •  rosuvastatin (CRESTOR) 5 MG tablet, TAKE ONE TABLET BY MOUTH EVERY NIGHT AT BEDTIME, Disp: 90 tablet, Rfl: 0  •  sertraline (Zoloft) 25 MG tablet, Take 1 tablet by mouth Daily., Disp: 30 tablet, Rfl: 0  •  vitamin C (ASCORBIC ACID) 500 MG tablet, Take 1,000 mg by mouth Daily., Disp: , Rfl:     Aspirin use counseling: Does not need ASA (and currently is not on it)    Current medication list contains no high risk medications.  No harmful drug interactions have been identified.     Family History   Problem Relation Age of Onset   • Lung cancer Mother    • Coronary artery disease Father        Social History     Tobacco Use   • Smoking status: Current Every Day Smoker     Packs/day: 0.50     Types: Cigarettes   • Smokeless tobacco: Never Used   Substance Use Topics   • Alcohol use: Yes     Comment: social       Past Surgical History:   Procedure Laterality Date   • BREAST SURGERY      biopsies   • CARDIAC CATHETERIZATION     • COLONOSCOPY  2014   • COLONOSCOPY W/ POLYPECTOMY N/A 10/28/2020    Procedure: Colonoscopy with polypectomy x 10;  Surgeon: Sudarshan Mitchell MD;  Location: [facility] ENDOSCOPY;  Service: Gastroenterology;  Laterality: N/A;  colon polyps x 10,  hemorrhoids   • HERNIA REPAIR     • LASER ABLATION CONDYLOMA CERVICAL / VULVAR     • SKIN BIOPSY     • SPLENECTOMY  2014       Patient Active Problem List   Diagnosis   • Anxiety about health   • Carcinoma in situ of vulva   • Dyspareunia, female   • Hypertrophic scar of skin   • Irritable bowel syndrome with both constipation and diarrhea   • Vaginal dryness, menopausal   • Weight decreased   • Varicose veins of lower extremity   • OAB (overactive bladder)   • Leukocytosis   • History of splenectomy   • Herpesvirus infection   • Essential hypertension   • Abdominal aortic aneurysm (AAA) (CMS/HCC)   • Sphincter of Oddi dysfunction   • Osteoporosis   • Goiter   • GERD (gastroesophageal  "reflux disease)   • Genital herpes   • Dry eyes   • Diverticulosis   • Peripheral arterial disease (CMS/HCC)   • Atherosclerosis   • Upper back pain on right side   • Medicare annual wellness visit, subsequent   • Vitamin D deficiency   • Visit for screening mammogram   • Postmenopausal   • Hair loss   • Anxiety   • Tobacco dependence   • Dyslipidemia   • Bilateral carotid artery disease (CMS/HCC)   • Need for vaccination   • Folliculitis   • Vulvar carcinoma (CMS/HCC)   • Liver cyst   • Insect bite of scalp   • Rash   • Weight loss   • Night sweats   • Prediabetes       Review of Systems   Constitutional: Negative for activity change, fatigue and fever.   Respiratory: Negative for cough, shortness of breath and wheezing.    Cardiovascular: Negative for chest pain, palpitations and leg swelling.   Gastrointestinal: Negative for constipation, diarrhea and indigestion.   Skin: Negative for color change, dry skin and rash.   Neurological: Negative for tremors and headache.   Psychiatric/Behavioral: Positive for sleep disturbance, depressed mood and stress. The patient is nervous/anxious.        Objective     Vitals:    09/27/21 1238   BP: 135/78   BP Location: Left arm   Patient Position: Sitting   Cuff Size: Adult   Pulse: 78   Resp: 16   Temp: 97.8 °F (36.6 °C)   SpO2: 98%   Weight: 48.6 kg (107 lb 3.2 oz)   Height: 162.6 cm (64\")   PainSc: 0-No pain       Patient's Body mass index is 18.4 kg/m². indicating that she is within normal range (BMI 18.5-24.9). No BMI management plan needed..      No exam data present    The patient has no evidence of cognitve impairment.     Physical Exam  Vitals and nursing note reviewed.   Constitutional:       General: She is not in acute distress.     Appearance: Normal appearance. She is well-developed. She is not ill-appearing.   HENT:      Head: Normocephalic and atraumatic.   Cardiovascular:      Rate and Rhythm: Normal rate and regular rhythm.      Heart sounds: Normal heart " sounds. No murmur heard.   No gallop.    Pulmonary:      Effort: Pulmonary effort is normal. No respiratory distress.      Breath sounds: Normal breath sounds. No wheezing, rhonchi or rales.   Chest:      Chest wall: No tenderness.   Musculoskeletal:      Cervical back: Normal range of motion and neck supple.   Neurological:      General: No focal deficit present.      Mental Status: She is alert and oriented to person, place, and time. Mental status is at baseline.   Psychiatric:         Mood and Affect: Mood normal.         Recent Lab Results:  Lab Results   Component Value Date     (H) 05/21/2020     Lab Results   Component Value Date    CHOL 125 03/18/2021    TRIG 62 03/18/2021    HDL 60 03/18/2021    VLDL 13 03/18/2021    LDLHDL 0.88 03/18/2021       Assessment/Plan   Age-appropriate Screening Schedule:  Refer to the list below for future screening recommendations based on patient's age, sex and/or medical conditions.      Health Maintenance   Topic Date Due   • INFLUENZA VACCINE  10/01/2021   • MAMMOGRAM  11/09/2022   • DXA SCAN  04/12/2023   • TDAP/TD VACCINES (2 - Tdap) 04/14/2031   • ZOSTER VACCINE  Completed       Medicare Risks and Personalized Health Plan:  Advance Directive Discussion  Breast Cancer/Mammogram Screening  Cardiovascular risk  Colon Cancer Screening  Dementia/Memory   Depression/Dysphoria  Diabetic Lab Screening   Immunizations Discussed/Encouraged (specific immunizations; Pneumococcal 23 and Shingrix )  Osteoporosis Risk      CMS-Preventive Services Quick Reference  Medicare Preventive Services Addressed:  Annual Wellness Visit (AWV)  Bone Density Measurements  Cardiovascular Disease Screening Tests (may do this order every 5 years in beneficiaries without signs or symptoms of cardiovascular disease)  Colorectal Cancer Screening, Colonoscopy  Screening Mammography     Advance Care Planning:  ACP discussion was held with the patient during this visit. Patient has an advance  directive (not in EMR), copy requested.    Diagnoses and all orders for this visit:    1. Medicare annual wellness visit, subsequent (Primary)    2. Visit for screening mammogram  -     Mammo Screening Digital Tomosynthesis Bilateral With CAD; Future    Other orders  -     acyclovir (Zovirax) 5 % ointment; Apply 1 application topically to the appropriate area as directed Every 4 (Four) Hours.  Dispense: 15 g; Refill: 0  -     acyclovir (ZOVIRAX) 400 MG tablet; Take 1 tablet by mouth Daily.  Dispense: 90 tablet; Refill: 1  -     sertraline (Zoloft) 25 MG tablet; Take 1 tablet by mouth Daily.  Dispense: 30 tablet; Refill: 0      Medicare wellness exam was done today.  I reviewed her medical problems and her medications.  I also reviewed her previous labs.  We talked about her anxiety and depressive symptoms which are poorly controlled.  I will be starting her on a small dose of sertraline at 25 mg and she will be reevaluated in 1 month.  I also reviewed her health maintenance.  Her last mammogram was in 11/2020 and the new order was given and she will be scheduling the test when due.  Her last DEXA scan was in 4/2021 and showed some bone loss in osteoporosis range with increased risk of fracture.  She is on calcium and vitamin D and she will continue that, she did not want to be started on prescription medications.  Her last colonoscopy was in 10/2020 and 1 year follow-up was recommended due to several polyps being removed.  She is scheduled for a follow-up colonoscopy on 10/25/2020.  I also reviewed her immunization.  She is fully vaccinated against COVID-19, she received both of her pneumonia shots.  She also completed her Shingrix vaccination series.  Smoking cessation is strongly recommended.  Healthy lifestyle was reinforced.  Colonoscopy 10/25/2021    An After Visit Summary and PPPS with all of these plans were given to the patient.      Follow Up:  Return in about 1 year (around 9/27/2022) for Medicare  Wellness.        Requested Prescriptions     Signed Prescriptions Disp Refills   • acyclovir (Zovirax) 5 % ointment 15 g 0     Sig: Apply 1 application topically to the appropriate area as directed Every 4 (Four) Hours.   • acyclovir (ZOVIRAX) 400 MG tablet 90 tablet 1     Sig: Take 1 tablet by mouth Daily.   • sertraline (Zoloft) 25 MG tablet 30 tablet 0     Sig: Take 1 tablet by mouth Daily.

## 2021-10-01 ENCOUNTER — TELEPHONE (OUTPATIENT)
Dept: FAMILY MEDICINE CLINIC | Facility: CLINIC | Age: 66
End: 2021-10-01

## 2021-10-01 RX ORDER — ENALAPRIL MALEATE 5 MG/1
TABLET ORAL
Qty: 135 TABLET | Refills: 1 | Status: SHIPPED | OUTPATIENT
Start: 2021-10-01 | End: 2021-12-06 | Stop reason: SDUPTHER

## 2021-10-01 NOTE — TELEPHONE ENCOUNTER
Caller: [facility] - [facility] [address] AT PORTAL TO REGISTERED [facility] [phone] [facility] [phone] FX    Relationship: Pharmacy      Medication requested (name and dosage): enalapril (VASOTEC) 5 MG tablet    Pharmacy where request should be sent: [facility] [address] AT Portal to [facility] [phone] [facility] [phone] FX  [phone]     REF:[id number]     Additional details provided by patient:     Best call back number:     Does the patient have less than a 3 day supply:  [] Yes  [x] No    Mala Bravo Rep   10/01/21 11:51 EDT

## 2021-10-05 ENCOUNTER — FLU SHOT (OUTPATIENT)
Dept: FAMILY MEDICINE CLINIC | Facility: CLINIC | Age: 66
End: 2021-10-05

## 2021-10-05 DIAGNOSIS — Z23 NEED FOR VACCINATION: Primary | ICD-10-CM

## 2021-10-05 PROCEDURE — 90662 IIV NO PRSV INCREASED AG IM: CPT | Performed by: FAMILY MEDICINE

## 2021-10-05 PROCEDURE — G0008 ADMIN INFLUENZA VIRUS VAC: HCPCS | Performed by: FAMILY MEDICINE

## 2021-10-19 RX ORDER — MULTIPLE VITAMINS W/ MINERALS TAB 9MG-400MCG
1 TAB ORAL DAILY
COMMUNITY
End: 2022-10-10

## 2021-10-22 ENCOUNTER — ANESTHESIA EVENT (OUTPATIENT)
Dept: GASTROENTEROLOGY | Facility: HOSPITAL | Age: 66
End: 2021-10-22

## 2021-10-25 ENCOUNTER — ON CAMPUS - OUTPATIENT (OUTPATIENT)
Dept: URBAN - METROPOLITAN AREA HOSPITAL 85 | Facility: HOSPITAL | Age: 66
End: 2021-10-25

## 2021-10-25 ENCOUNTER — HOSPITAL ENCOUNTER (OUTPATIENT)
Facility: HOSPITAL | Age: 66
Setting detail: HOSPITAL OUTPATIENT SURGERY
Discharge: HOME | End: 2021-10-25
Attending: INTERNAL MEDICINE | Admitting: INTERNAL MEDICINE

## 2021-10-25 ENCOUNTER — ANESTHESIA (OUTPATIENT)
Dept: GASTROENTEROLOGY | Facility: HOSPITAL | Age: 66
End: 2021-10-25

## 2021-10-25 VITALS
RESPIRATION RATE: 16 BRPM | HEIGHT: 64 IN | TEMPERATURE: 98.5 F | WEIGHT: 108.03 LBS | DIASTOLIC BLOOD PRESSURE: 77 MMHG | BODY MASS INDEX: 18.44 KG/M2 | HEART RATE: 75 BPM | SYSTOLIC BLOOD PRESSURE: 128 MMHG | OXYGEN SATURATION: 98 %

## 2021-10-25 DIAGNOSIS — Z86.010 PERSONAL HISTORY OF COLONIC POLYPS: ICD-10-CM

## 2021-10-25 DIAGNOSIS — K21.9 GERD (GASTROESOPHAGEAL REFLUX DISEASE): ICD-10-CM

## 2021-10-25 DIAGNOSIS — Z08 ENCOUNTER FOR FOLLOW-UP EXAMINATION AFTER COMPLETED TREATMEN: ICD-10-CM

## 2021-10-25 DIAGNOSIS — K63.5 POLYP OF COLON: ICD-10-CM

## 2021-10-25 PROCEDURE — 25010000002 PROPOFOL 10 MG/ML EMULSION: Performed by: ANESTHESIOLOGY

## 2021-10-25 PROCEDURE — 88305 TISSUE EXAM BY PATHOLOGIST: CPT | Performed by: INTERNAL MEDICINE

## 2021-10-25 PROCEDURE — 45385 COLONOSCOPY W/LESION REMOVAL: CPT | Mod: PT | Performed by: INTERNAL MEDICINE

## 2021-10-25 RX ORDER — SODIUM CHLORIDE 9 MG/ML
9 INJECTION, SOLUTION INTRAVENOUS CONTINUOUS PRN
Status: DISCONTINUED | OUTPATIENT
Start: 2021-10-25 | End: 2021-10-25 | Stop reason: HOSPADM

## 2021-10-25 RX ORDER — SODIUM CHLORIDE 0.9 % (FLUSH) 0.9 %
3 SYRINGE (ML) INJECTION EVERY 12 HOURS SCHEDULED
Status: DISCONTINUED | OUTPATIENT
Start: 2021-10-25 | End: 2021-10-25 | Stop reason: HOSPADM

## 2021-10-25 RX ORDER — MAGNESIUM CARB/ALUMINUM HYDROX 105-160MG
296 TABLET,CHEWABLE ORAL ONCE
Status: DISCONTINUED | OUTPATIENT
Start: 2021-10-25 | End: 2021-10-25 | Stop reason: HOSPADM

## 2021-10-25 RX ORDER — SODIUM CHLORIDE 0.9 % (FLUSH) 0.9 %
3-10 SYRINGE (ML) INJECTION AS NEEDED
Status: DISCONTINUED | OUTPATIENT
Start: 2021-10-25 | End: 2021-10-25 | Stop reason: HOSPADM

## 2021-10-25 RX ORDER — ONDANSETRON 2 MG/ML
4 INJECTION INTRAMUSCULAR; INTRAVENOUS ONCE AS NEEDED
Status: DISCONTINUED | OUTPATIENT
Start: 2021-10-25 | End: 2021-10-25 | Stop reason: HOSPADM

## 2021-10-25 RX ORDER — PROPOFOL 10 MG/ML
VIAL (ML) INTRAVENOUS AS NEEDED
Status: DISCONTINUED | OUTPATIENT
Start: 2021-10-25 | End: 2021-10-25 | Stop reason: SURG

## 2021-10-25 RX ORDER — SODIUM CHLORIDE 9 MG/ML
30 INJECTION, SOLUTION INTRAVENOUS CONTINUOUS PRN
Status: DISCONTINUED | OUTPATIENT
Start: 2021-10-25 | End: 2021-10-25 | Stop reason: HOSPADM

## 2021-10-25 RX ORDER — SODIUM CHLORIDE 0.9 % (FLUSH) 0.9 %
10 SYRINGE (ML) INJECTION AS NEEDED
Status: DISCONTINUED | OUTPATIENT
Start: 2021-10-25 | End: 2021-10-25 | Stop reason: HOSPADM

## 2021-10-25 RX ORDER — SODIUM CHLORIDE 0.9 % (FLUSH) 0.9 %
10 SYRINGE (ML) INJECTION EVERY 12 HOURS SCHEDULED
Status: DISCONTINUED | OUTPATIENT
Start: 2021-10-25 | End: 2021-10-25 | Stop reason: HOSPADM

## 2021-10-25 RX ADMIN — PROPOFOL 270 MG: 10 INJECTION, EMULSION INTRAVENOUS at 11:11

## 2021-10-25 RX ADMIN — SODIUM CHLORIDE: 0.9 INJECTION, SOLUTION INTRAVENOUS at 11:08

## 2021-10-25 NOTE — OP NOTE
COLONOSCOPY Procedure Report    Patient Name:  Shauna Marks  YOB: 1955    Date of Surgery:  10/25/2021     Pre-Op Diagnosis:  Personal history of colonic polyps [Z86.010]  GERD (gastroesophageal reflux disease) [K21.9]       Post Op Diagnosis:  Colon polyps      Procedure/CPT® Codes:      Procedure(s):  COLONOSCOPY WITH cold snare POLYPECTOMY X11    Staff:  Surgeon(s):  Sudarshan Mitchell MD         Anesthesia: Monitored Anesthesia Care    Implants:    Nothing was implanted during the procedure    Specimen:        See below    No blood loss    Complications:  None    Description of Procedure:  Informed consent was obtained for the procedure, including sedation.  Risks of perforation, hemorrhage, adverse drug reaction and aspiration were discussed.  The patient was brought into the endoscopy suite. Continuous cardiopulmonary monitoring was performed.  The patient was placed in the left lateral decubitus position. After adequate sedation was attained, the digital rectal exam was performed which was normal.  Subsequently, the pediatric Olympus colonoscope was inserted into the patient's rectum and advanced to the level of the cecum and terminal ileum without difficulty using water immersion technique.  The bowel prep was very good.  Circumferential examination of the patient's colon was performed on scope withdrawal.  A retroflex exam was performed in the rectum which showed normal mucosa.  The bowel was decompressed, the scope was withdrawn from the patient, and the patient tolerated the procedure well. There were no immediate post-operative complications.     Findings:    11 colon polyps, 4 to 7 mm in size, removed in single piece fashion with cold snare polypectomy.  One was in the ascending, 4 in the transverse, and 6 in the rectum.    Impression:  As above    Recommendations:  Follow-up histopathology  High-fiber diet  Repeat colonoscopy in 1 year if polyps are adenomatous in 3 years if  they are hyperplastic      Sudarshan Mitchell MD     Date: 10/25/2021  Time: 11:41 EDT

## 2021-10-25 NOTE — H&P
" LOS: 0 days   Patient Care Team:  Talia Grey MD as PCP - General (Family Medicine)  Asa Greenwood MD as Consulting Physician (Vascular Surgery)  Yuliet Segundo MD as Consulting Physician (Hematology and Oncology)      Subjective     Interval History:     Subjective: Personal history of colon polyps      ROS:   No chest pain, shortness of breath, or cough.        Medication Review:   No current facility-administered medications for this encounter.      Objective     Vital Signs  Vitals:    10/19/21 1220 10/25/21 1024   BP:  151/71   Pulse:  82   Resp:  14   Temp:  98.5 °F (36.9 °C)   TempSrc:  Oral   SpO2:  99%   Weight: 49 kg (108 lb) 49 kg (108 lb 0.4 oz)   Height: 161.3 cm (63.5\") 162.6 cm (64\")       Physical Exam:    General Appearance:    Awake and alert, in no acute distress   Head:    Normocephalic, without obvious abnormality   Eyes:          Conjunctivae normal, anicteric sclera   Throat:   No oral lesions, no thrush, oral mucosa moist   Neck:   No adenopathy, supple, no JVD   Lungs:     respirations regular, even and unlabored   Abdomen:     Soft, non-tender, no rebound or guarding, non-distended, no hepatosplenomegaly   Rectal:     Deferred   Extremities:   No edema, no cyanosis   Skin:   No bruising or rash, no jaundice        Results Review:    Lab Results (last 24 hours)     ** No results found for the last 24 hours. **          Imaging Results (Last 24 Hours)     ** No results found for the last 24 hours. **            Assessment/Plan   Proceed with scope and MAC anesthesia        Sudarshan Mitchell MD  10/25/21  10:28 EDT  "

## 2021-10-25 NOTE — DISCHARGE INSTRUCTIONS
A responsible adult should stay with you and you should rest quietly for the rest of the day.    Do not drink alcohol, drive, operate any heavy machinery or power tools or make any legal/important decisions for the next 24 hours.     Progress your diet as tolerated.  If you begin to experience severe pain, increased shortness of breath, racing heartbeat or a fever above 101 F, seek immediate medical attention.     Follow up with MD as instructed. Call office for results in 3 to 5 days if needed.    [phone]

## 2021-10-25 NOTE — ANESTHESIA PREPROCEDURE EVALUATION
Anesthesia Evaluation     Patient summary reviewed and Nursing notes reviewed   NPO Solid Status: > 8 hours  NPO Liquid Status: > 2 hours           Airway   Mallampati: II  TM distance: >3 FB  Neck ROM: full  No difficulty expected  Dental - normal exam     Pulmonary    Cardiovascular     (+) hypertension, PVD, hyperlipidemia,  carotid artery disease carotid bilateral      Neuro/Psych  (+) numbness, psychiatric history,     GI/Hepatic/Renal/Endo    (+)  GERD,  liver disease,     Musculoskeletal     Abdominal    Substance History      OB/GYN          Other      history of cancer    ROS/Med Hx Other: Hx of AAA                  Anesthesia Plan    ASA 3     MAC     intravenous induction     Anesthetic plan, all risks, benefits, and alternatives have been provided, discussed and informed consent has been obtained with: patient.

## 2021-10-26 LAB
LAB AP CASE REPORT: NORMAL
LAB AP DIAGNOSIS COMMENT: NORMAL
PATH REPORT.FINAL DX SPEC: NORMAL
PATH REPORT.GROSS SPEC: NORMAL

## 2021-10-26 NOTE — ANESTHESIA POSTPROCEDURE EVALUATION
Patient: Shauna Marks    Procedure Summary     Date: 10/25/21 Room / Location: [facility] ENDOSCOPY 1 / [facility] ENDOSCOPY    Anesthesia Start: 1105 Anesthesia Stop: 1137    Procedure: COLONOSCOPY WITH POLYPECTOMY X11 (N/A ) Diagnosis:       Personal history of colonic polyps      GERD (gastroesophageal reflux disease)      (Personal history of colonic polyps [Z86.010])      (GERD (gastroesophageal reflux disease) [K21.9])    Surgeons: Sudarshan Mitchell MD Provider: Jelani Cardenas MD    Anesthesia Type: MAC ASA Status: 3          Anesthesia Type: MAC    Vitals  Vitals Value Taken Time   /77 10/25/21 1158   Temp     Pulse 75 10/25/21 1158   Resp 16 10/25/21 1158   SpO2 98 % 10/25/21 1158           Post Anesthesia Care and Evaluation    Patient location during evaluation: PACU  Patient participation: complete - patient participated  Level of consciousness: awake  Pain scale: See nurse's notes for pain score.  Pain management: adequate  Airway patency: patent  Anesthetic complications: No anesthetic complications  PONV Status: none  Cardiovascular status: acceptable  Respiratory status: acceptable  Hydration status: acceptable    Comments: Patient seen and examined postoperatively; vital signs stable; SpO2 greater than or equal to 90%; cardiopulmonary status stable; nausea/vomiting adequately controlled; pain adequately controlled; no apparent anesthesia complications; patient discharged from anesthesia care when discharge criteria were met

## 2021-11-07 DIAGNOSIS — F41.9 ANXIETY: ICD-10-CM

## 2021-11-07 RX ORDER — LORAZEPAM 1 MG/1
TABLET ORAL
Qty: 30 TABLET | Refills: 1 | Status: SHIPPED | OUTPATIENT
Start: 2021-11-07 | End: 2021-11-24 | Stop reason: SDUPTHER

## 2021-11-22 ENCOUNTER — TELEPHONE (OUTPATIENT)
Dept: FAMILY MEDICINE CLINIC | Facility: CLINIC | Age: 66
End: 2021-11-22

## 2021-11-22 NOTE — TELEPHONE ENCOUNTER
Caller: Shauna Marks    Relationship to patient: Self    Best call back number: [phone] (H)    Chief complaint: SHOULDER PAIN    Type of visit:OFFICE VISIT    Requested date: 11/22/21      Additional notes:PATIENT WAS CALLING IN TO TALK TO WATSON GONZALEZ ABOUT SCHEDULING AN APPOINTMENT ON Wednesday ATTEMPTED TO WARM TRANSFER. UNSUCCESSFUL. ALSO ATTEMPTED TO SCHEDULE. NO AVAILABILITY SHOWING FOR Wednesday. PLEASE RETURN PATIENTS CALL. THANK YOU.

## 2021-11-24 ENCOUNTER — OFFICE VISIT (OUTPATIENT)
Dept: FAMILY MEDICINE CLINIC | Facility: CLINIC | Age: 66
End: 2021-11-24

## 2021-11-24 VITALS
OXYGEN SATURATION: 96 % | HEART RATE: 92 BPM | RESPIRATION RATE: 16 BRPM | WEIGHT: 107 LBS | TEMPERATURE: 97.8 F | BODY MASS INDEX: 18.27 KG/M2 | HEIGHT: 64 IN | SYSTOLIC BLOOD PRESSURE: 133 MMHG | DIASTOLIC BLOOD PRESSURE: 88 MMHG

## 2021-11-24 DIAGNOSIS — F41.9 ANXIETY: ICD-10-CM

## 2021-11-24 DIAGNOSIS — F43.9 STRESS: ICD-10-CM

## 2021-11-24 DIAGNOSIS — M25.512 ACUTE PAIN OF LEFT SHOULDER: Primary | ICD-10-CM

## 2021-11-24 PROBLEM — L73.9 FOLLICULITIS: Status: RESOLVED | Noted: 2021-05-17 | Resolved: 2021-11-24
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
## 2021-11-24 PROBLEM — S00.06XA INSECT BITE OF SCALP: Status: RESOLVED | Noted: 2021-05-20 | Resolved: 2021-11-24

## 2021-11-24 PROBLEM — R61 NIGHT SWEATS: Status: RESOLVED | Noted: 2021-07-07 | Resolved: 2021-11-24

## 2021-11-24 PROBLEM — R21 RASH: Status: RESOLVED | Noted: 2021-05-20 | Resolved: 2021-11-24

## 2021-11-24 PROCEDURE — 99213 OFFICE O/P EST LOW 20 MIN: CPT | Performed by: FAMILY MEDICINE

## 2021-11-24 RX ORDER — LORAZEPAM 1 MG/1
1 TABLET ORAL DAILY PRN
Qty: 30 TABLET | Refills: 0
Start: 2021-11-24 | End: 2022-01-11

## 2021-11-24 NOTE — PROGRESS NOTES
Subjective   Chief Complaint   Patient presents with   • Shoulder Pain     LEFT   • Headache   • Fatigue     Shauna Marks is a 66 y.o. female.     Patient Care Team:  Talia Grey MD as PCP - General (Family Medicine)  Asa Greenwood MD as Consulting Physician (Vascular Surgery)  Yuliet Segundo MD as Consulting Physician (Hematology and Oncology)    She is coming in today to discuss some of her symptoms.  She tells me that on 11/21/2021 she started feeling pretty sharp pain behind the left scapula.  This was getting worse with taking a deep breath or moving the arm and shoulder.  She took over-the-counter anti-inflammatories and went to bed and the next morning when she woke up the pain was gone.  The pain has not recurred.  She is doing yoga and she thinks that she might have pulled a muscle.  She has been under a lot of stress for quite some time and she thinks that this might be affecting her physical health as well.  She has been having some on and off headaches and having some fatigue.  She is supposed to travel to see her children for [place] who lives in [place], at this point she is however not sure if she will proceed with trip.  She wanted to come in today and get checked and examined to make sure everything was okay before the holidays.       The following portions of the patient's history were reviewed and updated as appropriate: allergies, current medications, past family history, past medical history, past social history, past surgical history and problem list.  Past Medical History:   Diagnosis Date   • Anxiety    • Blepharitis, right eye    • Dry eyes    • Genital herpes    • GERD (gastroesophageal reflux disease)    • History of transfusion    • Hyperlipidemia    • Hypertension    • IBS (irritable bowel syndrome)    • Leg cramps    • Osteoporosis    • Radiculopathy of cervical spine    • Skin disorder    • Vulva cancer ([facility]) 2014    Dr. Patel     Past Surgical History:  "  Procedure Laterality Date   • BREAST SURGERY      biopsies   • CARDIAC CATHETERIZATION     • COLONOSCOPY  2014   • COLONOSCOPY N/A 10/25/2021    Procedure: COLONOSCOPY WITH POLYPECTOMY X11;  Surgeon: Sudarshan Mitchell MD;  Location: [facility] ENDOSCOPY;  Service: Gastroenterology;  Laterality: N/A;  colon polyps, diverticulosis   • COLONOSCOPY W/ POLYPECTOMY N/A 10/28/2020    Procedure: Colonoscopy with polypectomy x 10;  Surgeon: Sudarshan Mitchell MD;  Location: [facility] ENDOSCOPY;  Service: Gastroenterology;  Laterality: N/A;  colon polyps x 10,  hemorrhoids   • HERNIA REPAIR     • LASER ABLATION CONDYLOMA CERVICAL / VULVAR     • SKIN BIOPSY     • SPLENECTOMY  2014   • SPLENECTOMY     • VARICOSE VEIN SURGERY       The patient has a family history of  Family History   Problem Relation Age of Onset   • Lung cancer Mother    • Coronary artery disease Father      Social History     Socioeconomic History   • Marital status: Significant Other   Tobacco Use   • Smoking status: Current Every Day Smoker     Packs/day: 0.50     Types: Cigarettes   • Smokeless tobacco: Never Used   Substance and Sexual Activity   • Alcohol use: Yes     Comment: social   • Drug use: No   • Sexual activity: Defer       Review of Systems   Constitutional: Positive for fatigue. Negative for activity change and fever.   Respiratory: Negative for shortness of breath and wheezing.    Cardiovascular: Negative for chest pain, palpitations and leg swelling.   Musculoskeletal: Positive for arthralgias. Negative for back pain.   Skin: Negative for rash.   Neurological: Positive for headache. Negative for tremors.   Psychiatric/Behavioral: Positive for stress. The patient is nervous/anxious.      Visit Vitals  /88 (BP Location: Left arm, Patient Position: Sitting, Cuff Size: Adult)   Pulse 92   Temp 97.8 °F (36.6 °C)   Resp 16   Ht 162.6 cm (64.02\")   Wt 48.5 kg (107 lb)   LMP  (LMP Unknown)   SpO2 96%   BMI 18.36 kg/m²       Current " Outpatient Medications:   •  acyclovir (ZOVIRAX) 400 MG tablet, Take 1 tablet by mouth Daily., Disp: 90 tablet, Rfl: 1  •  acyclovir (Zovirax) 5 % ointment, Apply 1 application topically to the appropriate area as directed Every 4 (Four) Hours. (Patient taking differently: Apply 1 application topically to the appropriate area as directed As Needed.), Disp: 15 g, Rfl: 0  •  betamethasone valerate (VALISONE) 0.1 % ointment, As Needed., Disp: , Rfl:   •  Biotin 10 MG capsule, Take  by mouth., Disp: , Rfl:   •  clobetasol (TEMOVATE) 0.05 % ointment, Apply 1 application topically to the appropriate area as directed As Needed., Disp: , Rfl:   •  cycloSPORINE (Restasis) 0.05 % ophthalmic emulsion, Administer 1 drop to both eyes 3 (Three) Times a Day., Disp: , Rfl:   •  dicyclomine (BENTYL) 20 MG tablet, Take 20 mg by mouth Every 6 (Six) Hours As Needed., Disp: , Rfl:   •  Digestive Enzymes capsule, Take 1 dose by mouth 3 (Three) Times a Day., Disp: , Rfl:   •  enalapril (VASOTEC) 5 MG tablet, Take 1 tablet p.o. every morning and take half a tablet p.o. every evening (Patient taking differently: Take 5 mg by mouth 2 (Two) Times a Day.), Disp: 135 tablet, Rfl: 1  •  hydrocortisone 2.5 % cream, Apply 1 application topically to the appropriate area as directed As Needed., Disp: , Rfl:   •  INV ALLIANCE, AMB, KIT, Pharmacy Label,, Take  by mouth 2 (two) times a day. Healthy bone supplement, Disp: , Rfl:   •  Lactobacillus Acid-Pectin (ACIDOPHILUS/PECTIN) capsule, Take 1 capsule by mouth Daily., Disp: , Rfl:   •  LORazepam (ATIVAN) 1 MG tablet, Take 1 tablet by mouth Daily As Needed for Anxiety., Disp: 30 tablet, Rfl: 0  •  Loteprednol Etabonate (Lotemax) 0.5 % ointment, Apply 1 application to eye(s) as directed by provider As Needed., Disp: , Rfl:   •  Magnesium 200 MG tablet, Take 100 mg by mouth Daily., Disp: , Rfl:   •  Multiple Vitamins-Minerals (multivitamin with minerals) tablet tablet, Take 1 tablet by mouth Daily.,  Disp: , Rfl:   •  multivitamin with minerals (MULTIVITAMIN ADULT PO), Take 1 tablet by mouth Daily., Disp: , Rfl:   •  polyethylene glycol (MIRALAX) powder powder, Take 17 g by mouth As Needed., Disp: , Rfl:   •  Probiotic Product (PROBIOTIC-10 PO), Take 1 dose by mouth Daily., Disp: , Rfl:   •  rosuvastatin (CRESTOR) 5 MG tablet, TAKE ONE TABLET BY MOUTH EVERY NIGHT AT BEDTIME (Patient taking differently: Take 5 mg by mouth Every Night.), Disp: 90 tablet, Rfl: 0  •  sertraline (Zoloft) 25 MG tablet, Take 1 tablet by mouth Daily., Disp: 30 tablet, Rfl: 0  •  vitamin C (ASCORBIC ACID) 500 MG tablet, Take 1,000 mg by mouth Daily., Disp: , Rfl:     Objective   Physical Exam  Vitals and nursing note reviewed.   Constitutional:       General: She is not in acute distress.     Appearance: Normal appearance. She is well-developed. She is not ill-appearing.   HENT:      Head: Normocephalic and atraumatic.   Cardiovascular:      Rate and Rhythm: Normal rate and regular rhythm.      Heart sounds: Normal heart sounds. No murmur heard.  No gallop.    Pulmonary:      Effort: Pulmonary effort is normal. No respiratory distress.      Breath sounds: Normal breath sounds. No wheezing, rhonchi or rales.   Chest:      Chest wall: No tenderness.   Musculoskeletal:      Cervical back: Normal range of motion and neck supple.      Comments: Full range of motion in the left shoulder was noted, no palpation tenderness.   Neurological:      General: No focal deficit present.      Mental Status: She is alert and oriented to person, place, and time. Mental status is at baseline.   Psychiatric:         Mood and Affect: Mood normal.         Assessment/Plan   Diagnoses and all orders for this visit:    1. Acute pain of left shoulder (Primary)    2. Stress    3. Anxiety  -     LORazepam (ATIVAN) 1 MG tablet; Take 1 tablet by mouth Daily As Needed for Anxiety.  Dispense: 30 tablet; Refill: 0      I reviewed her symptoms and concerns.  Her exam  today is intact, there is full range of motion in left shoulder noted.  She definitely is under a lot of stress, she also has some anxiety which she already has been treated for and she takes lorazepam as needed.  She may continue that.  She may take over-the-counter anti-inflammatories if there is any flareup of the pain.  She is to contact us back if any questions or concerns.          No follow-ups on file.    Requested Prescriptions     Signed Prescriptions Disp Refills   • LORazepam (ATIVAN) 1 MG tablet 30 tablet 0     Sig: Take 1 tablet by mouth Daily As Needed for Anxiety.

## 2021-12-06 DIAGNOSIS — I73.9 PERIPHERAL ARTERIAL DISEASE (HCC): ICD-10-CM

## 2021-12-06 DIAGNOSIS — I70.90 ATHEROSCLEROSIS: ICD-10-CM

## 2021-12-06 RX ORDER — ROSUVASTATIN CALCIUM 5 MG/1
5 TABLET, COATED ORAL
Qty: 90 TABLET | Refills: 2 | Status: SHIPPED | OUTPATIENT
Start: 2021-12-06 | End: 2022-10-19

## 2021-12-06 RX ORDER — ROSUVASTATIN CALCIUM 5 MG/1
5 TABLET, COATED ORAL
Qty: 90 TABLET | Refills: 2 | Status: SHIPPED | OUTPATIENT
Start: 2021-12-06 | End: 2021-12-06 | Stop reason: SDUPTHER

## 2021-12-06 RX ORDER — ENALAPRIL MALEATE 5 MG/1
TABLET ORAL
Qty: 135 TABLET | Refills: 1 | Status: SHIPPED | OUTPATIENT
Start: 2021-12-06 | End: 2021-12-07 | Stop reason: SDUPTHER

## 2021-12-07 DIAGNOSIS — I73.9 PERIPHERAL ARTERIAL DISEASE (HCC): ICD-10-CM

## 2021-12-07 DIAGNOSIS — I70.90 ATHEROSCLEROSIS: ICD-10-CM

## 2021-12-07 RX ORDER — ENALAPRIL MALEATE 5 MG/1
5 TABLET ORAL 2 TIMES DAILY
Qty: 180 TABLET | Refills: 1 | Status: SHIPPED | OUTPATIENT
Start: 2021-12-07 | End: 2022-08-12 | Stop reason: SDUPTHER

## 2021-12-07 RX ORDER — ROSUVASTATIN CALCIUM 5 MG/1
TABLET, COATED ORAL
Qty: 90 TABLET | Refills: 2 | OUTPATIENT
Start: 2021-12-07

## 2022-01-11 DIAGNOSIS — F41.9 ANXIETY: ICD-10-CM

## 2022-01-11 RX ORDER — LORAZEPAM 1 MG/1
TABLET ORAL
Qty: 30 TABLET | Refills: 1 | Status: SHIPPED | OUTPATIENT
Start: 2022-01-11 | End: 2022-03-14

## 2022-01-14 ENCOUNTER — OFFICE VISIT (OUTPATIENT)
Dept: FAMILY MEDICINE CLINIC | Facility: CLINIC | Age: 67
End: 2022-01-14

## 2022-01-14 VITALS
DIASTOLIC BLOOD PRESSURE: 68 MMHG | OXYGEN SATURATION: 97 % | TEMPERATURE: 96.9 F | HEIGHT: 64 IN | WEIGHT: 109.4 LBS | RESPIRATION RATE: 16 BRPM | SYSTOLIC BLOOD PRESSURE: 118 MMHG | HEART RATE: 87 BPM | BODY MASS INDEX: 18.68 KG/M2

## 2022-01-14 DIAGNOSIS — J06.9 ACUTE URI: Primary | ICD-10-CM

## 2022-01-14 DIAGNOSIS — G44.52 NEW DAILY PERSISTENT HEADACHE: ICD-10-CM

## 2022-01-14 PROCEDURE — 99213 OFFICE O/P EST LOW 20 MIN: CPT | Performed by: FAMILY MEDICINE

## 2022-01-14 PROCEDURE — U0004 COV-19 TEST NON-CDC HGH THRU: HCPCS | Performed by: FAMILY MEDICINE

## 2022-01-14 PROCEDURE — U0005 INFEC AGEN DETEC AMPLI PROBE: HCPCS | Performed by: FAMILY MEDICINE

## 2022-01-14 RX ORDER — FLUTICASONE PROPIONATE 50 MCG
2 SPRAY, SUSPENSION (ML) NASAL DAILY
Qty: 16 G | Refills: 0 | Status: SHIPPED | OUTPATIENT
Start: 2022-01-14 | End: 2022-10-26

## 2022-01-14 NOTE — PROGRESS NOTES
Subjective   Chief Complaint   Patient presents with   • Headache     X 2 WEEKS   • Neck Pain     Shauna Marks is a 66 y.o. female.     Patient Care Team:  Talia Grey MD as PCP - General (Family Medicine)  Asa Greenwood MD as Consulting Physician (Vascular Surgery)  Yuliet Segundo MD as Consulting Physician (Hematology and Oncology)    She is coming in today due to some headaches, which she has been experiencing for the last 2 weeks.  She tells me that headaches start in the morning after she wakes up, but sometimes they wake her up in the morning.  It feels like a band around her head and it radiates to her neck.  She typically drinks some coffee and does some neck exercises as she has been having neck issues for some time even prior to her current headaches.  If that does not help she takes 1 Advil and the headache typically goes away within 1 hour.  She also has had some drainage in the back of her throat, at times she feels some pressure in her ears.  However no fever, cough, or difficulty breathing are being reported.  She is fully vaccinated against COVID-19 and she even had her booster shot.  She is not aware of being exposed to anybody who is sick       The following portions of the patient's history were reviewed and updated as appropriate: allergies, current medications, past family history, past medical history, past social history, past surgical history and problem list.  Past Medical History:   Diagnosis Date   • Anxiety    • Blepharitis, right eye    • Colon polyp    • Diverticulosis    • Dry eyes    • Genital herpes    • GERD (gastroesophageal reflux disease)    • Headache    • History of transfusion    • Hyperlipidemia    • Hypertension    • IBS (irritable bowel syndrome)    • Leg cramps    • Osteoporosis    • Radiculopathy of cervical spine    • Skin disorder    • Vulva cancer ([facility]) 2014    Dr. Patel     Past Surgical History:   Procedure Laterality Date   • BREAST SURGERY       "biopsies   • CARDIAC CATHETERIZATION     •  SECTION     • COLONOSCOPY     • COLONOSCOPY N/A 10/25/2021    Procedure: COLONOSCOPY WITH POLYPECTOMY X11;  Surgeon: Sudarshan Mitchell MD;  Location: [facility] ENDOSCOPY;  Service: Gastroenterology;  Laterality: N/A;  colon polyps, diverticulosis   • COLONOSCOPY W/ POLYPECTOMY N/A 10/28/2020    Procedure: Colonoscopy with polypectomy x 10;  Surgeon: Sudarshan Mitchell MD;  Location: [facility] ENDOSCOPY;  Service: Gastroenterology;  Laterality: N/A;  colon polyps x 10,  hemorrhoids   • HERNIA REPAIR     • LASER ABLATION CONDYLOMA CERVICAL / VULVAR     • SKIN BIOPSY     • SPLENECTOMY     • SPLENECTOMY     • VARICOSE VEIN SURGERY       The patient has a family history of  Family History   Problem Relation Age of Onset   • Lung cancer Mother    • Cancer Mother    • Coronary artery disease Father    • Heart disease Father    • Hypertension Father      Social History     Socioeconomic History   • Marital status: Significant Other   Tobacco Use   • Smoking status: Light Tobacco Smoker     Packs/day: 0.50     Years: 0.00     Pack years: 0.00     Types: Cigarettes   • Smokeless tobacco: Never Used   Substance and Sexual Activity   • Alcohol use: Yes     Alcohol/week: 0.0 standard drinks     Comment: social   • Drug use: No   • Sexual activity: Not Currently     Partners: Male       Review of Systems   Constitutional: Negative for activity change, appetite change, chills and fever.   HENT: Positive for congestion and postnasal drip. Negative for ear pain, sinus pressure, sore throat and swollen glands.    Respiratory: Negative for cough, choking, chest tightness, shortness of breath, wheezing and stridor.    Cardiovascular: Negative for chest pain.   Skin: Negative for dry skin and rash.     Visit Vitals  /68 (BP Location: Left arm, Patient Position: Sitting, Cuff Size: Adult)   Pulse 87   Temp 96.9 °F (36.1 °C)   Resp 16   Ht 162.6 cm (64.02\")   Wt 49.6 kg " (109 lb 6.4 oz)   LMP  (LMP Unknown)   SpO2 97%   BMI 18.77 kg/m²       Current Outpatient Medications:   •  acyclovir (ZOVIRAX) 400 MG tablet, Take 1 tablet by mouth Daily., Disp: 90 tablet, Rfl: 1  •  acyclovir (Zovirax) 5 % ointment, Apply 1 application topically to the appropriate area as directed Every 4 (Four) Hours. (Patient taking differently: Apply 1 application topically to the appropriate area as directed As Needed.), Disp: 15 g, Rfl: 0  •  betamethasone valerate (VALISONE) 0.1 % ointment, As Needed., Disp: , Rfl:   •  Biotin 10 MG capsule, Take  by mouth., Disp: , Rfl:   •  clobetasol (TEMOVATE) 0.05 % ointment, Apply 1 application topically to the appropriate area as directed As Needed., Disp: , Rfl:   •  cycloSPORINE (Restasis) 0.05 % ophthalmic emulsion, Administer 1 drop to both eyes 3 (Three) Times a Day., Disp: , Rfl:   •  dicyclomine (BENTYL) 20 MG tablet, Take 20 mg by mouth Every 6 (Six) Hours As Needed., Disp: , Rfl:   •  Digestive Enzymes capsule, Take 1 dose by mouth 3 (Three) Times a Day., Disp: , Rfl:   •  enalapril (VASOTEC) 5 MG tablet, Take 1 tablet by mouth 2 (Two) Times a Day., Disp: 180 tablet, Rfl: 1  •  fluticasone (FLONASE) 50 MCG/ACT nasal spray, 2 sprays into the nostril(s) as directed by provider Daily for 30 days., Disp: 16 g, Rfl: 0  •  hydrocortisone 2.5 % cream, Apply 1 application topically to the appropriate area as directed As Needed., Disp: , Rfl:   •  INV ALLIANCE, AMB, KIT, Pharmacy Label,, Take  by mouth 2 (two) times a day. Healthy bone supplement, Disp: , Rfl:   •  Lactobacillus Acid-Pectin (ACIDOPHILUS/PECTIN) capsule, Take 1 capsule by mouth Daily., Disp: , Rfl:   •  LORazepam (ATIVAN) 1 MG tablet, TAKE ONE TABLET BY MOUTH DAILY AS NEEDED FOR ANXIETY, Disp: 30 tablet, Rfl: 1  •  Loteprednol Etabonate (Lotemax) 0.5 % ointment, Apply 1 application to eye(s) as directed by provider As Needed., Disp: , Rfl:   •  Magnesium 200 MG tablet, Take 100 mg by mouth Daily.,  Disp: , Rfl:   •  Multiple Vitamins-Minerals (multivitamin with minerals) tablet tablet, Take 1 tablet by mouth Daily., Disp: , Rfl:   •  multivitamin with minerals (MULTIVITAMIN ADULT PO), Take 1 tablet by mouth Daily., Disp: , Rfl:   •  polyethylene glycol (MIRALAX) powder powder, Take 17 g by mouth As Needed., Disp: , Rfl:   •  Probiotic Product (PROBIOTIC-10 PO), Take 1 dose by mouth Daily., Disp: , Rfl:   •  rosuvastatin (CRESTOR) 5 MG tablet, Take 1 tablet by mouth every night at bedtime., Disp: 90 tablet, Rfl: 2  •  vitamin C (ASCORBIC ACID) 500 MG tablet, Take 1,000 mg by mouth Daily., Disp: , Rfl:     Objective   Physical Exam  Vitals and nursing note reviewed.   Constitutional:       General: She is not in acute distress.     Appearance: She is well-developed.   HENT:      Head: Normocephalic and atraumatic.      Comments: Some mild congestion noted.     Right Ear: External ear normal.      Left Ear: External ear normal.      Mouth/Throat:      Pharynx: No oropharyngeal exudate.   Eyes:      Conjunctiva/sclera: Conjunctivae normal.      Pupils: Pupils are equal, round, and reactive to light.   Cardiovascular:      Rate and Rhythm: Normal rate and regular rhythm.      Heart sounds: Normal heart sounds.   Pulmonary:      Effort: Pulmonary effort is normal. No respiratory distress.      Breath sounds: Normal breath sounds. No wheezing or rales.   Musculoskeletal:      Cervical back: Normal range of motion and neck supple.   Skin:     General: Skin is warm and dry.      Findings: No rash.   Neurological:      Cranial Nerves: No cranial nerve deficit.      Sensory: No sensory deficit.      Motor: No weakness.      Coordination: Coordination normal.       Assessment/Plan   Diagnoses and all orders for this visit:    1. Acute URI (Primary)  -     COVID-19,APTIMA PANTHER(PHYLLIS), [facility]/ DG, NP/OP SWAB IN UTM/VTM/SALINE TRANSPORT MEDIA,24 HR TAT - Swab, Nasopharynx; Future  -     fluticasone (FLONASE) 50 MCG/ACT  nasal spray; 2 sprays into the nostril(s) as directed by provider Daily for 30 days.  Dispense: 16 g; Refill: 0  -     COVID-19,APTIMA PANTHER(PHYLLIS),[facility]/ DG, NP/OP SWAB IN UTM/VTM/SALINE TRANSPORT MEDIA,24 HR TAT - Swab, Nasopharynx    2. New daily persistent headache      I reviewed her symptoms and concerns.  I will be testing her for COVID-19.  I also advised for her to start taking Flonase and prescription was given.  She may continue over-the-counter anti-inflammatories as needed for headache.  Further monitoring was advised.        Return if symptoms worsen or fail to improve, for Recheck.    Requested Prescriptions     Signed Prescriptions Disp Refills   • fluticasone (FLONASE) 50 MCG/ACT nasal spray 16 g 0     Si sprays into the nostril(s) as directed by provider Daily for 30 days.

## 2022-01-15 LAB — SARS-COV-2 ORF1AB RESP QL NAA+PROBE: NOT DETECTED

## 2022-02-01 RX ORDER — DICYCLOMINE HCL 20 MG
20 TABLET ORAL 2 TIMES DAILY PRN
Qty: 60 TABLET | Refills: 0 | Status: SHIPPED | OUTPATIENT
Start: 2022-02-01

## 2022-03-14 DIAGNOSIS — F41.9 ANXIETY: ICD-10-CM

## 2022-03-14 RX ORDER — LORAZEPAM 1 MG/1
TABLET ORAL
Qty: 30 TABLET | Refills: 0 | Status: SHIPPED | OUTPATIENT
Start: 2022-03-14 | End: 2022-04-13

## 2022-03-23 ENCOUNTER — HOSPITAL ENCOUNTER (OUTPATIENT)
Dept: GENERAL RADIOLOGY | Facility: HOSPITAL | Age: 67
Discharge: HOME | End: 2022-03-23

## 2022-03-23 ENCOUNTER — OFFICE VISIT (OUTPATIENT)
Dept: FAMILY MEDICINE CLINIC | Facility: CLINIC | Age: 67
End: 2022-03-23

## 2022-03-23 ENCOUNTER — LAB (OUTPATIENT)
Dept: LAB | Facility: HOSPITAL | Age: 67
End: 2022-03-23

## 2022-03-23 VITALS
HEART RATE: 96 BPM | TEMPERATURE: 97.1 F | SYSTOLIC BLOOD PRESSURE: 122 MMHG | DIASTOLIC BLOOD PRESSURE: 81 MMHG | BODY MASS INDEX: 17.87 KG/M2 | WEIGHT: 111.2 LBS | RESPIRATION RATE: 16 BRPM | HEIGHT: 66 IN | OXYGEN SATURATION: 98 %

## 2022-03-23 DIAGNOSIS — R51.9 CHRONIC NONINTRACTABLE HEADACHE, UNSPECIFIED HEADACHE TYPE: ICD-10-CM

## 2022-03-23 DIAGNOSIS — G89.29 CHRONIC NONINTRACTABLE HEADACHE, UNSPECIFIED HEADACHE TYPE: ICD-10-CM

## 2022-03-23 DIAGNOSIS — R51.9 CHRONIC NONINTRACTABLE HEADACHE, UNSPECIFIED HEADACHE TYPE: Primary | ICD-10-CM

## 2022-03-23 DIAGNOSIS — M54.2 NECK PAIN: ICD-10-CM

## 2022-03-23 DIAGNOSIS — G89.29 CHRONIC NONINTRACTABLE HEADACHE, UNSPECIFIED HEADACHE TYPE: Primary | ICD-10-CM

## 2022-03-23 DIAGNOSIS — L60.3 ONYCHODYSTROPHY: ICD-10-CM

## 2022-03-23 LAB
CRP SERPL-MCNC: <0.3 MG/DL (ref 0–0.5)
ERYTHROCYTE [SEDIMENTATION RATE] IN BLOOD: 4 MM/HR (ref 0–30)

## 2022-03-23 PROCEDURE — 99214 OFFICE O/P EST MOD 30 MIN: CPT | Performed by: FAMILY MEDICINE

## 2022-03-23 PROCEDURE — 86140 C-REACTIVE PROTEIN: CPT | Performed by: FAMILY MEDICINE

## 2022-03-23 PROCEDURE — 85652 RBC SED RATE AUTOMATED: CPT | Performed by: FAMILY MEDICINE

## 2022-03-23 PROCEDURE — 72040 X-RAY EXAM NECK SPINE 2-3 VW: CPT

## 2022-03-23 PROCEDURE — 36415 COLL VENOUS BLD VENIPUNCTURE: CPT | Performed by: FAMILY MEDICINE

## 2022-03-23 RX ORDER — PREDNISONE 20 MG/1
20 TABLET ORAL 2 TIMES DAILY
Qty: 10 TABLET | Refills: 0 | Status: SHIPPED | OUTPATIENT
Start: 2022-03-23 | End: 2022-05-30

## 2022-03-23 NOTE — PROGRESS NOTES
Subjective   Chief Complaint   Patient presents with   • Headache   • Nail Problem     Shauna Marks is a 66 y.o. female.     Patient Care Team:  Talia Grey MD as PCP - General (Family Medicine)  Asa Greenwood MD as Consulting Physician (Vascular Surgery)  Yuliet Segundo MD as Consulting Physician (Hematology and Oncology)  Tasneem Macias OD (Optometry)    She is coming in today to talk about some of her symptoms and concerns.  First of all she wants to talk about the headaches, she has been dealing with headaches on and off for quite some time and they are lately getting worse.  The headaches typically start in the neck area and spread around her head and she thinks it is in part due to her neck issues and tension headaches.  She was previously diagnosed with cervical radiculopathy a long time ago.  She even started taking some Aleve over-the-counter and that is not helping much.  She also tells me that for about a month she has been experiencing some right eye issues and she has seen her optometrist for that.  About 3 days ago she started noticing that the headaches are getting worse and feel different.  She feels tenderness and even swelling behind the right temple area.  No visual loss reported.  She also would like to talk about her right great toenail issues.  She has had a discoloration on that toenail for some time and would like that to be checked out.       The following portions of the patient's history were reviewed and updated as appropriate: allergies, current medications, past family history, past medical history, past social history, past surgical history and problem list.  Past Medical History:   Diagnosis Date   • Anxiety    • Blepharitis, right eye    • Colon polyp    • Diverticulosis    • Dry eyes    • Genital herpes    • GERD (gastroesophageal reflux disease)    • Headache    • History of transfusion    • Hyperlipidemia    • Hypertension    • IBS (irritable bowel syndrome)   "  • Leg cramps    • Osteoporosis    • Radiculopathy of cervical spine    • Skin disorder    • Vulva cancer (HCC)     Dr. Patel     Past Surgical History:   Procedure Laterality Date   • BREAST SURGERY      biopsies   • CARDIAC CATHETERIZATION     •  SECTION     • COLONOSCOPY     • COLONOSCOPY N/A 10/25/2021    Procedure: COLONOSCOPY WITH POLYPECTOMY X11;  Surgeon: Sudarshan Mitchell MD;  Location: [facility] ENDOSCOPY;  Service: Gastroenterology;  Laterality: N/A;  colon polyps, diverticulosis   • COLONOSCOPY W/ POLYPECTOMY N/A 10/28/2020    Procedure: Colonoscopy with polypectomy x 10;  Surgeon: Sudarshan Mitchell MD;  Location: [facility] ENDOSCOPY;  Service: Gastroenterology;  Laterality: N/A;  colon polyps x 10,  hemorrhoids   • HERNIA REPAIR     • LASER ABLATION CONDYLOMA CERVICAL / VULVAR     • SKIN BIOPSY     • SPLENECTOMY     • SPLENECTOMY     • VARICOSE VEIN SURGERY       The patient has a family history of  Family History   Problem Relation Age of Onset   • Lung cancer Mother    • Cancer Mother    • Coronary artery disease Father    • Heart disease Father    • Hypertension Father      Social History     Socioeconomic History   • Marital status: Significant Other   Tobacco Use   • Smoking status: Light Tobacco Smoker     Packs/day: 0.50     Years: 0.00     Pack years: 0.00     Types: Cigarettes   • Smokeless tobacco: Never Used   Substance and Sexual Activity   • Alcohol use: Yes     Alcohol/week: 0.0 standard drinks     Comment: social   • Drug use: No   • Sexual activity: Not Currently     Partners: Male       Review of Systems   Eyes: Negative for blurred vision, double vision, discharge, itching and visual disturbance.   Neurological: Positive for headache. Negative for dizziness.     Visit Vitals  /81 (BP Location: Right arm, Patient Position: Sitting, Cuff Size: Adult)   Pulse 96   Temp 97.1 °F (36.2 °C) (Temporal)   Resp 16   Ht 167.6 cm (66\")   Wt 50.4 kg (111 lb 3.2 oz) "   LMP  (LMP Unknown)   SpO2 98%   BMI 17.95 kg/m²       Current Outpatient Medications:   •  acyclovir (Zovirax) 5 % ointment, Apply 1 application topically to the appropriate area as directed Every 4 (Four) Hours. (Patient taking differently: Apply 1 application topically to the appropriate area as directed As Needed.), Disp: 15 g, Rfl: 0  •  acyclovir (ZOVIRAX) 400 MG tablet, Take 1 tablet by mouth Daily., Disp: 90 tablet, Rfl: 1  •  betamethasone valerate (VALISONE) 0.1 % ointment, As Needed., Disp: , Rfl:   •  clobetasol (TEMOVATE) 0.05 % ointment, Apply 1 application topically to the appropriate area as directed As Needed., Disp: , Rfl:   •  cycloSPORINE (Restasis) 0.05 % ophthalmic emulsion, Administer 1 drop to both eyes 3 (Three) Times a Day., Disp: , Rfl:   •  dicyclomine (BENTYL) 20 MG tablet, Take 1 tablet by mouth 2 (Two) Times a Day As Needed (IBS)., Disp: 60 tablet, Rfl: 0  •  Digestive Enzymes capsule, Take 1 dose by mouth 3 (Three) Times a Day., Disp: , Rfl:   •  enalapril (VASOTEC) 5 MG tablet, Take 1 tablet by mouth 2 (Two) Times a Day., Disp: 180 tablet, Rfl: 1  •  fluticasone (FLONASE) 50 MCG/ACT nasal spray, 2 sprays into the nostril(s) as directed by provider Daily for 30 days., Disp: 16 g, Rfl: 0  •  hydrocortisone 2.5 % cream, Apply 1 application topically to the appropriate area as directed As Needed., Disp: , Rfl:   •  INV ALLIANCE, AMB, KIT, Pharmacy Label,, Take  by mouth 2 (two) times a day. Healthy bone supplement, Disp: , Rfl:   •  Lactobacillus Acid-Pectin (ACIDOPHILUS/PECTIN) capsule, Take 1 capsule by mouth Daily., Disp: , Rfl:   •  LORazepam (ATIVAN) 1 MG tablet, TAKE ONE TABLET BY MOUTH DAILY AS NEEDED FOR ANXIETY, Disp: 30 tablet, Rfl: 0  •  Magnesium 200 MG tablet, Take 100 mg by mouth Daily., Disp: , Rfl:   •  Multiple Vitamins-Minerals (multivitamin with minerals) tablet tablet, Take 1 tablet by mouth Daily., Disp: , Rfl:   •  multivitamin with minerals (MULTIVITAMIN ADULT  PO), Take 1 tablet by mouth Daily., Disp: , Rfl:   •  polyethylene glycol (MIRALAX) powder powder, Take 17 g by mouth As Needed., Disp: , Rfl:   •  predniSONE (DELTASONE) 20 MG tablet, Take 1 tablet by mouth 2 (Two) Times a Day., Disp: 10 tablet, Rfl: 0  •  Probiotic Product (PROBIOTIC-10 PO), Take 1 dose by mouth Daily., Disp: , Rfl:   •  rosuvastatin (CRESTOR) 5 MG tablet, Take 1 tablet by mouth every night at bedtime., Disp: 90 tablet, Rfl: 2  •  vitamin C (ASCORBIC ACID) 500 MG tablet, Take 1,000 mg by mouth Daily., Disp: , Rfl:     Objective   Physical Exam  Constitutional:       General: She is not in acute distress.     Appearance: Normal appearance. She is well-developed. She is not ill-appearing or diaphoretic.      Comments: Patient is in no distress, patient has normal voice and speech.  Normal respiratory effort.   HENT:      Head: Normocephalic and atraumatic.   Pulmonary:      Effort: Pulmonary effort is normal.   Musculoskeletal:      Cervical back: Normal range of motion and neck supple.      Comments: There is some palpation tenderness over the scalp just above the right ear, it is posteriorly from the right temple area.  There is no palpation tenderness over the temporal artery.  No skin associated changes.   Skin:     Comments: There is a localized yellowish discoloration noted on the lateral side of the right great toenail.   Neurological:      General: No focal deficit present.      Mental Status: She is alert and oriented to person, place, and time. Mental status is at baseline.      Cranial Nerves: No cranial nerve deficit.      Sensory: No sensory deficit.      Motor: No weakness.      Coordination: Coordination normal.   Psychiatric:         Mood and Affect: Mood normal.       CBC    CBC 7/7/21 8/23/21   WBC 13.00 (A) 13.20 (A)   RBC 4.20 3.97   Hemoglobin 13.8 13.0   Hematocrit 41.1 39.3   MCV 97.9 (A) 99.0 (A)   MCH 32.9 32.7   MCHC 33.6 33.1   RDW 12.9 14.2   Platelets 275 252   (A)  Abnormal value              Assessment/Plan   Diagnoses and all orders for this visit:    1. Chronic nonintractable headache, unspecified headache type (Primary)  -     Sedimentation Rate  -     C-reactive Protein  -     predniSONE (DELTASONE) 20 MG tablet; Take 1 tablet by mouth 2 (Two) Times a Day.  Dispense: 10 tablet; Refill: 0  -     XR Spine Cervical 2 or 3 View; Future    2. Neck pain  -     XR Spine Cervical 2 or 3 View; Future    3. Onychodystrophy      I reviewed her symptoms and concerns.  Patient has been dealing with headaches on and off for some time and majority of her headaches seem to have cervicogenic and tension headache component.  However she reports some tenderness close to the right temple and I will be checking inflammatory markers to rule out temporal arteritis.  I will be also starting her on steroid.  I will be also getting x-ray of the cervical spine.  Patient had recent evaluation by eye doctor due to some visual problems, however she denies any visual loss.  We also addressed her toenail issues and I advised for her to start using tea tree oil topically to see if this will help.  Questions were answered.        Return if symptoms worsen or fail to improve, for Recheck.    Requested Prescriptions     Signed Prescriptions Disp Refills   • predniSONE (DELTASONE) 20 MG tablet 10 tablet 0     Sig: Take 1 tablet by mouth 2 (Two) Times a Day.

## 2022-04-13 DIAGNOSIS — F41.9 ANXIETY: ICD-10-CM

## 2022-04-13 RX ORDER — LORAZEPAM 1 MG/1
TABLET ORAL
Qty: 30 TABLET | Refills: 0 | Status: SHIPPED | OUTPATIENT
Start: 2022-04-13 | End: 2022-05-16

## 2022-04-19 ENCOUNTER — APPOINTMENT (OUTPATIENT)
Dept: VASCULAR SURGERY | Facility: HOSPITAL | Age: 67
End: 2022-04-19

## 2022-04-19 PROCEDURE — G0463 HOSPITAL OUTPT CLINIC VISIT: HCPCS

## 2022-04-21 ENCOUNTER — TRANSCRIBE ORDERS (OUTPATIENT)
Dept: ADMINISTRATIVE | Facility: HOSPITAL | Age: 67
End: 2022-04-21

## 2022-04-21 DIAGNOSIS — I71.40 ABDOMINAL AORTIC ANEURYSM WITHOUT RUPTURE: ICD-10-CM

## 2022-04-21 DIAGNOSIS — I73.9 PERIPHERAL VASCULAR DISEASE, UNSPECIFIED: Primary | ICD-10-CM

## 2022-04-21 DIAGNOSIS — I65.23 BILATERAL CAROTID ARTERY OCCLUSION: ICD-10-CM

## 2022-04-24 RX ORDER — ACYCLOVIR 400 MG/1
TABLET ORAL
Qty: 90 TABLET | Refills: 1 | Status: SHIPPED | OUTPATIENT
Start: 2022-04-24 | End: 2022-10-19

## 2022-05-10 ENCOUNTER — HOSPITAL ENCOUNTER (OUTPATIENT)
Dept: CARDIOLOGY | Facility: HOSPITAL | Age: 67
Discharge: HOME | End: 2022-05-10

## 2022-05-10 ENCOUNTER — APPOINTMENT (OUTPATIENT)
Dept: VASCULAR SURGERY | Facility: HOSPITAL | Age: 67
End: 2022-05-10

## 2022-05-10 DIAGNOSIS — I71.40 ABDOMINAL AORTIC ANEURYSM WITHOUT RUPTURE: ICD-10-CM

## 2022-05-10 DIAGNOSIS — I65.23 BILATERAL CAROTID ARTERY OCCLUSION: ICD-10-CM

## 2022-05-10 DIAGNOSIS — I73.9 PERIPHERAL VASCULAR DISEASE, UNSPECIFIED: ICD-10-CM

## 2022-05-10 LAB
ABDOMINAL DIST AORTA AP: 2.1 CM
ABDOMINAL DIST AORTA TRANS: 2 CM
ABDOMINAL DIST AORTA VEL: 51 CM/S
ABDOMINAL LT COM ILIAC AP: 0.8 CM
ABDOMINAL LT COM ILIAC TRANS: 0.7 CM
ABDOMINAL LT COM ILIAC VEL: 164 CM/S
ABDOMINAL MID AORTA AP: 2 CM
ABDOMINAL MID AORTA TRANS: 1.8 CM
ABDOMINAL MID AORTA VEL: 50 CM/S
ABDOMINAL PROX AORTA AP: 2.5 CM
ABDOMINAL PROX AORTA TRANS: 2.4 CM
ABDOMINAL PROX AORTA VEL: 42 CM/S
ABDOMINAL RT COM ILIAC AP: 1.1 CM
ABDOMINAL RT COM ILIAC TRANS: 1 CM
ABDOMINAL RT COM ILIAC VEL: 45 CM/S
BH CV LOWER ARTERIAL LEFT ABI RATIO: 1.05
BH CV LOWER ARTERIAL LEFT DORSALIS PEDIS SYS MAX: 156
BH CV LOWER ARTERIAL LEFT GREAT TOE SYS MAX: 113
BH CV LOWER ARTERIAL LEFT POST TIBIAL SYS MAX: 142
BH CV LOWER ARTERIAL LEFT TBI RATIO: 0.76
BH CV LOWER ARTERIAL RIGHT ABI RATIO: 1.02
BH CV LOWER ARTERIAL RIGHT DORSALIS PEDIS SYS MAX: 152
BH CV LOWER ARTERIAL RIGHT GREAT TOE SYS MAX: 99
BH CV LOWER ARTERIAL RIGHT POST TIBIAL SYS MAX: 145
BH CV LOWER ARTERIAL RIGHT TBI RATIO: 0.66
BH CV XLRA MEAS LEFT DIST CCA EDV: -26.1 CM/SEC
BH CV XLRA MEAS LEFT DIST CCA PSV: -66.5 CM/SEC
BH CV XLRA MEAS LEFT DIST ICA EDV: -31.1 CM/SEC
BH CV XLRA MEAS LEFT DIST ICA PSV: -105 CM/SEC
BH CV XLRA MEAS LEFT ICA/CCA RATIO: -1.44
BH CV XLRA MEAS LEFT PROX CCA EDV: 23.6 CM/SEC
BH CV XLRA MEAS LEFT PROX CCA PSV: 72.7 CM/SEC
BH CV XLRA MEAS LEFT PROX ECA PSV: -54.7 CM/SEC
BH CV XLRA MEAS LEFT PROX ICA EDV: -28 CM/SEC
BH CV XLRA MEAS LEFT PROX ICA PSV: -74.6 CM/SEC
BH CV XLRA MEAS LEFT PROX SCLA PSV: 84 CM/SEC
BH CV XLRA MEAS LEFT VERTEBRAL A EDV: -17.4 CM/SEC
BH CV XLRA MEAS LEFT VERTEBRAL A PSV: -59.6 CM/SEC
BH CV XLRA MEAS RIGHT DIST CCA EDV: 19.9 CM/SEC
BH CV XLRA MEAS RIGHT DIST CCA PSV: 59.6 CM/SEC
BH CV XLRA MEAS RIGHT DIST ICA EDV: -33.5 CM/SEC
BH CV XLRA MEAS RIGHT DIST ICA PSV: -85.1 CM/SEC
BH CV XLRA MEAS RIGHT ICA/CCA RATIO: -1.03
BH CV XLRA MEAS RIGHT PROX CCA EDV: 20.5 CM/SEC
BH CV XLRA MEAS RIGHT PROX CCA PSV: 82.6 CM/SEC
BH CV XLRA MEAS RIGHT PROX ECA PSV: -78.3 CM/SEC
BH CV XLRA MEAS RIGHT PROX ICA EDV: 20.5 CM/SEC
BH CV XLRA MEAS RIGHT PROX ICA PSV: 69 CM/SEC
BH CV XLRA MEAS RIGHT PROX SCLA PSV: 81.4 CM/SEC
BH CV XLRA MEAS RIGHT VERTEBRAL A EDV: -21.1 CM/SEC
BH CV XLRA MEAS RIGHT VERTEBRAL A PSV: -67.1 CM/SEC
LEFT ARM BP: 149 MMHG
MAXIMAL PREDICTED HEART RATE: 152 BPM
RIGHT ARM BP: 145 MMHG
STRESS TARGET HR: 129 BPM
UPPER ARTERIAL LEFT ARM BRACHIAL SYS MAX: 149
UPPER ARTERIAL RIGHT ARM BRACHIAL SYS MAX: 145

## 2022-05-10 PROCEDURE — 93880 EXTRACRANIAL BILAT STUDY: CPT

## 2022-05-10 PROCEDURE — 93922 UPR/L XTREMITY ART 2 LEVELS: CPT

## 2022-05-10 PROCEDURE — G0463 HOSPITAL OUTPT CLINIC VISIT: HCPCS

## 2022-05-10 PROCEDURE — 93978 VASCULAR STUDY: CPT

## 2022-05-16 DIAGNOSIS — F41.9 ANXIETY: ICD-10-CM

## 2022-05-16 RX ORDER — LORAZEPAM 1 MG/1
TABLET ORAL
Qty: 30 TABLET | Refills: 0 | Status: SHIPPED | OUTPATIENT
Start: 2022-05-16 | End: 2022-06-08 | Stop reason: SDUPTHER

## 2022-05-30 RX ORDER — HYDROCORTISONE 25 MG/G
CREAM TOPICAL 2 TIMES DAILY
Qty: 28 G | Refills: 0 | Status: SHIPPED | OUTPATIENT
Start: 2022-05-30 | End: 2022-10-02

## 2022-06-08 DIAGNOSIS — F41.9 ANXIETY: ICD-10-CM

## 2022-06-08 RX ORDER — LORAZEPAM 1 MG/1
1 TABLET ORAL DAILY PRN
Qty: 30 TABLET | Refills: 1 | Status: SHIPPED | OUTPATIENT
Start: 2022-06-08 | End: 2022-08-23

## 2022-06-13 ENCOUNTER — OFFICE VISIT (OUTPATIENT)
Dept: FAMILY MEDICINE CLINIC | Facility: CLINIC | Age: 67
End: 2022-06-13

## 2022-06-13 VITALS
HEIGHT: 66 IN | HEART RATE: 80 BPM | OXYGEN SATURATION: 98 % | BODY MASS INDEX: 17.04 KG/M2 | WEIGHT: 106 LBS | SYSTOLIC BLOOD PRESSURE: 116 MMHG | RESPIRATION RATE: 16 BRPM | DIASTOLIC BLOOD PRESSURE: 78 MMHG | TEMPERATURE: 98 F

## 2022-06-13 DIAGNOSIS — L60.9 NAIL ABNORMALITY: ICD-10-CM

## 2022-06-13 DIAGNOSIS — L98.9 SKIN LESION: Primary | ICD-10-CM

## 2022-06-13 PROCEDURE — 99213 OFFICE O/P EST LOW 20 MIN: CPT | Performed by: FAMILY MEDICINE

## 2022-06-13 NOTE — PROGRESS NOTES
Subjective   Chief Complaint   Patient presents with   • possible bug bite on chest     Shauna Marks is a 67 y.o. female.     Patient Care Team:  Talia Grey MD as PCP - General (Family Medicine)  Asa Greenwood MD as Consulting Physician (Vascular Surgery)  Yuliet Segundo MD as Consulting Physician (Hematology and Oncology)  Tasneem Macias OD (Optometry)    She is coming in today due to few concerns.  She reports that for few days she has noted a spot on the right upper chest, she was first concerned that she had a bug bite, the area was somehow swollen and she saw some drainage.  It is looking better now.  She is about to leave town and she just wanted to get this checked before she leaves town.  She also wants to talk about some issues with the nailbeds.  She has noted on her fingernails that cuticles are somehow inflamed and irritated and even puffed up on some fingernails.       The following portions of the patient's history were reviewed and updated as appropriate: allergies, current medications, past family history, past medical history, past social history, past surgical history and problem list.  Past Medical History:   Diagnosis Date   • Anxiety    • Blepharitis, right eye    • Colon polyp    • Diverticulosis    • Dry eyes    • Genital herpes    • GERD (gastroesophageal reflux disease)    • Headache    • History of transfusion    • Hyperlipidemia    • Hypertension    • IBS (irritable bowel syndrome)    • Leg cramps    • Osteoporosis    • Radiculopathy of cervical spine    • Skin disorder    • Vulva cancer (HCC)     Dr. Patel     Past Surgical History:   Procedure Laterality Date   • BREAST SURGERY      biopsies   • CARDIAC CATHETERIZATION     •  SECTION     • COLONOSCOPY     • COLONOSCOPY N/A 10/25/2021    Procedure: COLONOSCOPY WITH POLYPECTOMY X11;  Surgeon: Sudarshan Mitchell MD;  Location: [facility] ENDOSCOPY;  Service: Gastroenterology;  Laterality: N/A;  colon  "polyps, diverticulosis   • COLONOSCOPY W/ POLYPECTOMY N/A 10/28/2020    Procedure: Colonoscopy with polypectomy x 10;  Surgeon: Sudarshan Mitchell MD;  Location: [facility] ENDOSCOPY;  Service: Gastroenterology;  Laterality: N/A;  colon polyps x 10,  hemorrhoids   • HERNIA REPAIR     • LASER ABLATION CONDYLOMA CERVICAL / VULVAR     • SKIN BIOPSY     • SPLENECTOMY  2014   • SPLENECTOMY     • VARICOSE VEIN SURGERY       The patient has a family history of  Family History   Problem Relation Age of Onset   • Lung cancer Mother    • Cancer Mother    • Coronary artery disease Father    • Heart disease Father    • Hypertension Father      Social History     Socioeconomic History   • Marital status: Significant Other   Tobacco Use   • Smoking status: Light Tobacco Smoker     Packs/day: 0.50     Years: 0.00     Pack years: 0.00     Types: Cigarettes   • Smokeless tobacco: Never Used   Substance and Sexual Activity   • Alcohol use: Yes     Alcohol/week: 0.0 standard drinks     Comment: social   • Drug use: No   • Sexual activity: Not Currently     Partners: Male       Review of Systems   Constitutional: Negative for fever.   Gastrointestinal: Negative for diarrhea, nausea and vomiting.   Skin: Positive for color change and rash. Negative for wound and bruise.     Visit Vitals  /78 (BP Location: Left arm, Patient Position: Sitting, Cuff Size: Adult)   Pulse 80   Temp 98 °F (36.7 °C) (Temporal)   Resp 16   Ht 167.6 cm (65.98\")   Wt 48.1 kg (106 lb)   LMP  (LMP Unknown)   SpO2 98%   BMI 17.12 kg/m²       Current Outpatient Medications:   •  acyclovir (ZOVIRAX) 400 MG tablet, TAKE ONE TABLET BY MOUTH DAILY, Disp: 90 tablet, Rfl: 1  •  acyclovir (Zovirax) 5 % ointment, Apply 1 application topically to the appropriate area as directed Every 4 (Four) Hours. (Patient taking differently: Apply 1 application topically to the appropriate area as directed As Needed.), Disp: 15 g, Rfl: 0  •  betamethasone valerate (VALISONE) 0.1 % " ointment, As Needed., Disp: , Rfl:   •  clobetasol (TEMOVATE) 0.05 % ointment, Apply 1 application topically to the appropriate area as directed As Needed., Disp: , Rfl:   •  cycloSPORINE (RESTASIS) 0.05 % ophthalmic emulsion, Administer 1 drop to both eyes 3 (Three) Times a Day., Disp: , Rfl:   •  dicyclomine (BENTYL) 20 MG tablet, Take 1 tablet by mouth 2 (Two) Times a Day As Needed (IBS)., Disp: 60 tablet, Rfl: 0  •  Digestive Enzymes capsule, Take 1 dose by mouth 3 (Three) Times a Day., Disp: , Rfl:   •  enalapril (VASOTEC) 5 MG tablet, Take 1 tablet by mouth 2 (Two) Times a Day., Disp: 180 tablet, Rfl: 1  •  hydrocortisone 2.5 % cream, Apply 1 application topically to the appropriate area as directed As Needed., Disp: , Rfl:   •  Hydrocortisone, Perianal, (Proctozone-HC) 2.5 % rectal cream, Insert  into the rectum 2 (Two) Times a Day., Disp: 28 g, Rfl: 0  •  INV ALLIANCE, AMB, KIT, Pharmacy Label,, Take  by mouth 2 (two) times a day. Healthy bone supplement, Disp: , Rfl:   •  Lactobacillus Acid-Pectin (ACIDOPHILUS/PECTIN) capsule, Take 1 capsule by mouth Daily., Disp: , Rfl:   •  LORazepam (ATIVAN) 1 MG tablet, Take 1 tablet by mouth Daily As Needed for Anxiety., Disp: 30 tablet, Rfl: 1  •  Magnesium 200 MG tablet, Take 100 mg by mouth Daily., Disp: , Rfl:   •  Multiple Vitamins-Minerals (multivitamin with minerals) tablet tablet, Take 1 tablet by mouth Daily., Disp: , Rfl:   •  polyethylene glycol (MIRALAX) powder powder, Take 17 g by mouth As Needed., Disp: , Rfl:   •  Probiotic Product (PROBIOTIC-10 PO), Take 1 dose by mouth Daily., Disp: , Rfl:   •  rosuvastatin (CRESTOR) 5 MG tablet, Take 1 tablet by mouth every night at bedtime., Disp: 90 tablet, Rfl: 2  •  vitamin C (ASCORBIC ACID) 500 MG tablet, Take 1,000 mg by mouth Daily., Disp: , Rfl:   •  fluticasone (FLONASE) 50 MCG/ACT nasal spray, 2 sprays into the nostril(s) as directed by provider Daily for 30 days., Disp: 16 g, Rfl: 0  •  multivitamin with  minerals tablet tablet, Take 1 tablet by mouth Daily., Disp: , Rfl:     Objective   Physical Exam  Constitutional:       General: She is not in acute distress.     Appearance: Normal appearance. She is well-developed. She is not ill-appearing or diaphoretic.      Comments: Patient is in no distress, patient has normal voice and speech.  Normal respiratory effort.   HENT:      Head: Normocephalic and atraumatic.   Pulmonary:      Effort: Pulmonary effort is normal.   Musculoskeletal:      Cervical back: Normal range of motion and neck supple.   Skin:     Comments: There is a very small macular redness noted on the right upper area of the chest, no signs of infection.  No petechiae.   Neurological:      General: No focal deficit present.      Mental Status: She is alert and oriented to person, place, and time. Mental status is at baseline.   Psychiatric:         Mood and Affect: Mood normal.           Assessment & Plan   Diagnoses and all orders for this visit:    1. Skin lesion (Primary)    2. Nail abnormality      I reviewed her symptoms and concerns.  The skin lesion on the right upper chest is resolving, she may still apply topical antibiotic ointment and she will continue to monitor.  If it comes to her nail issues I suggested for her to start using over-the-counter tea tree oil to help with inflammation.  At this point I do not appreciate any signs of infection.        Return if symptoms worsen or fail to improve, for Recheck.    Requested Prescriptions      No prescriptions requested or ordered in this encounter

## 2022-07-01 ENCOUNTER — OFFICE (OUTPATIENT)
Dept: URBAN - METROPOLITAN AREA CLINIC 64 | Facility: CLINIC | Age: 67
End: 2022-07-01

## 2022-07-01 VITALS
HEART RATE: 88 BPM | WEIGHT: 105 LBS | HEIGHT: 64 IN | DIASTOLIC BLOOD PRESSURE: 81 MMHG | SYSTOLIC BLOOD PRESSURE: 120 MMHG

## 2022-07-01 DIAGNOSIS — K59.00 CONSTIPATION, UNSPECIFIED: ICD-10-CM

## 2022-07-01 DIAGNOSIS — R14.0 ABDOMINAL DISTENSION (GASEOUS): ICD-10-CM

## 2022-07-01 PROCEDURE — 99213 OFFICE O/P EST LOW 20 MIN: CPT | Performed by: INTERNAL MEDICINE

## 2022-07-01 RX ORDER — POLYETHYLENE GLYCOL 3350 17 G/17G
17 POWDER, FOR SOLUTION ORAL
Qty: 1 | Refills: 11 | Status: COMPLETED
Start: 2022-07-01 | End: 2024-06-17

## 2022-07-08 ENCOUNTER — TRANSCRIBE ORDERS (OUTPATIENT)
Dept: ADMINISTRATIVE | Facility: HOSPITAL | Age: 67
End: 2022-07-08

## 2022-07-08 DIAGNOSIS — I65.23 BILATERAL CAROTID ARTERY OCCLUSION: ICD-10-CM

## 2022-07-08 DIAGNOSIS — I73.9 PERIPHERAL VASCULAR DISEASE, UNSPECIFIED: Primary | ICD-10-CM

## 2022-07-08 DIAGNOSIS — I71.40 ABDOMINAL AORTIC ANEURYSM WITHOUT RUPTURE: ICD-10-CM

## 2022-07-11 ENCOUNTER — OFFICE VISIT (OUTPATIENT)
Dept: FAMILY MEDICINE CLINIC | Facility: CLINIC | Age: 67
End: 2022-07-11

## 2022-07-11 VITALS
OXYGEN SATURATION: 98 % | DIASTOLIC BLOOD PRESSURE: 80 MMHG | WEIGHT: 105.8 LBS | BODY MASS INDEX: 17 KG/M2 | SYSTOLIC BLOOD PRESSURE: 144 MMHG | TEMPERATURE: 97.5 F | RESPIRATION RATE: 16 BRPM | HEART RATE: 73 BPM | HEIGHT: 66 IN

## 2022-07-11 DIAGNOSIS — M25.522 PAIN OF BOTH ELBOWS: Primary | ICD-10-CM

## 2022-07-11 DIAGNOSIS — M25.521 PAIN OF BOTH ELBOWS: Primary | ICD-10-CM

## 2022-07-11 PROCEDURE — 99213 OFFICE O/P EST LOW 20 MIN: CPT | Performed by: FAMILY MEDICINE

## 2022-07-11 NOTE — PROGRESS NOTES
Subjective   Chief Complaint   Patient presents with   • Joint Swelling     Elbow bilateral/redness x 3 weeks     Shauna Marks is a 67 y.o. female.     Patient Care Team:  Talia Grey MD as PCP - General (Family Medicine)  Asa Greenwood MD as Consulting Physician (Vascular Surgery)  Yuliet Segundo MD as Consulting Physician (Hematology and Oncology)  Tasneem Macias OD (Optometry)    She is coming in today due to elbow issues.  She reports that for the last 3 weeks she has noted some redness on both elbows and she has been having some discomfort in the area.  She has tried betamethasone cream, but that does not seem to be helping and she feels that it actually makes the area dry.  She denies any trauma or fall.  She has full range of motion in her elbows.  No fever or chills reported.       The following portions of the patient's history were reviewed and updated as appropriate: allergies, current medications, past family history, past medical history, past social history, past surgical history and problem list.  Past Medical History:   Diagnosis Date   • Anxiety    • Blepharitis, right eye    • Colon polyp    • Diverticulosis    • Dry eyes    • Genital herpes    • GERD (gastroesophageal reflux disease)    • Headache    • History of transfusion    • Hyperlipidemia    • Hypertension    • IBS (irritable bowel syndrome)    • Leg cramps    • Osteoporosis    • Radiculopathy of cervical spine    • Skin disorder    • Vulva cancer (HCC)     Dr. Patel     Past Surgical History:   Procedure Laterality Date   • BREAST SURGERY      biopsies   • CARDIAC CATHETERIZATION     •  SECTION     • COLONOSCOPY     • COLONOSCOPY N/A 10/25/2021    Procedure: COLONOSCOPY WITH POLYPECTOMY X11;  Surgeon: Sudarshan Mitchell MD;  Location: [facility] ENDOSCOPY;  Service: Gastroenterology;  Laterality: N/A;  colon polyps, diverticulosis   • COLONOSCOPY W/ POLYPECTOMY N/A 10/28/2020    Procedure: Colonoscopy  "with polypectomy x 10;  Surgeon: Sudarshan Mitchell MD;  Location: [facility] ENDOSCOPY;  Service: Gastroenterology;  Laterality: N/A;  colon polyps x 10,  hemorrhoids   • HERNIA REPAIR     • LASER ABLATION CONDYLOMA CERVICAL / VULVAR     • SKIN BIOPSY     • SPLENECTOMY  2014   • SPLENECTOMY     • VARICOSE VEIN SURGERY       The patient has a family history of  Family History   Problem Relation Age of Onset   • Lung cancer Mother    • Cancer Mother    • Coronary artery disease Father    • Heart disease Father    • Hypertension Father      Social History     Socioeconomic History   • Marital status: Significant Other   Tobacco Use   • Smoking status: Light Tobacco Smoker     Packs/day: 0.50     Years: 0.00     Pack years: 0.00     Types: Cigarettes   • Smokeless tobacco: Never Used   Substance and Sexual Activity   • Alcohol use: Yes     Alcohol/week: 0.0 standard drinks     Comment: social   • Drug use: No   • Sexual activity: Not Currently     Partners: Male       Review of Systems   Constitutional: Negative for chills and fever.   Skin: Positive for color change. Negative for rash.     Visit Vitals  /80 (BP Location: Left arm, Patient Position: Sitting, Cuff Size: Adult)   Pulse 73   Temp 97.5 °F (36.4 °C)   Resp 16   Ht 167.6 cm (65.98\")   Wt 48 kg (105 lb 12.8 oz)   LMP  (LMP Unknown)   SpO2 98%   BMI 17.08 kg/m²       Current Outpatient Medications:   •  acyclovir (ZOVIRAX) 400 MG tablet, TAKE ONE TABLET BY MOUTH DAILY, Disp: 90 tablet, Rfl: 1  •  acyclovir (Zovirax) 5 % ointment, Apply 1 application topically to the appropriate area as directed Every 4 (Four) Hours. (Patient taking differently: Apply 1 application topically to the appropriate area as directed As Needed.), Disp: 15 g, Rfl: 0  •  betamethasone valerate (VALISONE) 0.1 % ointment, As Needed., Disp: , Rfl:   •  clobetasol (TEMOVATE) 0.05 % ointment, Apply 1 application topically to the appropriate area as directed As Needed., Disp: , Rfl:   •  " cycloSPORINE (RESTASIS) 0.05 % ophthalmic emulsion, Administer 1 drop to both eyes 3 (Three) Times a Day., Disp: , Rfl:   •  Diclofenac Sodium (VOLTAREN) 1 % gel gel, Apply 4 g topically to the appropriate area as directed 4 (Four) Times a Day., Disp: 100 g, Rfl: 0  •  dicyclomine (BENTYL) 20 MG tablet, Take 1 tablet by mouth 2 (Two) Times a Day As Needed (IBS)., Disp: 60 tablet, Rfl: 0  •  Digestive Enzymes capsule, Take 1 dose by mouth 3 (Three) Times a Day., Disp: , Rfl:   •  enalapril (VASOTEC) 5 MG tablet, Take 1 tablet by mouth 2 (Two) Times a Day., Disp: 180 tablet, Rfl: 1  •  fluticasone (FLONASE) 50 MCG/ACT nasal spray, 2 sprays into the nostril(s) as directed by provider Daily for 30 days., Disp: 16 g, Rfl: 0  •  hydrocortisone 2.5 % cream, Apply 1 application topically to the appropriate area as directed As Needed., Disp: , Rfl:   •  Hydrocortisone, Perianal, (Proctozone-HC) 2.5 % rectal cream, Insert  into the rectum 2 (Two) Times a Day., Disp: 28 g, Rfl: 0  •  INV ALLIANCE, AMB, KIT, Pharmacy Label,, Take  by mouth 2 (two) times a day. Healthy bone supplement, Disp: , Rfl:   •  Lactobacillus Acid-Pectin (ACIDOPHILUS/PECTIN) capsule, Take 1 capsule by mouth Daily., Disp: , Rfl:   •  LORazepam (ATIVAN) 1 MG tablet, Take 1 tablet by mouth Daily As Needed for Anxiety., Disp: 30 tablet, Rfl: 1  •  Magnesium 200 MG tablet, Take 100 mg by mouth Daily., Disp: , Rfl:   •  Multiple Vitamins-Minerals (multivitamin with minerals) tablet tablet, Take 1 tablet by mouth Daily., Disp: , Rfl:   •  multivitamin with minerals tablet tablet, Take 1 tablet by mouth Daily., Disp: , Rfl:   •  polyethylene glycol (MIRALAX) powder powder, Take 17 g by mouth As Needed., Disp: , Rfl:   •  Probiotic Product (PROBIOTIC-10 PO), Take 1 dose by mouth Daily., Disp: , Rfl:   •  rosuvastatin (CRESTOR) 5 MG tablet, Take 1 tablet by mouth every night at bedtime., Disp: 90 tablet, Rfl: 2  •  vitamin C (ASCORBIC ACID) 500 MG tablet, Take  1,000 mg by mouth Daily., Disp: , Rfl:     Objective   Physical Exam  Constitutional:       General: She is not in acute distress.     Appearance: Normal appearance. She is well-developed. She is not ill-appearing or diaphoretic.      Comments: Patient is in no distress, patient has normal voice and speech.  Normal respiratory effort.   HENT:      Head: Normocephalic and atraumatic.   Pulmonary:      Effort: Pulmonary effort is normal.   Musculoskeletal:      Cervical back: Normal range of motion and neck supple.      Comments: Both elbows were examined today, there is full range of motion noted, there is no swelling, there is some skin redness noted in both olecranon areas.  No signs of infection.   Neurological:      General: No focal deficit present.      Mental Status: She is alert and oriented to person, place, and time. Mental status is at baseline.   Psychiatric:         Mood and Affect: Mood normal.         Assessment & Plan   Diagnoses and all orders for this visit:    1. Pain of both elbows (Primary)  -     Diclofenac Sodium (VOLTAREN) 1 % gel gel; Apply 4 g topically to the appropriate area as directed 4 (Four) Times a Day.  Dispense: 100 g; Refill: 0      She already tried betamethasone cream, I advised for her to start using Cetaphil or CeraVe skin products for moisturizing the area.  I also gave her prescription for Voltaren gel to help with the inflammation and deeper tissues.          Return if symptoms worsen or fail to improve, for Recheck.    Requested Prescriptions     Signed Prescriptions Disp Refills   • Diclofenac Sodium (VOLTAREN) 1 % gel gel 100 g 0     Sig: Apply 4 g topically to the appropriate area as directed 4 (Four) Times a Day.

## 2022-07-13 DIAGNOSIS — M25.521 PAIN OF BOTH ELBOWS: ICD-10-CM

## 2022-07-13 DIAGNOSIS — M25.522 PAIN OF BOTH ELBOWS: ICD-10-CM

## 2022-08-12 RX ORDER — ENALAPRIL MALEATE 5 MG/1
5 TABLET ORAL 2 TIMES DAILY
Qty: 180 TABLET | Refills: 0 | Status: SHIPPED | OUTPATIENT
Start: 2022-08-12 | End: 2022-12-20

## 2022-08-12 NOTE — TELEPHONE ENCOUNTER
Caller: Shauna Marks    Relationship: Self    Best call back number: [phone]    Requested Prescriptions:   Requested Prescriptions     Pending Prescriptions Disp Refills   • enalapril (VASOTEC) 5 MG tablet 180 tablet 1     Sig: Take 1 tablet by mouth 2 (Two) Times a Day.        Pharmacy where request should be sent: [facility] [address] [place], [place] [phone] [place], [place] [phone] FX     Additional details provided by patient: PATIENT IS OUT OF TOWN AND NEEDS MEDICATION    Does the patient have less than a 3 day supply:  [x] Yes  [] No    Mala Borjas Rep   08/12/22 13:53 EDT

## 2022-08-23 DIAGNOSIS — F41.9 ANXIETY: ICD-10-CM

## 2022-08-23 RX ORDER — LORAZEPAM 1 MG/1
TABLET ORAL
Qty: 30 TABLET | Refills: 0 | Status: SHIPPED | OUTPATIENT
Start: 2022-08-23 | End: 2022-09-22

## 2022-09-22 DIAGNOSIS — F41.9 ANXIETY: ICD-10-CM

## 2022-09-22 RX ORDER — LORAZEPAM 1 MG/1
TABLET ORAL
Qty: 30 TABLET | Refills: 0 | Status: SHIPPED | OUTPATIENT
Start: 2022-09-22 | End: 2022-10-23

## 2022-09-23 ENCOUNTER — TELEPHONE (OUTPATIENT)
Dept: FAMILY MEDICINE CLINIC | Facility: CLINIC | Age: 67
End: 2022-09-23

## 2022-09-23 DIAGNOSIS — E55.9 VITAMIN D DEFICIENCY: ICD-10-CM

## 2022-09-23 DIAGNOSIS — I73.9 PERIPHERAL ARTERIAL DISEASE: ICD-10-CM

## 2022-09-23 DIAGNOSIS — E78.5 DYSLIPIDEMIA: ICD-10-CM

## 2022-09-23 DIAGNOSIS — I10 ESSENTIAL HYPERTENSION: Primary | ICD-10-CM

## 2022-09-23 NOTE — TELEPHONE ENCOUNTER
PT IS COMING IN Monday FOR LABS BEFORE HER MEDICARE WELLNESS. SHE WOULD ALSO LIKE HER VIT D DONE. THERE ARE NO ORDERS IN THE CHART.

## 2022-09-25 DIAGNOSIS — R73.9 HYPERGLYCEMIA: Primary | ICD-10-CM

## 2022-09-26 ENCOUNTER — LAB (OUTPATIENT)
Dept: FAMILY MEDICINE CLINIC | Facility: CLINIC | Age: 67
End: 2022-09-26

## 2022-09-26 LAB
25(OH)D3 SERPL-MCNC: 56.5 NG/ML (ref 30–100)
ALBUMIN SERPL-MCNC: 5 G/DL (ref 3.5–5.2)
ALBUMIN/GLOB SERPL: 1.6 G/DL
ALP SERPL-CCNC: 78 U/L (ref 39–117)
ALT SERPL W P-5'-P-CCNC: 16 U/L (ref 1–33)
ANION GAP SERPL CALCULATED.3IONS-SCNC: 14.5 MMOL/L (ref 5–15)
AST SERPL-CCNC: 27 U/L (ref 1–32)
BASOPHILS # BLD AUTO: 0.11 10*3/MM3 (ref 0–0.2)
BASOPHILS NFR BLD AUTO: 0.8 % (ref 0–1.5)
BILIRUB SERPL-MCNC: 0.5 MG/DL (ref 0–1.2)
BUN SERPL-MCNC: 11 MG/DL (ref 8–23)
BUN/CREAT SERPL: 12.9 (ref 7–25)
CALCIUM SPEC-SCNC: 9.9 MG/DL (ref 8.6–10.5)
CHLORIDE SERPL-SCNC: 96 MMOL/L (ref 98–107)
CHOLEST SERPL-MCNC: 137 MG/DL (ref 0–200)
CO2 SERPL-SCNC: 26.5 MMOL/L (ref 22–29)
CREAT SERPL-MCNC: 0.85 MG/DL (ref 0.57–1)
DEPRECATED RDW RBC AUTO: 45.2 FL (ref 37–54)
EGFRCR SERPLBLD CKD-EPI 2021: 74.7 ML/MIN/1.73
EOSINOPHIL # BLD AUTO: 0.15 10*3/MM3 (ref 0–0.4)
EOSINOPHIL NFR BLD AUTO: 1.1 % (ref 0.3–6.2)
ERYTHROCYTE [DISTWIDTH] IN BLOOD BY AUTOMATED COUNT: 12.5 % (ref 12.3–15.4)
GLOBULIN UR ELPH-MCNC: 3.2 GM/DL
GLUCOSE SERPL-MCNC: 93 MG/DL (ref 65–99)
HBA1C MFR BLD: 5.9 % (ref 3.5–5.6)
HCT VFR BLD AUTO: 44.7 % (ref 34–46.6)
HDLC SERPL-MCNC: 60 MG/DL (ref 40–60)
HGB BLD-MCNC: 14.8 G/DL (ref 12–15.9)
IMM GRANULOCYTES # BLD AUTO: 0.05 10*3/MM3 (ref 0–0.05)
IMM GRANULOCYTES NFR BLD AUTO: 0.4 % (ref 0–0.5)
LDLC SERPL CALC-MCNC: 63 MG/DL (ref 0–100)
LDLC/HDLC SERPL: 1.06 {RATIO}
LYMPHOCYTES # BLD AUTO: 3.04 10*3/MM3 (ref 0.7–3.1)
LYMPHOCYTES NFR BLD AUTO: 22.4 % (ref 19.6–45.3)
MCH RBC QN AUTO: 32.4 PG (ref 26.6–33)
MCHC RBC AUTO-ENTMCNC: 33.1 G/DL (ref 31.5–35.7)
MCV RBC AUTO: 97.8 FL (ref 79–97)
MONOCYTES # BLD AUTO: 0.97 10*3/MM3 (ref 0.1–0.9)
MONOCYTES NFR BLD AUTO: 7.1 % (ref 5–12)
NEUTROPHILS NFR BLD AUTO: 68.2 % (ref 42.7–76)
NEUTROPHILS NFR BLD AUTO: 9.27 10*3/MM3 (ref 1.7–7)
NRBC BLD AUTO-RTO: 0 /100 WBC (ref 0–0.2)
PLATELET # BLD AUTO: 269 10*3/MM3 (ref 140–450)
PMV BLD AUTO: 13.2 FL (ref 6–12)
POTASSIUM SERPL-SCNC: 3.7 MMOL/L (ref 3.5–5.2)
PROT SERPL-MCNC: 8.2 G/DL (ref 6–8.5)
RBC # BLD AUTO: 4.57 10*6/MM3 (ref 3.77–5.28)
SODIUM SERPL-SCNC: 137 MMOL/L (ref 136–145)
TRIGL SERPL-MCNC: 68 MG/DL (ref 0–150)
TSH SERPL DL<=0.05 MIU/L-ACNC: 1.75 UIU/ML (ref 0.27–4.2)
VLDLC SERPL-MCNC: 14 MG/DL (ref 5–40)
WBC NRBC COR # BLD: 13.59 10*3/MM3 (ref 3.4–10.8)

## 2022-09-26 PROCEDURE — 82306 VITAMIN D 25 HYDROXY: CPT | Performed by: FAMILY MEDICINE

## 2022-09-26 PROCEDURE — 36415 COLL VENOUS BLD VENIPUNCTURE: CPT | Performed by: FAMILY MEDICINE

## 2022-09-26 PROCEDURE — 80061 LIPID PANEL: CPT | Performed by: FAMILY MEDICINE

## 2022-09-26 PROCEDURE — 83036 HEMOGLOBIN GLYCOSYLATED A1C: CPT | Performed by: FAMILY MEDICINE

## 2022-09-26 PROCEDURE — 84443 ASSAY THYROID STIM HORMONE: CPT | Performed by: FAMILY MEDICINE

## 2022-09-26 PROCEDURE — 80053 COMPREHEN METABOLIC PANEL: CPT | Performed by: FAMILY MEDICINE

## 2022-09-26 PROCEDURE — 85025 COMPLETE CBC W/AUTO DIFF WBC: CPT | Performed by: FAMILY MEDICINE

## 2022-10-02 RX ORDER — HYDROCORTISONE 25 MG/G
CREAM TOPICAL
Qty: 30 G | Refills: 0 | Status: SHIPPED | OUTPATIENT
Start: 2022-10-02

## 2022-10-10 ENCOUNTER — OFFICE VISIT (OUTPATIENT)
Dept: FAMILY MEDICINE CLINIC | Facility: CLINIC | Age: 67
End: 2022-10-10

## 2022-10-10 VITALS
RESPIRATION RATE: 16 BRPM | DIASTOLIC BLOOD PRESSURE: 79 MMHG | WEIGHT: 104.2 LBS | OXYGEN SATURATION: 98 % | SYSTOLIC BLOOD PRESSURE: 147 MMHG | HEIGHT: 66 IN | HEART RATE: 90 BPM | TEMPERATURE: 97.5 F | BODY MASS INDEX: 16.74 KG/M2

## 2022-10-10 DIAGNOSIS — Z23 NEED FOR VACCINATION: ICD-10-CM

## 2022-10-10 DIAGNOSIS — Z00.00 MEDICARE ANNUAL WELLNESS VISIT, SUBSEQUENT: Primary | ICD-10-CM

## 2022-10-10 DIAGNOSIS — K76.89 LIVER CYST: ICD-10-CM

## 2022-10-10 DIAGNOSIS — Z12.31 VISIT FOR SCREENING MAMMOGRAM: ICD-10-CM

## 2022-10-10 DIAGNOSIS — Z11.59 NEED FOR HEPATITIS C SCREENING TEST: ICD-10-CM

## 2022-10-10 PROBLEM — J06.9 ACUTE URI: Status: RESOLVED | Noted: 2022-01-14 | Resolved: 2022-10-10

## 2022-10-10 PROCEDURE — 1126F AMNT PAIN NOTED NONE PRSNT: CPT | Performed by: FAMILY MEDICINE

## 2022-10-10 PROCEDURE — 90686 IIV4 VACC NO PRSV 0.5 ML IM: CPT | Performed by: FAMILY MEDICINE

## 2022-10-10 PROCEDURE — 1170F FXNL STATUS ASSESSED: CPT | Performed by: FAMILY MEDICINE

## 2022-10-10 PROCEDURE — 1159F MED LIST DOCD IN RCRD: CPT | Performed by: FAMILY MEDICINE

## 2022-10-10 PROCEDURE — G0008 ADMIN INFLUENZA VIRUS VAC: HCPCS | Performed by: FAMILY MEDICINE

## 2022-10-10 PROCEDURE — G0439 PPPS, SUBSEQ VISIT: HCPCS | Performed by: FAMILY MEDICINE

## 2022-10-10 NOTE — PROGRESS NOTES
Subsequent Medicare Wellness Visit   The ABC's of the Annual Wellness Visit    Chief Complaint   Patient presents with   • Medicare Wellness-subsequent       HPI:  Shauna Marks, -1955, is a 67 y.o. female who presents for a Subsequent Medicare Wellness Visit.  She lives with her significant other.  She is fully independent with her activities of daily living.  She is trying to stay active, she walks on a regular basis.  She denies any memory problems, she has been feeling however somehow depressed and stressed.  She would like to review her recent fasting blood work results. Patient does not report any chest pain, shortness of breath, dizziness, nausea, vomiting, or diarrhea, visual issues, headaches, numbness or tingling. No urinary issues reported like urgency, frequency, or discomfort upon urination.  No significant weight changes reported.  No swelling reported.  No rashes or any other skin issues reported. No emotional issues or insomnia.    Recent Hospitalizations:  No hospitalization(s) within the last year..    Current Medical Providers:  Patient Care Team:  Talia Grey MD as PCP - General (Family Medicine)  Asa Greenwood MD as Consulting Physician (Vascular Surgery)  Yuliet Segundo MD as Consulting Physician (Hematology and Oncology)  Tasneem Macias OD (Optometry)    Health Habits and Functional and Cognitive Screening and Depression Screening:  Functional & Cognitive Status 10/10/2022   Do you have difficulty preparing food and eating? No   Do you have difficulty bathing yourself, getting dressed or grooming yourself? No   Do you have difficulty using the toilet? No   Do you have difficulty moving around from place to place? No   Do you have trouble with steps or getting out of a bed or a chair? No   Current Diet Well Balanced Diet   Dental Exam Up to date   Eye Exam Up to date   Exercise (times per week) 2 times per week   Current Exercises Include Walking   Current Exercise  "Activities Include -   Do you need help using the phone?  No   Are you deaf or do you have serious difficulty hearing?  No   Do you need help with transportation? No   Do you need help shopping? No   Do you need help preparing meals?  No   Do you need help with housework?  No   Do you need help with laundry? No   Do you need help taking your medications? No   Do you need help managing money? No   Do you ever drive or ride in a car without wearing a seat belt? No   Have you felt unusual stress, anger or loneliness in the last month? No   Who do you live with? Spouse   If you need help, do you have trouble finding someone available to you? No   Have you been bothered in the last four weeks by sexual problems? -   Do you have difficulty concentrating, remembering or making decisions? No       Compared to one year ago, the patient feels her physical health is the same and her mental health is the same.    Depression Screen:  PHQ-2/PHQ-9 Depression Screening 10/10/2022   Retired PHQ-9 Total Score -   Retired Total Score -   Little Interest or Pleasure in Doing Things 0-->not at all   Feeling Down, Depressed or Hopeless 0-->not at all   PHQ-9: Brief Depression Severity Measure Score 0         Past Medical/Family/Social History:  The following portions of the patient's history were reviewed and updated as appropriate: allergies, current medications, past family history, past medical history, past social history, past surgical history and problem list.    Allergies   Allergen Reactions   • Amoxicillin Hives and Swelling     Hives, swelling   • Amoxicillin-Pot Clavulanate Anaphylaxis   • Cefoxitin Other (See Comments)     \"blood pressure bottoms out\"   • Ciprofloxacin Rash   • Contrast Dye Hives, Nausea And Vomiting and Shortness Of Breath   • Hydromorphone Confusion and Other (See Comments)   • Hydromorphone Hcl Hallucinations and Rash   • Iodinated Diagnostic Agents Rash   • Propafenone Other (See Comments)     (Rythmol) "   Facial numbness   • Azithromycin Unknown - High Severity   • Cefuroxime GI Intolerance   • Cephalexin Unknown - High Severity   • Fentanyl Other (See Comments)     Facial numbness     • Sulfa Antibiotics Nausea And Vomiting   • Sulfacetamide Nausea And Vomiting   • Cephalosporins Rash   • Latex Rash         Current Outpatient Medications:   •  acyclovir (ZOVIRAX) 400 MG tablet, TAKE ONE TABLET BY MOUTH DAILY, Disp: 90 tablet, Rfl: 1  •  acyclovir (Zovirax) 5 % ointment, Apply 1 application topically to the appropriate area as directed Every 4 (Four) Hours. (Patient taking differently: Apply 1 application topically to the appropriate area as directed As Needed.), Disp: 15 g, Rfl: 0  •  betamethasone valerate (VALISONE) 0.1 % ointment, As Needed., Disp: , Rfl:   •  clobetasol (TEMOVATE) 0.05 % ointment, Apply 1 application topically to the appropriate area as directed As Needed., Disp: , Rfl:   •  cycloSPORINE (RESTASIS) 0.05 % ophthalmic emulsion, Administer 1 drop to both eyes 3 (Three) Times a Day., Disp: , Rfl:   •  Diclofenac Sodium (VOLTAREN) 1 % gel gel, APPLY FOUR GRAMS TO THE APPROPRIATE AREA AS DIRECTED FOUR TIMES A DAY, Disp: 100 g, Rfl: 0  •  dicyclomine (BENTYL) 20 MG tablet, Take 1 tablet by mouth 2 (Two) Times a Day As Needed (IBS)., Disp: 60 tablet, Rfl: 0  •  Digestive Enzymes capsule, Take 1 dose by mouth 3 (Three) Times a Day., Disp: , Rfl:   •  enalapril (VASOTEC) 5 MG tablet, Take 1 tablet by mouth 2 (Two) Times a Day., Disp: 180 tablet, Rfl: 0  •  hydrocortisone 2.5 % cream, Apply 1 application topically to the appropriate area as directed As Needed., Disp: , Rfl:   •  Hydrocortisone, Perianal, (ANUSOL-HC) 2.5 % rectal cream, INSERT RECTALLY TWO TIMES A DAY AS DIRECTED, Disp: 30 g, Rfl: 0  •  LORazepam (ATIVAN) 1 MG tablet, TAKE ONE TABLET BY MOUTH DAILY AS NEEDED FOR ANXIETY, Disp: 30 tablet, Rfl: 0  •  Magnesium 200 MG tablet, Take 100 mg by mouth Daily., Disp: , Rfl:   •  Multiple  Vitamins-Minerals (multivitamin with minerals) tablet tablet, Take 1 tablet by mouth Daily., Disp: , Rfl:   •  polyethylene glycol (MIRALAX) powder powder, Take 17 g by mouth As Needed., Disp: , Rfl:   •  Probiotic Product (PROBIOTIC-10 PO), Take 1 dose by mouth Daily., Disp: , Rfl:   •  rosuvastatin (CRESTOR) 5 MG tablet, Take 1 tablet by mouth every night at bedtime., Disp: 90 tablet, Rfl: 2  •  vitamin C (ASCORBIC ACID) 500 MG tablet, Take 1,000 mg by mouth Daily., Disp: , Rfl:   •  fluticasone (FLONASE) 50 MCG/ACT nasal spray, 2 sprays into the nostril(s) as directed by provider Daily for 30 days., Disp: 16 g, Rfl: 0    Aspirin use counseling: Does not need ASA (and currently is not on it)    Current medication list contains no high risk medications.  No harmful drug interactions have been identified.     Family History   Problem Relation Age of Onset   • Lung cancer Mother    • Cancer Mother    • Coronary artery disease Father    • Heart disease Father    • Hypertension Father        Social History     Tobacco Use   • Smoking status: Light Smoker     Packs/day: 0.50     Years: 0.00     Pack years: 0.00     Types: Cigarettes   • Smokeless tobacco: Never   Substance Use Topics   • Alcohol use: Yes     Alcohol/week: 0.0 standard drinks     Comment: social       Past Surgical History:   Procedure Laterality Date   • BREAST SURGERY      biopsies   • CARDIAC CATHETERIZATION     •  SECTION     • COLONOSCOPY     • COLONOSCOPY N/A 10/25/2021    Procedure: COLONOSCOPY WITH POLYPECTOMY X11;  Surgeon: Sudarshan Mitchell MD;  Location: [facility] ENDOSCOPY;  Service: Gastroenterology;  Laterality: N/A;  colon polyps, diverticulosis   • COLONOSCOPY W/ POLYPECTOMY N/A 10/28/2020    Procedure: Colonoscopy with polypectomy x 10;  Surgeon: Sudarshan Mitchell MD;  Location: [facility] ENDOSCOPY;  Service: Gastroenterology;  Laterality: N/A;  colon polyps x 10,  hemorrhoids   • HERNIA REPAIR     • LASER ABLATION  CONDYLOMA CERVICAL / VULVAR     • SKIN BIOPSY     • SPLENECTOMY  2014   • SPLENECTOMY     • VARICOSE VEIN SURGERY         Patient Active Problem List   Diagnosis   • Anxiety about health   • Carcinoma in situ of vulva   • Dyspareunia, female   • Chronic nonintractable headache   • Hypertrophic scar of skin   • Irritable bowel syndrome with both constipation and diarrhea   • Vaginal dryness, menopausal   • Weight decreased   • Varicose veins of lower extremity   • OAB (overactive bladder)   • Leukocytosis   • History of splenectomy   • Herpesvirus infection   • Essential hypertension   • Abdominal aortic aneurysm (AAA)   • Sphincter of Oddi dysfunction   • Osteoporosis   • Goiter   • GERD (gastroesophageal reflux disease)   • Genital herpes   • Dry eyes   • Diverticulosis   • Peripheral arterial disease (HCC)   • Atherosclerosis   • Upper back pain on right side   • Medicare annual wellness visit, subsequent   • Vitamin D deficiency   • Visit for screening mammogram   • Postmenopausal   • Hair loss   • Anxiety   • Tobacco dependence   • Dyslipidemia   • Bilateral carotid artery disease (HCC)   • Need for vaccination   • Vulvar carcinoma (HCC)   • Liver cyst   • Skin lesion   • Weight loss   • Prediabetes   • Acute pain of left shoulder   • Stress   • New daily persistent headache   • Nail abnormality   • Neck pain   • Pain of both elbows   • Need for hepatitis C screening test       Review of Systems   Constitutional: Negative for activity change, fatigue and fever.   Respiratory: Negative for shortness of breath and wheezing.    Cardiovascular: Negative for chest pain, palpitations and leg swelling.   Musculoskeletal: Negative for arthralgias and back pain.   Skin: Negative for rash.   Neurological: Negative for tremors and headache.   Psychiatric/Behavioral: Positive for stress.       Objective     Vitals:    10/10/22 1322   BP: 147/79   BP Location: Left arm   Patient Position: Sitting   Cuff Size: Adult   Pulse:  "90   Resp: 16   Temp: 97.5 °F (36.4 °C)   TempSrc: Temporal   SpO2: 98%   Weight: 47.3 kg (104 lb 3.2 oz)   Height: 167.6 cm (65.98\")   PainSc: 0-No pain       BMI is below normal parameters (malnutrition). Recommendations: healthy diet      No results found.    The patient has no evidence of cognitve impairment.     Physical Exam  Vitals and nursing note reviewed.   Constitutional:       General: She is not in acute distress.     Appearance: Normal appearance. She is well-developed. She is not ill-appearing.   HENT:      Head: Normocephalic and atraumatic.   Cardiovascular:      Rate and Rhythm: Normal rate and regular rhythm.      Heart sounds: Normal heart sounds. No murmur heard.    No gallop.   Pulmonary:      Effort: Pulmonary effort is normal. No respiratory distress.      Breath sounds: Normal breath sounds. No wheezing, rhonchi or rales.   Chest:      Chest wall: No tenderness.   Musculoskeletal:      Cervical back: Normal range of motion and neck supple.   Neurological:      General: No focal deficit present.      Mental Status: She is alert and oriented to person, place, and time. Mental status is at baseline.   Psychiatric:         Mood and Affect: Mood normal.         Recent Lab Results:  Lab Results   Component Value Date     (H) 05/21/2020     Lab Results   Component Value Date    CHOL 137 09/26/2022    TRIG 68 09/26/2022    HDL 60 09/26/2022    VLDL 14 09/26/2022    LDLHDL 1.06 09/26/2022       Assessment & Plan   Age-appropriate Screening Schedule:  Refer to the list below for future screening recommendations based on patient's age, sex and/or medical conditions.      Health Maintenance   Topic Date Due   • DXA SCAN  04/12/2023   • LIPID PANEL  09/26/2023   • MAMMOGRAM  11/10/2023   • TDAP/TD VACCINES (2 - Tdap) 04/14/2031   • INFLUENZA VACCINE  Completed   • ZOSTER VACCINE  Completed       Medicare Risks and Personalized Health Plan:  Advance Directive Discussion  Breast Cancer/Mammogram " Screening  Cardiovascular risk  Colon Cancer Screening  Dementia/Memory   Depression/Dysphoria  Diabetic Lab Screening   Fall Risk  Immunizations Discussed/Encouraged (specific immunizations; Influenza and COVID19 )  Osteoporosis Risk      CMS-Preventive Services Quick Reference  Medicare Preventive Services Addressed:  Annual Wellness Visit (AWV)  Bone Density Measurements  Cardiovascular Disease Screening Tests (may do this order every 5 years in beneficiaries without signs or symptoms of cardiovascular disease)  Colorectal Cancer Screening, Colonoscopy  Screening Mammography     Advance Care Planning:  ACP discussion was held with the patient during this visit. Patient has an advance directive in EMR which is still valid.     Diagnoses and all orders for this visit:    1. Medicare annual wellness visit, subsequent (Primary)    2. Visit for screening mammogram  -     Mammo Screening Digital Tomosynthesis Bilateral With CAD; Future    3. Need for hepatitis C screening test  -     Hepatitis C Antibody    4. Liver cyst  -     US Liver; Future    5. Need for vaccination  -     FluLaval/Fluzone >6 mos ([id number])      Medicare wellness exam was done today.  I reviewed her recent fasting blood work results and discussed these with the patient.  I also reviewed her health maintenance.  Her last colonoscopy was in 10/2021 and 3-year follow-up was recommended.  Her last mammogram was in 11/2021 and new order was given and she will be scheduling the test when due.  Her last DEXA scan was in 4/2021 and showed bone loss in osteoporosis range.  Patient does not want to be started on any prescription medication for osteoporosis.  She is to continue calcium and vitamin D supplementation.  I reviewed her immunization and she was given flu shot today, she did not want to get high-dose flu shot and rather just regular shot due to some possible side effects per patient report.  She is up to speed with her pneumonia shots, shingle  shot, and COVID-19 vaccination.  Healthy lifestyle was reinforced.    An After Visit Summary and PPPS with all of these plans were given to the patient.      Follow Up:  Return in about 1 year (around 10/10/2023) for Medicare Wellness.        Requested Prescriptions      No prescriptions requested or ordered in this encounter

## 2022-10-17 DIAGNOSIS — Z87.891 PERSONAL HISTORY OF NICOTINE DEPENDENCE: ICD-10-CM

## 2022-10-17 DIAGNOSIS — F17.200 TOBACCO DEPENDENCE: Primary | ICD-10-CM

## 2022-10-19 ENCOUNTER — OFFICE (OUTPATIENT)
Dept: URBAN - METROPOLITAN AREA CLINIC 64 | Facility: CLINIC | Age: 67
End: 2022-10-19

## 2022-10-19 VITALS
WEIGHT: 107 LBS | HEIGHT: 64 IN | DIASTOLIC BLOOD PRESSURE: 93 MMHG | SYSTOLIC BLOOD PRESSURE: 128 MMHG | HEART RATE: 86 BPM

## 2022-10-19 DIAGNOSIS — R14.0 ABDOMINAL DISTENSION (GASEOUS): ICD-10-CM

## 2022-10-19 DIAGNOSIS — I70.90 ATHEROSCLEROSIS: ICD-10-CM

## 2022-10-19 DIAGNOSIS — I73.9 PERIPHERAL ARTERIAL DISEASE: ICD-10-CM

## 2022-10-19 DIAGNOSIS — K30 FUNCTIONAL DYSPEPSIA: ICD-10-CM

## 2022-10-19 PROCEDURE — 99213 OFFICE O/P EST LOW 20 MIN: CPT

## 2022-10-19 RX ORDER — ACYCLOVIR 400 MG/1
TABLET ORAL
Qty: 90 TABLET | Refills: 1 | Status: SHIPPED | OUTPATIENT
Start: 2022-10-19

## 2022-10-19 RX ORDER — ROSUVASTATIN CALCIUM 5 MG/1
TABLET, COATED ORAL
Qty: 90 TABLET | Refills: 2 | Status: SHIPPED | OUTPATIENT
Start: 2022-10-19

## 2022-10-22 DIAGNOSIS — F41.9 ANXIETY: ICD-10-CM

## 2022-10-23 RX ORDER — LORAZEPAM 1 MG/1
TABLET ORAL
Qty: 30 TABLET | Refills: 0 | Status: SHIPPED | OUTPATIENT
Start: 2022-10-23 | End: 2022-11-23

## 2022-10-26 DIAGNOSIS — J06.9 ACUTE URI: ICD-10-CM

## 2022-10-26 RX ORDER — FLUTICASONE PROPIONATE 50 MCG
SPRAY, SUSPENSION (ML) NASAL
Qty: 16 ML | Refills: 2 | Status: SHIPPED | OUTPATIENT
Start: 2022-10-26

## 2022-11-22 DIAGNOSIS — K76.89 HEPATIC CYST: Primary | ICD-10-CM

## 2022-11-23 DIAGNOSIS — F41.9 ANXIETY: ICD-10-CM

## 2022-11-23 RX ORDER — LORAZEPAM 1 MG/1
TABLET ORAL
Qty: 30 TABLET | Refills: 1 | Status: SHIPPED | OUTPATIENT
Start: 2022-11-23 | End: 2022-12-30

## 2022-12-01 ENCOUNTER — TELEPHONE (OUTPATIENT)
Dept: FAMILY MEDICINE CLINIC | Facility: CLINIC | Age: 67
End: 2022-12-01

## 2022-12-01 NOTE — TELEPHONE ENCOUNTER
PATIENT DROPPED OFF RECORDS FOR IMAGING. THEY ARE SCANNED INTO HER ACCT IN MEDIA UNDER EXTERNAL MEDICAL RECORDS. SHE ASKED TO HAVE YOU LOOK OVER THESE AND THEN CALL HER TO DISCUSS. [phone]

## 2022-12-20 RX ORDER — ENALAPRIL MALEATE 5 MG/1
TABLET ORAL
Qty: 180 TABLET | Refills: 1 | Status: SHIPPED | OUTPATIENT
Start: 2022-12-20

## 2022-12-29 DIAGNOSIS — F41.9 ANXIETY: ICD-10-CM

## 2022-12-30 RX ORDER — LORAZEPAM 1 MG/1
TABLET ORAL
Qty: 30 TABLET | Refills: 0 | Status: SHIPPED | OUTPATIENT
Start: 2022-12-30 | End: 2023-02-21

## 2023-01-16 ENCOUNTER — OFFICE VISIT (OUTPATIENT)
Dept: FAMILY MEDICINE CLINIC | Facility: CLINIC | Age: 68
End: 2023-01-16
Payer: MEDICARE

## 2023-01-16 VITALS
TEMPERATURE: 98.4 F | WEIGHT: 107 LBS | RESPIRATION RATE: 16 BRPM | BODY MASS INDEX: 18.27 KG/M2 | HEART RATE: 83 BPM | DIASTOLIC BLOOD PRESSURE: 85 MMHG | OXYGEN SATURATION: 98 % | HEIGHT: 64 IN | SYSTOLIC BLOOD PRESSURE: 158 MMHG

## 2023-01-16 DIAGNOSIS — L08.9 INFECTED INCLUSION CYST: Primary | ICD-10-CM

## 2023-01-16 DIAGNOSIS — L72.0 INFECTED INCLUSION CYST: Primary | ICD-10-CM

## 2023-01-16 PROCEDURE — 99213 OFFICE O/P EST LOW 20 MIN: CPT | Performed by: FAMILY MEDICINE

## 2023-01-16 RX ORDER — DOXYCYCLINE HYCLATE 50 MG/1
50 CAPSULE ORAL 2 TIMES DAILY
Qty: 14 CAPSULE | Refills: 0 | Status: SHIPPED | OUTPATIENT
Start: 2023-01-16

## 2023-01-16 NOTE — PROGRESS NOTES
Subjective   Chief Complaint   Patient presents with   • spot on right buttocks     Noticed on Saturday     Shauna Marks is a 67 y.o. female.     Patient Care Team:  Talia Grey MD as PCP - General (Family Medicine)  Asa Greenwood MD as Consulting Physician (Vascular Surgery)  Yuliet Segundo MD as Consulting Physician (Hematology and Oncology)  Tasneem Macias, OD (Optometry)    History of Present Illness  She is coming in today due to a sore spot which she noted on her bottom over the weekend.  She tells me that she has had a small cyst in that area for many years, they have never really cause any discomfort.  She noted some mild pinkish drainage from that area over the weekend.  No fever or chills are being reported.  She has used some warm compresses.  She has a intolerance/allergy to multiple medications including multiple antibiotics.  She was recently on doxycycline which was prescribed through her eye doctor after the eyelid surgery.  She believes it was in a tablet form and she had to discontinue after about 5 days due to some GI upset.  She tells me that she was on doxycycline about 3 years ago and she believes at that time it was capsules and she did not have side effects to that.       The following portions of the patient's history were reviewed and updated as appropriate: allergies, current medications, past family history, past medical history, past social history, past surgical history and problem list.  Past Medical History:   Diagnosis Date   • Allergic    • Anxiety    • Blepharitis, right eye    • Colon polyp    • Diverticulosis    • Dry eyes    • Genital herpes    • GERD (gastroesophageal reflux disease)    • Headache    • History of transfusion    • Hyperlipidemia    • Hypertension    • IBS (irritable bowel syndrome)    • Leg cramps    • Osteopenia 2015   • Osteoporosis    • Pneumonia 2019   • Radiculopathy of cervical spine    • Skin disorder    • Visual impairment 2021   •  "Vulva cancer (HCC) 2014    Dr. Patel     Past Surgical History:   Procedure Laterality Date   • BREAST SURGERY      biopsies   • CARDIAC CATHETERIZATION     •  SECTION     • COLONOSCOPY     • COLONOSCOPY N/A 10/25/2021    Procedure: COLONOSCOPY WITH POLYPECTOMY X11;  Surgeon: Sudarshan Mitchell MD;  Location: [facility];  Service: Gastroenterology;  Laterality: N/A;  colon polyps, diverticulosis   • COLONOSCOPY W/ POLYPECTOMY N/A 10/28/2020    Procedure: Colonoscopy with polypectomy x 10;  Surgeon: Sudarshan Mitchell MD;  Location: [facility];  Service: Gastroenterology;  Laterality: N/A;  colon polyps x 10,  hemorrhoids   • COSMETIC SURGERY      Ptosis   • HERNIA REPAIR     • LASER ABLATION CONDYLOMA CERVICAL / VULVAR     • SKIN BIOPSY     • SPLENECTOMY     • SPLENECTOMY     • VARICOSE VEIN SURGERY       The patient has a family history of  Family History   Problem Relation Age of Onset   • Lung cancer Mother    • Cancer Mother    • Coronary artery disease Father    • Heart disease Father    • Hypertension Father    • Early death Father      Social History     Socioeconomic History   • Marital status: Significant Other   Tobacco Use   • Smoking status: Light Smoker     Packs/day: 0.50     Years: 0.00     Pack years: 0.00     Types: Cigarettes   • Smokeless tobacco: Never   Substance and Sexual Activity   • Alcohol use: Yes     Alcohol/week: 5.0 standard drinks     Types: 5 Glasses of wine per week     Comment: social   • Drug use: No   • Sexual activity: Not Currently     Partners: Male       Review of Systems   Skin: Positive for color change and skin lesions.     Visit Vitals  /85 (BP Location: Left arm, Patient Position: Sitting, Cuff Size: Adult)   Pulse 83   Temp 98.4 °F (36.9 °C) (Temporal)   Resp 16   Ht 162.6 cm (64\")   Wt 48.5 kg (107 lb)   LMP  (LMP Unknown)   SpO2 98%   BMI 18.37 kg/m²       Current Outpatient Medications:   •  acyclovir (ZOVIRAX) 400 MG " tablet, TAKE ONE TABLET BY MOUTH DAILY, Disp: 90 tablet, Rfl: 1  •  acyclovir (Zovirax) 5 % ointment, Apply 1 application topically to the appropriate area as directed Every 4 (Four) Hours. (Patient taking differently: Apply 1 application topically to the appropriate area as directed As Needed.), Disp: 15 g, Rfl: 0  •  betamethasone valerate (VALISONE) 0.1 % ointment, As Needed., Disp: , Rfl:   •  clobetasol (TEMOVATE) 0.05 % ointment, Apply 1 application topically to the appropriate area as directed As Needed., Disp: , Rfl:   •  cycloSPORINE (RESTASIS) 0.05 % ophthalmic emulsion, Administer 1 drop to both eyes 3 (Three) Times a Day., Disp: , Rfl:   •  dicyclomine (BENTYL) 20 MG tablet, Take 1 tablet by mouth 2 (Two) Times a Day As Needed (IBS)., Disp: 60 tablet, Rfl: 0  •  Digestive Enzymes capsule, Take 1 dose by mouth 3 (Three) Times a Day., Disp: , Rfl:   •  doxycycline (VIBRAMYCIN) 50 MG capsule, Take 1 capsule by mouth 2 (Two) Times a Day., Disp: 14 capsule, Rfl: 0  •  enalapril (VASOTEC) 5 MG tablet, TAKE ONE TABLET BY MOUTH TWICE A DAY, Disp: 180 tablet, Rfl: 1  •  fluticasone (FLONASE) 50 MCG/ACT nasal spray, SPRAY TWO SPRAYS IN EACH NOSTRIL ONCE DAILY, Disp: 16 mL, Rfl: 2  •  hydrocortisone 2.5 % cream, INSERT RECTALLY TWO TIMES A DAY AS DIRECTED BY PRESCRIBER, Disp: 30 g, Rfl: 0  •  Hydrocortisone, Perianal, (ANUSOL-HC) 2.5 % rectal cream, INSERT RECTALLY TWO TIMES A DAY AS DIRECTED, Disp: 30 g, Rfl: 0  •  LORazepam (ATIVAN) 1 MG tablet, TAKE ONE TABLET BY MOUTH DAILY AS NEEDED FOR ANXIETY, Disp: 30 tablet, Rfl: 0  •  Magnesium 200 MG tablet, Take 100 mg by mouth Daily., Disp: , Rfl:   •  Multiple Vitamins-Minerals (multivitamin with minerals) tablet tablet, Take 1 tablet by mouth Daily., Disp: , Rfl:   •  polyethylene glycol (MIRALAX) powder powder, Take 17 g by mouth As Needed., Disp: , Rfl:   •  Probiotic Product (PROBIOTIC-10 PO), Take 1 dose by mouth Daily., Disp: , Rfl:   •  rosuvastatin (CRESTOR) 5  MG tablet, TAKE ONE TABLET BY MOUTH EVERY NIGHT AT BEDTIME, Disp: 90 tablet, Rfl: 2  •  vitamin C (ASCORBIC ACID) 500 MG tablet, Take 1,000 mg by mouth Daily., Disp: , Rfl:     Objective   Physical Exam  Constitutional:       General: She is not in acute distress.     Appearance: Normal appearance. She is well-developed. She is not ill-appearing or diaphoretic.      Comments: Patient is in no distress, patient has normal voice and speech.  Normal respiratory effort.   HENT:      Head: Normocephalic and atraumatic.   Pulmonary:      Effort: Pulmonary effort is normal.   Musculoskeletal:      Cervical back: Normal range of motion and neck supple.   Skin:     Comments: There is a area of localized swelling and induration noted in the right gluteal region towards the crease.  Area is about 1 cm in diameter.   Neurological:      General: No focal deficit present.      Mental Status: She is alert and oriented to person, place, and time. Mental status is at baseline.   Psychiatric:         Mood and Affect: Mood normal.         Assessment & Plan   Diagnoses and all orders for this visit:    1. Infected inclusion cyst (Primary)  -     doxycycline (VIBRAMYCIN) 50 MG capsule; Take 1 capsule by mouth 2 (Two) Times a Day.  Dispense: 14 capsule; Refill: 0      I will be starting her on antibiotic.  Patient has multiple allergies to different antibiotics and medications overall including several intolerances.  She believes that she was in the past on doxycycline capsules and tolerated these well as tablets recently caused some GI upset.  I will be starting her on that and she is to monitor the symptoms closely and contact us back if any concerns.      Return if symptoms worsen or fail to improve, for Recheck.    Requested Prescriptions     Signed Prescriptions Disp Refills   • doxycycline (VIBRAMYCIN) 50 MG capsule 14 capsule 0     Sig: Take 1 capsule by mouth 2 (Two) Times a Day.

## 2023-01-30 DIAGNOSIS — K76.89 LIVER CYST: Primary | ICD-10-CM

## 2023-02-17 DIAGNOSIS — L72.0 INFECTED INCLUSION CYST: ICD-10-CM

## 2023-02-17 DIAGNOSIS — L08.9 INFECTED INCLUSION CYST: ICD-10-CM

## 2023-02-18 RX ORDER — DOXYCYCLINE HYCLATE 50 MG/1
50 CAPSULE ORAL 2 TIMES DAILY
Qty: 14 CAPSULE | Refills: 0 | OUTPATIENT
Start: 2023-02-18

## 2023-02-21 DIAGNOSIS — F41.9 ANXIETY: ICD-10-CM

## 2023-02-21 RX ORDER — LORAZEPAM 1 MG/1
TABLET ORAL
Qty: 30 TABLET | Refills: 0 | Status: SHIPPED | OUTPATIENT
Start: 2023-02-21 | End: 2023-03-22

## 2023-03-22 DIAGNOSIS — F41.9 ANXIETY: ICD-10-CM

## 2023-03-22 RX ORDER — LORAZEPAM 1 MG/1
TABLET ORAL
Qty: 30 TABLET | Refills: 1 | Status: SHIPPED | OUTPATIENT
Start: 2023-03-22

## 2023-04-05 ENCOUNTER — OFFICE VISIT (OUTPATIENT)
Dept: FAMILY MEDICINE CLINIC | Facility: CLINIC | Age: 68
End: 2023-04-05
Payer: MEDICARE

## 2023-04-05 VITALS
HEIGHT: 64 IN | WEIGHT: 109.6 LBS | SYSTOLIC BLOOD PRESSURE: 131 MMHG | DIASTOLIC BLOOD PRESSURE: 79 MMHG | BODY MASS INDEX: 18.71 KG/M2 | OXYGEN SATURATION: 98 % | HEART RATE: 84 BPM | RESPIRATION RATE: 16 BRPM | TEMPERATURE: 98.2 F

## 2023-04-05 DIAGNOSIS — R19.8 ABDOMINAL SYMPTOMS: Primary | ICD-10-CM

## 2023-04-05 PROCEDURE — 1159F MED LIST DOCD IN RCRD: CPT | Performed by: FAMILY MEDICINE

## 2023-04-05 PROCEDURE — 99213 OFFICE O/P EST LOW 20 MIN: CPT | Performed by: FAMILY MEDICINE

## 2023-04-05 PROCEDURE — 3075F SYST BP GE 130 - 139MM HG: CPT | Performed by: FAMILY MEDICINE

## 2023-04-05 PROCEDURE — 1160F RVW MEDS BY RX/DR IN RCRD: CPT | Performed by: FAMILY MEDICINE

## 2023-04-05 PROCEDURE — 3078F DIAST BP <80 MM HG: CPT | Performed by: FAMILY MEDICINE

## 2023-04-05 NOTE — PROGRESS NOTES
Subjective   Chief Complaint   Patient presents with   • fluttering feeling in the abdomen     Shauna Marks is a 67 y.o. female.     Patient Care Team:  Talia Grey MD as PCP - General (Family Medicine)  Asa Greenwood MD as Consulting Physician (Vascular Surgery)  Yuliet Segundo MD as Consulting Physician (Hematology and Oncology)  Tasneem Mcaias, OD (Optometry)    History of Present Illness  She is coming in today due to some abdominal symptoms.  She reports that for the last week or so she has been experiencing some feeling of fluttering in the left upper quadrant.  It happens only when she lies down on that side and it is short-lived, it does not prevent her from being able to rest at night.  She denies any abdominal pain.  She has been under a lot of stress and she wonders if possible this might be the cause of it.       The following portions of the patient's history were reviewed and updated as appropriate: allergies, current medications, past family history, past medical history, past social history, past surgical history and problem list.  Past Medical History:   Diagnosis Date   • Allergic    • Anxiety    • Blepharitis, right eye    • Colon polyp    • Diverticulosis    • Dry eyes    • Genital herpes    • GERD (gastroesophageal reflux disease)    • Headache    • History of transfusion    • Hyperlipidemia    • Hypertension    • IBS (irritable bowel syndrome)    • Leg cramps    • Osteopenia    • Osteoporosis    • Pneumonia    • Radiculopathy of cervical spine    • Skin disorder    • Visual impairment    • Vulva cancer     Dr. Patel     Past Surgical History:   Procedure Laterality Date   • BREAST SURGERY      biopsies   • CARDIAC CATHETERIZATION     •  SECTION     • COLONOSCOPY     • COLONOSCOPY N/A 10/25/2021    Procedure: COLONOSCOPY WITH POLYPECTOMY X11;  Surgeon: Sudarshan Mitchell MD;  Location: [facility] END[facility];  Service: Gastroenterology;  Laterality:  "N/A;  colon polyps, diverticulosis   • COLONOSCOPY W/ POLYPECTOMY N/A 10/28/2020    Procedure: Colonoscopy with polypectomy x 10;  Surgeon: Sudarshan Mitchell MD;  Location: [facility] ENDOSCOPY;  Service: Gastroenterology;  Laterality: N/A;  colon polyps x 10,  hemorrhoids   • COSMETIC SURGERY  2022    Ptosis   • HERNIA REPAIR     • LASER ABLATION CONDYLOMA CERVICAL / VULVAR     • SKIN BIOPSY     • SPLENECTOMY  2014   • SPLENECTOMY     • VARICOSE VEIN SURGERY       The patient has a family history of  Family History   Problem Relation Age of Onset   • Lung cancer Mother    • Cancer Mother    • Coronary artery disease Father    • Heart disease Father    • Hypertension Father    • Early death Father      Social History     Socioeconomic History   • Marital status: Significant Other   Tobacco Use   • Smoking status: Light Smoker     Packs/day: 0.50     Years: 0.00     Pack years: 0.00     Types: Cigarettes   • Smokeless tobacco: Never   Substance and Sexual Activity   • Alcohol use: Yes     Alcohol/week: 5.0 standard drinks     Types: 5 Glasses of wine per week     Comment: social   • Drug use: No   • Sexual activity: Not Currently     Partners: Male       Review of Systems   Constitutional: Negative for activity change, fatigue and fever.   Respiratory: Negative for shortness of breath and wheezing.    Cardiovascular: Negative for chest pain, palpitations and leg swelling.   Gastrointestinal: Negative for abdominal pain.   Musculoskeletal: Negative for arthralgias and back pain.   Skin: Negative for rash.   Neurological: Negative for tremors and headache.     Visit Vitals  /79 (BP Location: Left arm, Patient Position: Sitting, Cuff Size: Small Adult)   Pulse 84   Temp 98.2 °F (36.8 °C) (Temporal)   Resp 16   Ht 162.6 cm (64.02\")   Wt 49.7 kg (109 lb 9.6 oz)   LMP  (LMP Unknown)   SpO2 98%   BMI 18.80 kg/m²       BMI is within normal parameters. No other follow-up for BMI required.      Current Outpatient " Medications:   •  acyclovir (ZOVIRAX) 400 MG tablet, TAKE ONE TABLET BY MOUTH DAILY, Disp: 90 tablet, Rfl: 1  •  acyclovir (Zovirax) 5 % ointment, Apply 1 application topically to the appropriate area as directed Every 4 (Four) Hours. (Patient taking differently: Apply 1 application topically to the appropriate area as directed As Needed.), Disp: 15 g, Rfl: 0  •  betamethasone valerate (VALISONE) 0.1 % ointment, As Needed., Disp: , Rfl:   •  clobetasol (TEMOVATE) 0.05 % ointment, Apply 1 application topically to the appropriate area as directed As Needed., Disp: , Rfl:   •  cycloSPORINE (RESTASIS) 0.05 % ophthalmic emulsion, Administer 1 drop to both eyes 3 (Three) Times a Day., Disp: , Rfl:   •  dicyclomine (BENTYL) 20 MG tablet, Take 1 tablet by mouth 2 (Two) Times a Day As Needed (IBS)., Disp: 60 tablet, Rfl: 0  •  Digestive Enzymes capsule, Take 1 dose by mouth 3 (Three) Times a Day., Disp: , Rfl:   •  doxycycline (VIBRAMYCIN) 50 MG capsule, Take 1 capsule by mouth 2 (Two) Times a Day., Disp: 14 capsule, Rfl: 0  •  enalapril (VASOTEC) 5 MG tablet, TAKE ONE TABLET BY MOUTH TWICE A DAY, Disp: 180 tablet, Rfl: 1  •  fluticasone (FLONASE) 50 MCG/ACT nasal spray, SPRAY TWO SPRAYS IN EACH NOSTRIL ONCE DAILY, Disp: 16 mL, Rfl: 2  •  hydrocortisone 2.5 % cream, INSERT RECTALLY TWO TIMES A DAY AS DIRECTED BY PRESCRIBER, Disp: 30 g, Rfl: 0  •  Hydrocortisone, Perianal, (ANUSOL-HC) 2.5 % rectal cream, INSERT RECTALLY TWO TIMES A DAY AS DIRECTED, Disp: 30 g, Rfl: 0  •  LORazepam (ATIVAN) 1 MG tablet, TAKE ONE TABLET BY MOUTH DAILY AS NEEDED FOR ANXIETY, Disp: 30 tablet, Rfl: 1  •  Magnesium 200 MG tablet, Take 100 mg by mouth Daily., Disp: , Rfl:   •  Multiple Vitamins-Minerals (multivitamin with minerals) tablet tablet, Take 1 tablet by mouth Daily., Disp: , Rfl:   •  polyethylene glycol (MIRALAX) powder powder, Take 17 g by mouth As Needed., Disp: , Rfl:   •  Probiotic Product (PROBIOTIC-10 PO), Take 1 dose by mouth  Daily., Disp: , Rfl:   •  rosuvastatin (CRESTOR) 5 MG tablet, TAKE ONE TABLET BY MOUTH EVERY NIGHT AT BEDTIME, Disp: 90 tablet, Rfl: 2  •  vitamin C (ASCORBIC ACID) 500 MG tablet, Take 2 tablets by mouth Daily., Disp: , Rfl:     Objective   Physical Exam  Constitutional:       General: She is not in acute distress.     Appearance: Normal appearance. She is well-developed. She is not ill-appearing or diaphoretic.      Comments: Patient is in no distress, patient has normal voice and speech.  Normal respiratory effort.   HENT:      Head: Normocephalic and atraumatic.   Pulmonary:      Effort: Pulmonary effort is normal.   Abdominal:      General: There is no distension.      Palpations: There is no mass.      Tenderness: There is no abdominal tenderness. There is no guarding or rebound.      Hernia: No hernia is present.   Musculoskeletal:      Cervical back: Normal range of motion and neck supple.   Neurological:      General: No focal deficit present.      Mental Status: She is alert and oriented to person, place, and time. Mental status is at baseline.   Psychiatric:         Mood and Affect: Mood normal.       CMP    CMP 9/26/22   Glucose 93   BUN 11   Creatinine 0.85   eGFR 74.7   Sodium 137   Potassium 3.7   Chloride 96 (A)   Calcium 9.9   Total Protein 8.2   Albumin 5.00   Globulin 3.2   Total Bilirubin 0.5   Alkaline Phosphatase 78   AST (SGOT) 27   ALT (SGPT) 16   Albumin/Globulin Ratio 1.6   BUN/Creatinine Ratio 12.9   Anion Gap 14.5   (A) Abnormal value       Comments are available for some flowsheets but are not being displayed.           CBC    CBC 9/26/22   WBC 13.59 (A)   RBC 4.57   Hemoglobin 14.8   Hematocrit 44.7   MCV 97.8 (A)   MCH 32.4   MCHC 33.1   RDW 12.5   Platelets 269   (A) Abnormal value                Assessment & Plan   Diagnoses and all orders for this visit:    1. Abdominal symptoms (Primary)  -     Urinalysis With Culture If Indicated - Urine, Clean Catch      I reviewed her symptoms  and concerns.  I also reviewed some of her previous labs, will be getting urinalysis.  Her abdominal exam today is intact.  Her symptoms are most probably due to some functional movement in the gut.  Patient was advised to closely monitor the symptoms and contact us back if no resolution over time or any worsening.  She may also consider to follow-up with a GI doctor if concerns continue.      Return if symptoms worsen or fail to improve, for Recheck.    Requested Prescriptions      No prescriptions requested or ordered in this encounter

## 2023-04-16 RX ORDER — ACYCLOVIR 400 MG/1
TABLET ORAL
Qty: 90 TABLET | Refills: 1 | Status: SHIPPED | OUTPATIENT
Start: 2023-04-16

## 2023-05-02 ENCOUNTER — HOSPITAL ENCOUNTER (OUTPATIENT)
Dept: CARDIOLOGY | Facility: HOSPITAL | Age: 68
Discharge: HOME | End: 2023-05-02
Payer: MEDICARE

## 2023-05-02 ENCOUNTER — APPOINTMENT (OUTPATIENT)
Dept: VASCULAR SURGERY | Facility: HOSPITAL | Age: 68
End: 2023-05-02
Payer: MEDICARE

## 2023-05-02 DIAGNOSIS — I73.9 PERIPHERAL VASCULAR DISEASE, UNSPECIFIED: ICD-10-CM

## 2023-05-02 DIAGNOSIS — I65.23 BILATERAL CAROTID ARTERY OCCLUSION: ICD-10-CM

## 2023-05-02 DIAGNOSIS — I71.40 ABDOMINAL AORTIC ANEURYSM WITHOUT RUPTURE: ICD-10-CM

## 2023-05-02 LAB
ABDOMINAL DIST AORTA AP: 1.5 CM
ABDOMINAL DIST AORTA TRANS: 1.9 CM
ABDOMINAL DIST AORTA VEL: 91 CM/S
ABDOMINAL LT COM ILIAC AP: 0.59 CM
ABDOMINAL LT COM ILIAC TRANS: 0.79 CM
ABDOMINAL LT COM ILIAC VEL: 74 CM/S
ABDOMINAL MID AORTA AP: 1.5 CM
ABDOMINAL MID AORTA TRANS: 1.6 CM
ABDOMINAL MID AORTA VEL: 59 CM/S
ABDOMINAL PROX AORTA AP: 2.6 CM
ABDOMINAL PROX AORTA TRANS: 2.5 CM
ABDOMINAL PROX AORTA VEL: 59 CM/S
ABDOMINAL RT COM ILIAC AP: 0.73 CM
ABDOMINAL RT COM ILIAC TRANS: 1.03 CM
ABDOMINAL RT COM ILIAC VEL: 98 CM/S
BH CV LOWER ARTERIAL LEFT ABI RATIO: 1.07
BH CV LOWER ARTERIAL LEFT DORSALIS PEDIS SYS MAX: 143
BH CV LOWER ARTERIAL LEFT GREAT TOE SYS MAX: 90
BH CV LOWER ARTERIAL LEFT POST TIBIAL SYS MAX: 133
BH CV LOWER ARTERIAL LEFT TBI RATIO: 0.67
BH CV LOWER ARTERIAL RIGHT ABI RATIO: 1
BH CV LOWER ARTERIAL RIGHT DORSALIS PEDIS SYS MAX: 134
BH CV LOWER ARTERIAL RIGHT GREAT TOE SYS MAX: 62
BH CV LOWER ARTERIAL RIGHT POST TIBIAL SYS MAX: 127
BH CV LOWER ARTERIAL RIGHT TBI RATIO: 0.46
BH CV XLRA MEAS LEFT DIST CCA EDV: -24.1 CM/SEC
BH CV XLRA MEAS LEFT DIST CCA PSV: -64.9 CM/SEC
BH CV XLRA MEAS LEFT DIST ICA EDV: -21.7 CM/SEC
BH CV XLRA MEAS LEFT DIST ICA PSV: -56 CM/SEC
BH CV XLRA MEAS LEFT PROX CCA EDV: 21.6 CM/SEC
BH CV XLRA MEAS LEFT PROX CCA PSV: 64.9 CM/SEC
BH CV XLRA MEAS LEFT PROX ECA PSV: -53.6 CM/SEC
BH CV XLRA MEAS LEFT PROX ICA EDV: -21.1 CM/SEC
BH CV XLRA MEAS LEFT PROX ICA PSV: -56.5 CM/SEC
BH CV XLRA MEAS LEFT PROX SCLA PSV: 92.6 CM/SEC
BH CV XLRA MEAS LEFT VERTEBRAL A PSV: -55.9 CM/SEC
BH CV XLRA MEAS RIGHT DIST CCA EDV: -20.5 CM/SEC
BH CV XLRA MEAS RIGHT DIST CCA PSV: -55.9 CM/SEC
BH CV XLRA MEAS RIGHT DIST ICA EDV: -48 CM/SEC
BH CV XLRA MEAS RIGHT DIST ICA PSV: -115 CM/SEC
BH CV XLRA MEAS RIGHT PROX CCA EDV: 17.4 CM/SEC
BH CV XLRA MEAS RIGHT PROX CCA PSV: 60.9 CM/SEC
BH CV XLRA MEAS RIGHT PROX ECA PSV: -57.6 CM/SEC
BH CV XLRA MEAS RIGHT PROX ICA EDV: -27.4 CM/SEC
BH CV XLRA MEAS RIGHT PROX ICA PSV: -64.2 CM/SEC
BH CV XLRA MEAS RIGHT PROX SCLA PSV: -65.9 CM/SEC
BH CV XLRA MEAS RIGHT VERTEBRAL A PSV: -51.8 CM/SEC
LEFT ARM BP: 131 MMHG
MAXIMAL PREDICTED HEART RATE: 151 BPM
RIGHT ARM BP: 134 MMHG
STRESS TARGET HR: 128 BPM
UPPER ARTERIAL LEFT ARM BRACHIAL SYS MAX: 131 MMHG
UPPER ARTERIAL RIGHT ARM BRACHIAL SYS MAX: 134 MMHG

## 2023-05-02 PROCEDURE — G0463 HOSPITAL OUTPT CLINIC VISIT: HCPCS

## 2023-05-02 PROCEDURE — 93978 VASCULAR STUDY: CPT

## 2023-05-02 PROCEDURE — 93880 EXTRACRANIAL BILAT STUDY: CPT

## 2023-05-02 PROCEDURE — 93922 UPR/L XTREMITY ART 2 LEVELS: CPT

## 2023-05-22 DIAGNOSIS — F41.9 ANXIETY: ICD-10-CM

## 2023-05-22 RX ORDER — LORAZEPAM 1 MG/1
TABLET ORAL
Qty: 30 TABLET | Refills: 0 | Status: SHIPPED | OUTPATIENT
Start: 2023-05-22

## 2023-05-30 ENCOUNTER — OFFICE VISIT (OUTPATIENT)
Dept: FAMILY MEDICINE CLINIC | Facility: CLINIC | Age: 68
End: 2023-05-30

## 2023-05-30 VITALS
HEIGHT: 63 IN | OXYGEN SATURATION: 98 % | SYSTOLIC BLOOD PRESSURE: 145 MMHG | RESPIRATION RATE: 16 BRPM | BODY MASS INDEX: 18.68 KG/M2 | HEART RATE: 79 BPM | DIASTOLIC BLOOD PRESSURE: 80 MMHG | WEIGHT: 105.4 LBS | TEMPERATURE: 98.6 F

## 2023-05-30 DIAGNOSIS — F17.200 TOBACCO DEPENDENCE: ICD-10-CM

## 2023-05-30 DIAGNOSIS — I10 ESSENTIAL HYPERTENSION: Primary | ICD-10-CM

## 2023-05-30 PROCEDURE — 93000 ELECTROCARDIOGRAM COMPLETE: CPT | Performed by: FAMILY MEDICINE

## 2023-05-30 PROCEDURE — 1160F RVW MEDS BY RX/DR IN RCRD: CPT | Performed by: FAMILY MEDICINE

## 2023-05-30 PROCEDURE — 3077F SYST BP >= 140 MM HG: CPT | Performed by: FAMILY MEDICINE

## 2023-05-30 PROCEDURE — 1159F MED LIST DOCD IN RCRD: CPT | Performed by: FAMILY MEDICINE

## 2023-05-30 PROCEDURE — 3079F DIAST BP 80-89 MM HG: CPT | Performed by: FAMILY MEDICINE

## 2023-05-30 PROCEDURE — 99213 OFFICE O/P EST LOW 20 MIN: CPT | Performed by: FAMILY MEDICINE

## 2023-05-30 RX ORDER — ENALAPRIL MALEATE 5 MG/1
5 TABLET ORAL 2 TIMES DAILY
Qty: 180 TABLET | Refills: 1 | Status: SHIPPED | OUTPATIENT
Start: 2023-05-30

## 2023-05-30 NOTE — PROGRESS NOTES
Subjective   Chief Complaint   Patient presents with   • Blood Pressure issues   • Hypertension   • Med Refill   • Stress     Shauna Marks is a 68 y.o. female.     Patient Care Team:  Talia Grey MD as PCP - General (Family Medicine)  Asa Greenwood MD as Consulting Physician (Vascular Surgery)  Yuliet Segundo MD as Consulting Physician (Hematology and Oncology)  Tasneem Macias OD (Optometry)    History of Present Illness  She is coming in today to address her blood pressure.  She has been treated for hypertension for many years.  She is on enalapril 5 mg 2 times a day.  Lately she has been noticing some on and off elevated blood pressure readings.  She has been under a lot of stress and she wonders if this possibly might be the cause.  She also has noted some on and off chest discomfort which she describes as sharp pains which come and go and last only for few seconds.  No chest heaviness or difficulty breathing.  She is a smoker and smokes about 7 cigarettes a day.       The following portions of the patient's history were reviewed and updated as appropriate: allergies, current medications, past family history, past medical history, past social history, past surgical history and problem list.  Past Medical History:   Diagnosis Date   • Allergic    • Anxiety    • Blepharitis, right eye    • Colon polyp    • Diverticulosis    • Dry eyes    • Genital herpes    • GERD (gastroesophageal reflux disease)    • Headache    • History of transfusion    • Hyperlipidemia    • Hypertension    • IBS (irritable bowel syndrome)    • Leg cramps    • Osteopenia    • Osteoporosis    • Pneumonia    • Radiculopathy of cervical spine    • Skin disorder    • Visual impairment    • Vulva cancer     Dr. Patel     Past Surgical History:   Procedure Laterality Date   • BREAST SURGERY      biopsies   • CARDIAC CATHETERIZATION     •  SECTION     • COLONOSCOPY     • COLONOSCOPY N/A 10/25/2021     "Procedure: COLONOSCOPY WITH POLYPECTOMY X11;  Surgeon: Sudarshan Mitchell MD;  Location: [facility] ENDOSCOPY;  Service: Gastroenterology;  Laterality: N/A;  colon polyps, diverticulosis   • COLONOSCOPY W/ POLYPECTOMY N/A 10/28/2020    Procedure: Colonoscopy with polypectomy x 10;  Surgeon: Sudarshan Mitchell MD;  Location: [facility] ENDOSCOPY;  Service: Gastroenterology;  Laterality: N/A;  colon polyps x 10,  hemorrhoids   • COSMETIC SURGERY  2022    Ptosis   • HERNIA REPAIR     • LASER ABLATION CONDYLOMA CERVICAL / VULVAR     • SKIN BIOPSY     • SPLENECTOMY  2014   • SPLENECTOMY     • VARICOSE VEIN SURGERY       The patient has a family history of  Family History   Problem Relation Age of Onset   • Lung cancer Mother    • Cancer Mother    • Coronary artery disease Father    • Heart disease Father    • Hypertension Father    • Early death Father      Social History     Socioeconomic History   • Marital status: Significant Other   Tobacco Use   • Smoking status: Light Smoker     Packs/day: 0.50     Years: 0.00     Pack years: 0.00     Types: Cigarettes   • Smokeless tobacco: Never   Substance and Sexual Activity   • Alcohol use: Yes     Alcohol/week: 5.0 standard drinks     Types: 5 Glasses of wine per week     Comment: social   • Drug use: No   • Sexual activity: Not Currently     Partners: Male       Review of Systems   Constitutional: Negative for activity change, fatigue and fever.   Respiratory: Negative for shortness of breath and wheezing.    Cardiovascular: Negative for chest pain, palpitations and leg swelling.   Musculoskeletal: Negative for arthralgias and back pain.   Skin: Negative for rash.   Neurological: Negative for tremors and headache.     Visit Vitals  /80 (BP Location: Left arm, Patient Position: Sitting, Cuff Size: Adult)   Pulse 79   Temp 98.6 °F (37 °C) (Infrared)   Resp 16   Ht 160 cm (63\")   Wt 47.8 kg (105 lb 6.4 oz)   LMP  (LMP Unknown)   SpO2 98%   BMI 18.67 kg/m²       BMI is " within normal parameters. No other follow-up for BMI required.      Current Outpatient Medications:   •  enalapril (VASOTEC) 5 MG tablet, Take 1 tablet by mouth 2 (Two) Times a Day., Disp: 180 tablet, Rfl: 1  •  acyclovir (ZOVIRAX) 400 MG tablet, TAKE ONE TABLET BY MOUTH DAILY, Disp: 90 tablet, Rfl: 1  •  acyclovir (Zovirax) 5 % ointment, Apply 1 application topically to the appropriate area as directed Every 4 (Four) Hours. (Patient taking differently: Apply 1 application topically to the appropriate area as directed As Needed.), Disp: 15 g, Rfl: 0  •  betamethasone valerate (VALISONE) 0.1 % ointment, As Needed., Disp: , Rfl:   •  clobetasol (TEMOVATE) 0.05 % ointment, Apply 1 application topically to the appropriate area as directed As Needed., Disp: , Rfl:   •  cycloSPORINE (RESTASIS) 0.05 % ophthalmic emulsion, Administer 1 drop to both eyes 3 (Three) Times a Day., Disp: , Rfl:   •  dicyclomine (BENTYL) 20 MG tablet, Take 1 tablet by mouth 2 (Two) Times a Day As Needed (IBS)., Disp: 60 tablet, Rfl: 0  •  Digestive Enzymes capsule, Take 1 dose by mouth 3 (Three) Times a Day., Disp: , Rfl:   •  fluticasone (FLONASE) 50 MCG/ACT nasal spray, SPRAY TWO SPRAYS IN EACH NOSTRIL ONCE DAILY, Disp: 16 mL, Rfl: 2  •  hydrocortisone 2.5 % cream, INSERT RECTALLY TWO TIMES A DAY AS DIRECTED BY PRESCRIBER, Disp: 30 g, Rfl: 0  •  Hydrocortisone, Perianal, (ANUSOL-HC) 2.5 % rectal cream, INSERT RECTALLY TWO TIMES A DAY AS DIRECTED, Disp: 30 g, Rfl: 0  •  LORazepam (ATIVAN) 1 MG tablet, TAKE ONE TABLET BY MOUTH DAILY AS NEEDED FOR ANXIETY, Disp: 30 tablet, Rfl: 0  •  Magnesium 200 MG tablet, Take 100 mg by mouth Daily., Disp: , Rfl:   •  Multiple Vitamins-Minerals (multivitamin with minerals) tablet tablet, Take 1 tablet by mouth Daily., Disp: , Rfl:   •  polyethylene glycol (MIRALAX) powder powder, Take 17 g by mouth As Needed., Disp: , Rfl:   •  Probiotic Product (PROBIOTIC-10 PO), Take 1 dose by mouth Daily., Disp: , Rfl:   •   rosuvastatin (CRESTOR) 5 MG tablet, TAKE ONE TABLET BY MOUTH EVERY NIGHT AT BEDTIME, Disp: 90 tablet, Rfl: 2  •  vitamin C (ASCORBIC ACID) 500 MG tablet, Take 2 tablets by mouth Daily., Disp: , Rfl:     Objective   Physical Exam  Constitutional:       General: She is not in acute distress.     Appearance: Normal appearance. She is well-developed. She is not ill-appearing or diaphoretic.      Comments: Patient is in no distress, patient has normal voice and speech.  Normal respiratory effort.   HENT:      Head: Normocephalic and atraumatic.   Pulmonary:      Effort: Pulmonary effort is normal.   Musculoskeletal:      Cervical back: Normal range of motion and neck supple.   Neurological:      General: No focal deficit present.      Mental Status: She is alert and oriented to person, place, and time. Mental status is at baseline.   Psychiatric:         Mood and Affect: Mood normal.         FOLLOWING LABS WERE REVIEWED TODAY:  CMP        9/26/2022    09:27   CMP   Glucose 93     BUN 11     Creatinine 0.85     EGFR 74.7     Sodium 137     Potassium 3.7     Chloride 96     Calcium 9.9     Total Protein 8.2     Albumin 5.00     Globulin 3.2     Total Bilirubin 0.5     Alkaline Phosphatase 78     AST (SGOT) 27     ALT (SGPT) 16     Albumin/Globulin Ratio 1.6     BUN/Creatinine Ratio 12.9     Anion Gap 14.5       CBC        9/26/2022    09:27   CBC   WBC 13.59     RBC 4.57     Hemoglobin 14.8     Hematocrit 44.7     MCV 97.8     MCH 32.4     MCHC 33.1     RDW 12.5     Platelets 269          ECG 12 Lead    Date/Time: 5/30/2023 11:25 AM  Performed by: Talia Grey MD  Authorized by: Talia Grey MD   Comparison: not compared with previous ECG   Previous ECG: no previous ECG available  Rhythm: sinus rhythm  Rate: normal  Conduction: conduction normal  ST Segments: ST segments normal  T Waves: T waves normal  QRS axis: normal  Other findings: non-specific ST-T wave changes    Clinical impression: non-specific  ECG          CMP        9/26/2022    09:27   CMP   Glucose 93     BUN 11     Creatinine 0.85     EGFR 74.7     Sodium 137     Potassium 3.7     Chloride 96     Calcium 9.9     Total Protein 8.2     Albumin 5.00     Globulin 3.2     Total Bilirubin 0.5     Alkaline Phosphatase 78     AST (SGOT) 27     ALT (SGPT) 16     Albumin/Globulin Ratio 1.6     BUN/Creatinine Ratio 12.9     Anion Gap 14.5            Assessment & Plan   Diagnoses and all orders for this visit:    1. Essential hypertension (Primary)  -     enalapril (VASOTEC) 5 MG tablet; Take 1 tablet by mouth 2 (Two) Times a Day.  Dispense: 180 tablet; Refill: 1  -     ECG 12 Lead    2. Tobacco dependence      EKG was done today and it did not show any acute changes.  She will continue enalapril 5 mg 2 times a day as her blood pressure overall looks good.  She has been under a lot of stress and that definitely has impact on her overall wellbeing.  She is to contact us back if any concerns.    Return in about 6 months (around 11/30/2023) for Medicare Wellness.    Requested Prescriptions     Signed Prescriptions Disp Refills   • enalapril (VASOTEC) 5 MG tablet 180 tablet 1     Sig: Take 1 tablet by mouth 2 (Two) Times a Day.

## 2023-07-11 ENCOUNTER — OFFICE VISIT (OUTPATIENT)
Dept: FAMILY MEDICINE CLINIC | Facility: CLINIC | Age: 68
End: 2023-07-11
Payer: MEDICARE

## 2023-07-11 VITALS
OXYGEN SATURATION: 97 % | SYSTOLIC BLOOD PRESSURE: 154 MMHG | BODY MASS INDEX: 18.96 KG/M2 | TEMPERATURE: 98.2 F | HEIGHT: 63 IN | DIASTOLIC BLOOD PRESSURE: 80 MMHG | WEIGHT: 107 LBS | HEART RATE: 88 BPM

## 2023-07-11 DIAGNOSIS — M79.671 RIGHT FOOT PAIN: Primary | ICD-10-CM

## 2023-07-11 PROCEDURE — 99213 OFFICE O/P EST LOW 20 MIN: CPT | Performed by: FAMILY MEDICINE

## 2023-07-11 PROCEDURE — 3077F SYST BP >= 140 MM HG: CPT | Performed by: FAMILY MEDICINE

## 2023-07-11 PROCEDURE — 3079F DIAST BP 80-89 MM HG: CPT | Performed by: FAMILY MEDICINE

## 2023-07-11 RX ORDER — PERPHENAZINE 4 MG
TABLET ORAL
COMMUNITY

## 2023-07-11 NOTE — PROGRESS NOTES
"Chief Complaint  Foot Pain (Started today- went to stand up and felt like she stepped on something but nothing was there- RT )    Subjective        Shauna Marks presents to [facility]  Foot Pain  This is a new problem. The current episode started today. The problem occurs constantly. The problem has been unchanged. Pertinent negatives include no chills, fever or rash. The symptoms are aggravated by walking. She has tried rest and ice for the symptoms. The treatment provided mild relief.     Objective   Vital Signs:  /80 (BP Location: Left arm, Patient Position: Sitting, Cuff Size: Adult)   Pulse 88   Temp 98.2 °F (36.8 °C) (Infrared)   Ht 160 cm (63\")   Wt 48.5 kg (107 lb)   SpO2 97%   BMI 18.95 kg/m²   Estimated body mass index is 18.95 kg/m² as calculated from the following:    Height as of this encounter: 160 cm (63\").    Weight as of this encounter: 48.5 kg (107 lb).       BMI is within normal parameters. No other follow-up for BMI required.      Physical Exam  Vitals and nursing note reviewed.   Constitutional:       General: She is not in acute distress.     Appearance: She is well-developed.   HENT:      Head: Normocephalic.   Eyes:      General: Lids are normal.   Neck:      Thyroid: No thyroid mass or thyromegaly.      Trachea: Trachea normal.   Cardiovascular:      Rate and Rhythm: Normal rate and regular rhythm.      Heart sounds: Normal heart sounds.   Pulmonary:      Effort: Pulmonary effort is normal.      Breath sounds: Normal breath sounds.   Musculoskeletal:      Cervical back: Normal range of motion.      Right foot: Decreased range of motion. Swelling and tenderness present.   Feet:      Right foot:      Skin integrity: Skin integrity normal.   Lymphadenopathy:      Cervical: No cervical adenopathy.   Skin:     General: Skin is warm and dry.   Neurological:      Mental Status: She is alert and oriented to person, place, and time.   Psychiatric:    "      Attention and Perception: She is attentive.         Mood and Affect: Mood normal.         Speech: Speech normal.         Behavior: Behavior normal.      Result Review :  The following data was reviewed by: Danielle Corrigan MD on 07/11/2023:  Common labs          9/26/2022    09:27   Common Labs   Glucose 93    BUN 11    Creatinine 0.85    Sodium 137    Potassium 3.7    Chloride 96    Calcium 9.9    Albumin 5.00    Total Bilirubin 0.5    Alkaline Phosphatase 78    AST (SGOT) 27    ALT (SGPT) 16    WBC 13.59    Hemoglobin 14.8    Hematocrit 44.7    Platelets 269    Total Cholesterol 137    Triglycerides 68    HDL Cholesterol 60    LDL Cholesterol  63    Hemoglobin A1C 5.9                   Assessment and Plan   Diagnoses and all orders for this visit:    1. Right foot pain (Primary)  Assessment & Plan:  Apply a compressive ACE bandage. Rest and elevate the affected painful area.  Apply cold compresses intermittently as needed.  As pain recedes, begin normal activities slowly as tolerated.  Call if symptoms persist.      Orders:  -     XR Foot 3+ View Right; Future             Follow Up   No follow-ups on file.  Patient was given instructions and counseling regarding her condition or for health maintenance advice. Please see specific information pulled into the AVS if appropriate.

## 2023-07-30 DIAGNOSIS — M81.0 AGE-RELATED OSTEOPOROSIS WITHOUT CURRENT PATHOLOGICAL FRACTURE: Primary | ICD-10-CM

## 2023-08-08 ENCOUNTER — OFFICE VISIT (OUTPATIENT)
Dept: ENDOCRINOLOGY | Facility: CLINIC | Age: 68
End: 2023-08-08
Payer: MEDICARE

## 2023-08-08 ENCOUNTER — LAB (OUTPATIENT)
Dept: LAB | Facility: HOSPITAL | Age: 68
End: 2023-08-08
Payer: MEDICARE

## 2023-08-08 VITALS
BODY MASS INDEX: 18.44 KG/M2 | HEART RATE: 89 BPM | WEIGHT: 108 LBS | SYSTOLIC BLOOD PRESSURE: 142 MMHG | DIASTOLIC BLOOD PRESSURE: 82 MMHG | HEIGHT: 64 IN | OXYGEN SATURATION: 97 %

## 2023-08-08 DIAGNOSIS — E04.1 THYROID NODULE: ICD-10-CM

## 2023-08-08 DIAGNOSIS — I10 PRIMARY HYPERTENSION: ICD-10-CM

## 2023-08-08 DIAGNOSIS — E78.5 HYPERLIPIDEMIA, UNSPECIFIED HYPERLIPIDEMIA TYPE: ICD-10-CM

## 2023-08-08 DIAGNOSIS — M81.0 OSTEOPOROSIS WITHOUT CURRENT PATHOLOGICAL FRACTURE, UNSPECIFIED OSTEOPOROSIS TYPE: ICD-10-CM

## 2023-08-08 DIAGNOSIS — M81.0 OSTEOPOROSIS WITHOUT CURRENT PATHOLOGICAL FRACTURE, UNSPECIFIED OSTEOPOROSIS TYPE: Primary | ICD-10-CM

## 2023-08-08 LAB
25(OH)D3 SERPL-MCNC: 39.8 NG/ML (ref 30–100)
ALBUMIN SERPL-MCNC: 4.4 G/DL (ref 3.5–5.2)
ALBUMIN/GLOB SERPL: 1.5 G/DL
ALP SERPL-CCNC: 65 U/L (ref 39–117)
ALT SERPL W P-5'-P-CCNC: 20 U/L (ref 1–33)
ANION GAP SERPL CALCULATED.3IONS-SCNC: 9 MMOL/L (ref 5–15)
AST SERPL-CCNC: 26 U/L (ref 1–32)
BILIRUB SERPL-MCNC: 0.3 MG/DL (ref 0–1.2)
BUN SERPL-MCNC: 16 MG/DL (ref 8–23)
BUN/CREAT SERPL: 19 (ref 7–25)
CALCIUM SPEC-SCNC: 9.5 MG/DL (ref 8.6–10.5)
CHLORIDE SERPL-SCNC: 103 MMOL/L (ref 98–107)
CO2 SERPL-SCNC: 27 MMOL/L (ref 22–29)
CREAT SERPL-MCNC: 0.84 MG/DL (ref 0.57–1)
EGFRCR SERPLBLD CKD-EPI 2021: 75.3 ML/MIN/1.73
GLOBULIN UR ELPH-MCNC: 2.9 GM/DL
GLUCOSE SERPL-MCNC: 93 MG/DL (ref 65–99)
POTASSIUM SERPL-SCNC: 4.3 MMOL/L (ref 3.5–5.2)
PROT SERPL-MCNC: 7.3 G/DL (ref 6–8.5)
SODIUM SERPL-SCNC: 139 MMOL/L (ref 136–145)
T4 FREE SERPL-MCNC: 1.05 NG/DL (ref 0.93–1.7)
TSH SERPL DL<=0.05 MIU/L-ACNC: 0.8 UIU/ML (ref 0.27–4.2)

## 2023-08-08 PROCEDURE — 82306 VITAMIN D 25 HYDROXY: CPT

## 2023-08-08 PROCEDURE — 80053 COMPREHEN METABOLIC PANEL: CPT

## 2023-08-08 PROCEDURE — 84443 ASSAY THYROID STIM HORMONE: CPT

## 2023-08-08 PROCEDURE — 36415 COLL VENOUS BLD VENIPUNCTURE: CPT

## 2023-08-08 PROCEDURE — 84439 ASSAY OF FREE THYROXINE: CPT

## 2023-08-08 NOTE — PATIENT INSTRUCTIONS
Please,    - Get blood work done.  - Plan for thyroid ultrasound.  - You can follow-up in my clinic in February 2024 after you get clearance from your   periodontist that you your bone have completely healed and you can be started on either Reclast or Prolia therapy.  - Continue calcium and vitamin D supplementation without any changes for now.    Thank you for your visit today.    If you have any questions or concerns please feel free to reach out of the office.

## 2023-08-08 NOTE — PROGRESS NOTES
-----------------------------------------------------------------  ENDOCRINE CLINIC NOTE  -----------------------------------------------------------------        PATIENT NAME: Shauna Marks  PATIENT : 1955 AGE: 68 y.o.  MRN NUMBER: [id number]  PRIMARY CARE: Talia Grey MD    ==========================================================================    CHIEF COMPLAINT: Osteoporosis  DATE OF SERVICE: 2023         ==========================================================================    HPI / SUBJECTIVE    68 y.o. female is seen in the clinic today for evaluation of osteoporosis.  Patient is known to have osteoporosis at least .  Patient currently on calcium and vitamin D therapy.  Otherwise denied any use of phosphonate therapy.  Last DEXA scan for the patient was in 2023.  Denies any history of fracture.  Denies any history of fall.  For fracture exercise patient performs yoga about half an hour and walking around 1 to 2 miles every day.  Menarche at age 13 and patient have 2 biological kids.  Menopause at age 54.  Patient has not been treated with any hormone replacement therapy.  There is no history of high calcium or nephrolithiasis.  Patient is an active smoker, around half pack per day, for around 30 years.  Patient have 30-pack-year history of smoking.  Denies any history of chronic steroid use.  Denies any past history of rheumatological disorder.  Patient have underlying history of goiter but denies any use of medication in the past.  There is a possibility of radiation to skin when she was a teenager, she is unsure about the exact procedure.  Patient is not sure about any underlying family history of osteoporosis.  Patient reports to have lost at least an inch of her height.  Denies any history of kidney issues.  Patient recently had dental workup done with poor healing, currently following.  On Tuesday in the clinic.  Next appointment is in 2023.  Patient  have underlying history of acid reflux and IBS, currently taking probiotics and enzymes not on any other medication.  Currently taking calcium citrate twice a day.  No milk intake.  But takes 1 slice of cheese every day.  Cup of yogurt every day.  Takes vitamin D as a part of the supplement which is not calcium.  Significant history of valva cancer, s/p surgery.  No exposure to radiation at that time.  Patient have history significant for thyroid nodule.  She is currently experiencing night sweats, inability to gain weight, skin and hair changes and ocular migraines.  She had last blood work done in  which was within normal limit and there was a last ultrasound to the best of my knowledge in the chart is from  where there was evidence of thyroid nodule 0.3 cm.    ==========================================================================                                                PAST MEDICAL HISTORY    Past Medical History:   Diagnosis Date    Allergic     Anxiety     Blepharitis, right eye     Colon polyp     Diverticulosis     Dry eyes     Genital herpes     GERD (gastroesophageal reflux disease)     Goiter     Headache     History of transfusion     Hyperlipidemia     Hypertension     IBS (irritable bowel syndrome)     Leg cramps     Osteopenia     Osteoporosis     Pneumonia     Radiculopathy of cervical spine     Skin disorder     Visual impairment     Vulva cancer     Dr. Patel       ==========================================================================    PAST SURGICAL HISTORY    Past Surgical History:   Procedure Laterality Date    BREAST SURGERY      biopsies    CARDIAC CATHETERIZATION       SECTION      COLONOSCOPY      COLONOSCOPY N/A 10/25/2021    Procedure: COLONOSCOPY WITH POLYPECTOMY X11;  Surgeon: Sudarshan Mitchell MD;  Location: [facility] ENDOSCOPY;  Service: Gastroenterology;  Laterality: N/A;  colon polyps, diverticulosis    COLONOSCOPY W/ POLYPECTOMY  N/A 10/28/2020    Procedure: Colonoscopy with polypectomy x 10;  Surgeon: Sudarshan Mitchell MD;  Location: [facility] ENDOSCOPY;  Service: Gastroenterology;  Laterality: N/A;  colon polyps x 10,  hemorrhoids    COSMETIC SURGERY  2022    Ptosis    DILATATION AND CURETTAGE  2009    and 2016    HERNIA REPAIR      LASER ABLATION CONDYLOMA CERVICAL / VULVAR      MOLE REMOVAL  2013    SKIN BIOPSY      SPLENECTOMY  2014    SPLENECTOMY      VARICOSE VEIN SURGERY         ==========================================================================    FAMILY HISTORY    Family History   Problem Relation Age of Onset    Lung cancer Mother     Cancer Mother     Thyroid disease Mother     Coronary artery disease Father     Heart disease Father     Hypertension Father     Early death Father     High cholesterol Father     Diabetes Paternal Grandmother        ==========================================================================    SOCIAL HISTORY    Social History     Socioeconomic History    Marital status: Significant Other    Number of children: 2    Highest education level: Some college, no degree   Tobacco Use    Smoking status: Light Smoker     Packs/day: 0.50     Years: 0.00     Pack years: 0.00     Types: Cigarettes    Smokeless tobacco: Never   Vaping Use    Vaping Use: Never used   Substance and Sexual Activity    Alcohol use: Yes     Alcohol/week: 5.0 standard drinks     Types: 5 Glasses of wine per week     Comment: social    Drug use: No    Sexual activity: Not Currently     Partners: Male       ==========================================================================    MEDICATIONS      Current Outpatient Medications:     acyclovir (ZOVIRAX) 400 MG tablet, TAKE ONE TABLET BY MOUTH DAILY, Disp: 90 tablet, Rfl: 1    acyclovir (Zovirax) 5 % ointment, Apply 1 application topically to the appropriate area as directed Every 4 (Four) Hours. (Patient taking differently: Apply 1 application  topically to the appropriate area  as directed As Needed.), Disp: 15 g, Rfl: 0    betamethasone valerate (VALISONE) 0.1 % ointment, As Needed., Disp: , Rfl:     clobetasol (TEMOVATE) 0.05 % ointment, Apply 1 application  topically to the appropriate area as directed As Needed., Disp: , Rfl:     Collagen-Vitamin C-Biotin (Collagen 1500/C) 500-50-0.8 MG capsule, Take  by mouth., Disp: , Rfl:     cycloSPORINE (RESTASIS) 0.05 % ophthalmic emulsion, Administer 1 drop to both eyes 3 (Three) Times a Day., Disp: , Rfl:     dicyclomine (BENTYL) 20 MG tablet, Take 1 tablet by mouth 2 (Two) Times a Day As Needed (IBS)., Disp: 60 tablet, Rfl: 0    Digestive Enzymes capsule, Take 1 dose by mouth 3 (Three) Times a Day., Disp: , Rfl:     enalapril (VASOTEC) 5 MG tablet, Take 1 tablet by mouth 2 (Two) Times a Day., Disp: 180 tablet, Rfl: 1    fluticasone (FLONASE) 50 MCG/ACT nasal spray, SPRAY TWO SPRAYS IN EACH NOSTRIL ONCE DAILY, Disp: 16 mL, Rfl: 2    hydrocortisone 2.5 % cream, INSERT RECTALLY TWO TIMES A DAY AS DIRECTED BY PRESCRIBER, Disp: 30 g, Rfl: 0    LORazepam (ATIVAN) 1 MG tablet, TAKE 1 TABLET BY MOUTH DAILY AS NEEDED FOR ANXIETY, Disp: 30 tablet, Rfl: 1    Magnesium 200 MG tablet, Take 100 mg by mouth Daily., Disp: , Rfl:     Multiple Vitamins-Minerals (multivitamin with minerals) tablet tablet, Take 1 tablet by mouth Daily., Disp: , Rfl:     polyethylene glycol (MIRALAX) powder powder, Take 17 g by mouth As Needed., Disp: , Rfl:     Probiotic Product (PROBIOTIC-10 PO), Take 1 dose by mouth Daily., Disp: , Rfl:     rosuvastatin (CRESTOR) 5 MG tablet, TAKE ONE TABLET BY MOUTH EVERY NIGHT AT BEDTIME, Disp: 90 tablet, Rfl: 2    vitamin C (ASCORBIC ACID) 500 MG tablet, Take 2 tablets by mouth Daily., Disp: , Rfl:     Hydrocortisone, Perianal, (ANUSOL-HC) 2.5 % rectal cream, INSERT RECTALLY TWO TIMES A DAY AS DIRECTED, Disp: 30 g, Rfl: 0    ==========================================================================    ALLERGIES    Allergies   Allergen  "Reactions    Amoxicillin Hives and Swelling     Hives, swelling    Amoxicillin-Pot Clavulanate Anaphylaxis    Cefoxitin Other (See Comments)     \"blood pressure bottoms out\"    Ciprofloxacin Rash    Contrast Dye (Echo Or Unknown Ct/Mr) Hives, Nausea And Vomiting and Shortness Of Breath     (echo Or Unknown Ct/mr)    Doxycycline Nausea Only    Hydromorphone Confusion and Other (See Comments)    Hydromorphone Hcl Hallucinations and Rash    Iodinated Contrast Media Rash    Propafenone Other (See Comments)     (Rythmol)   Facial numbness    Azithromycin Unknown - High Severity    Cefuroxime GI Intolerance    Cephalexin Unknown - High Severity    Fentanyl Other (See Comments)     Facial numbness      Sulfa Antibiotics Nausea And Vomiting    Sulfacetamide Nausea And Vomiting    Cephalosporins Rash    Latex Rash       ==========================================================================    OBJECTIVE    Vitals:    08/08/23 1352   BP: 142/82   Pulse: 89   SpO2: 97%     Body mass index is 18.54 kg/mý.     General: Alert, cooperative, no acute distress  Thyroid:  no enlargement/tenderness/palpable nodules  Lungs: Clear to auscultation bilaterally, respirations unlabored  Heart: Regular rate and rhythm, S1 and S2 normal, no murmur, rub or gallop  Abdomen: Soft, NT, ND and Bowel sounds Positive  Extremities:  Extremities normal, atraumatic, no cyanosis or edema    ==========================================================================    LAB EVALUATION    Lab Results   Component Value Date    GLUCOSE 93 09/26/2022    BUN 11 09/26/2022    CREATININE 0.85 09/26/2022    EGFRIFNONA 88 07/07/2021    BCR 12.9 09/26/2022    K 3.7 09/26/2022    CO2 26.5 09/26/2022    CALCIUM 9.9 09/26/2022    ALBUMIN 5.00 09/26/2022    LABIL2 1.4 05/21/2020    AST 27 09/26/2022    ALT 16 09/26/2022    CHOL 137 09/26/2022    TRIG 68 09/26/2022    HDL 60 09/26/2022    LDL 63 09/26/2022     Lab Results   Component Value Date    HGBA1C 5.9 (H) " 09/26/2022    HGBA1C 6.0 (H) 07/07/2021    HGBA1C 5.8 (H) 04/06/2021     Lab Results   Component Value Date    CREATININE 0.85 09/26/2022     Lab Results   Component Value Date    TSH 1.750 09/26/2022    FREET4 1.07 07/07/2021       ==========================================================================    ASSESSMENT AND PLAN    Assessment:  - Osteoporosis  - Thyroid Nodule  - Hypertension  - Hyperlipidemia  - Dental work up    Plan:  -Reviewed DEXA scans for the patient from 2016 up till 2023 which shows persistent decline in bone health  - Patient to have T-score less than 3.0 which suggest that patient needs to be started on anabolic therapy  - Patient do have history of exposure to radiation as a kid and therefore anabolic therapy is not a good choice for her  - Also patient have a history of GERD and therefore oral bisphosphonate is not a good choice for her  - Current therapeutic option remains IV bisphosphonate therapy versus Prolia therapy  - Both therapy were discussed patient in detail  - But patient recently had dental workup done which was significantly complicated and currently following periodontist in the clinic and is currently waiting bone healing  - Patient will need verification (addendum) from the periodontist (addendum)  that the bone has healed before I can offer her either IV bisphosphonate or Prolia therapy, she verbalized understanding  - Meanwhile counseled patient to maintain good vitamin D and calcium replacement  - Given underlying history of thyroid nodule and patient currently symptomatic we will check thyroid function and repeat thyroid ultrasound for now  - Plan was discussed with patient in detail to which she verbalized understanding  - Follow-up in my clinic in Feb 2024    Thank you for courtesy of consultation.    Return to clinic:     Entire assessment and plan was discussed and counseled the patient in detail to which patient verbalized understanding and agreed with  "care.  Answered all queries and concerns.    This note was created using voice recognition software and is inherently subject to errors including those of syntax and \"sound-alike\" substitutions which may escape proofreading.  In such instances, original meaning may be extrapolated by contextual derivation.    Note: Portions of this note may have been copied from previous notes but documentation have been reviewed and edited as necessary to support clinical decision making for today's visit.    ==========================================================================    INFORMATION PROVIDED TO PATIENT    Patient Instructions   Please,    - Get blood work done.  - Plan for thyroid ultrasound.  - You can follow-up in my clinic in February 2024 after you get clearance from your   periodontist that you your bone have completely healed and you can be started on either Reclast or Prolia therapy.  - Continue calcium and vitamin D supplementation without any changes for now.    Thank you for your visit today.    If you have any questions or concerns please feel free to reach out of the office.       ==========================================================================  Boston Hitchcock MD  Department of Endocrine, Diabetes and Metabolism  [facility], [place]  ==========================================================================  "

## 2023-08-09 ENCOUNTER — TELEPHONE (OUTPATIENT)
Dept: ENDOCRINOLOGY | Facility: CLINIC | Age: 68
End: 2023-08-09

## 2023-08-09 NOTE — TELEPHONE ENCOUNTER
Caller: Shauna Marks    Relationship to patient: Self    Best call back number: [phone]     Patient is needing: PT WAS IN YESTERDAY, AND RICHELLE RECOMMENDED A THYROID U/S FOR [facility] THEY REQUIRE THIS BE FAXED OVER TO THEM. THEIR FAX [phone]

## 2023-08-09 NOTE — TELEPHONE ENCOUNTER
Caller: Shauna Marks    Relationship: Self    Best call back number: [phone]    What is the best time to reach you: ANYTIME     Who are you requesting to speak with (clinical staff, provider,  specific staff member): MEDICAL ASSISTANT BHARAT       What was the call regarding: PT SAID SHE WOULD LIKE TO SPEAK TO HER ABOUT WHAT THEY TALKED ABOUT YESTERDAY.

## 2023-08-16 ENCOUNTER — TRANSCRIBE ORDERS (OUTPATIENT)
Dept: ADMINISTRATIVE | Facility: HOSPITAL | Age: 68
End: 2023-08-16
Payer: MEDICARE

## 2023-08-16 DIAGNOSIS — I71.43 INFRARENAL ABDOMINAL AORTIC ANEURYSM (AAA) WITHOUT RUPTURE: Primary | ICD-10-CM

## 2023-08-16 DIAGNOSIS — I73.9 PERIPHERAL VASCULAR DISEASE, UNSPECIFIED: ICD-10-CM

## 2023-08-16 DIAGNOSIS — R09.89 BRUIT: ICD-10-CM

## 2023-08-24 ENCOUNTER — OFFICE VISIT (OUTPATIENT)
Dept: FAMILY MEDICINE CLINIC | Facility: CLINIC | Age: 68
End: 2023-08-24
Payer: MEDICARE

## 2023-08-24 VITALS
OXYGEN SATURATION: 97 % | TEMPERATURE: 97.6 F | RESPIRATION RATE: 16 BRPM | HEIGHT: 64 IN | WEIGHT: 107.4 LBS | DIASTOLIC BLOOD PRESSURE: 72 MMHG | SYSTOLIC BLOOD PRESSURE: 112 MMHG | BODY MASS INDEX: 18.34 KG/M2 | HEART RATE: 81 BPM

## 2023-08-24 DIAGNOSIS — M79.89 RIGHT AXILLARY SWELLING: Primary | ICD-10-CM

## 2023-08-24 PROCEDURE — 3078F DIAST BP <80 MM HG: CPT | Performed by: FAMILY MEDICINE

## 2023-08-24 PROCEDURE — 3074F SYST BP LT 130 MM HG: CPT | Performed by: FAMILY MEDICINE

## 2023-08-24 PROCEDURE — 99213 OFFICE O/P EST LOW 20 MIN: CPT | Performed by: FAMILY MEDICINE

## 2023-08-24 NOTE — PROGRESS NOTES
Subjective   Chief Complaint   Patient presents with    Mass     Under Right Axilla     Shauna Marks is a 68 y.o. female.     Patient Care Team:  Talia Grey MD as PCP - General (Family Medicine)  Asa Greenwood MD as Consulting Physician (Vascular Surgery)  Yuliet Segundo MD as Consulting Physician (Hematology and Oncology)  Tasneem Macias S, OD (Optometry)    History of Present Illness  She is coming in today due to some concerns about right axillary area.  She tells me that few days ago she noted a possible small lump which seem to be red at the time.  It also caused some discomfort when she was pressing on it.  Since then it has improved, but she is concerned and wanted this to be checked.     The following portions of the patient's history were reviewed and updated as appropriate: allergies, current medications, past family history, past medical history, past social history, past surgical history, and problem list.  Past Medical History:   Diagnosis Date    Allergic     Anxiety     Blepharitis, right eye     Colon polyp     Diverticulosis     Dry eyes     Genital herpes     GERD (gastroesophageal reflux disease)     Goiter     Headache     History of transfusion     Hyperlipidemia     Hypertension     IBS (irritable bowel syndrome)     Leg cramps     Osteopenia 2015    Osteoporosis     Pneumonia 2019    Radiculopathy of cervical spine     Skin disorder     Visual impairment     Vulva cancer     Dr. Patel     Past Surgical History:   Procedure Laterality Date    BREAST SURGERY      biopsies    CARDIAC CATHETERIZATION       SECTION      COLONOSCOPY      COLONOSCOPY N/A 10/25/2021    Procedure: COLONOSCOPY WITH POLYPECTOMY X11;  Surgeon: Sudarshan Mitchell MD;  Location: [facility] ENDOSCOPY;  Service: Gastroenterology;  Laterality: N/A;  colon polyps, diverticulosis    COLONOSCOPY W/ POLYPECTOMY N/A 10/28/2020    Procedure: Colonoscopy with polypectomy x 10;  Surgeon: Stephen  "Sudarshan Patricio MD;  Location: [facility] ENDOSCOPY;  Service: Gastroenterology;  Laterality: N/A;  colon polyps x 10,  hemorrhoids    COSMETIC SURGERY  2022    Ptosis    DILATATION AND CURETTAGE  2009    and 2016    HERNIA REPAIR      LASER ABLATION CONDYLOMA CERVICAL / VULVAR      MOLE REMOVAL  2013    SKIN BIOPSY      SPLENECTOMY  2014    SPLENECTOMY      VARICOSE VEIN SURGERY       The patient has a family history of  Family History   Problem Relation Age of Onset    Lung cancer Mother     Cancer Mother     Thyroid disease Mother     Coronary artery disease Father     Heart disease Father     Hypertension Father     Early death Father     High cholesterol Father     Diabetes Paternal Grandmother      Social History     Socioeconomic History    Marital status: Significant Other    Number of children: 2    Highest education level: Some college, no degree   Tobacco Use    Smoking status: Light Smoker     Packs/day: 0.50     Years: 0.00     Pack years: 0.00     Types: Cigarettes    Smokeless tobacco: Never   Vaping Use    Vaping Use: Never used   Substance and Sexual Activity    Alcohol use: Yes     Alcohol/week: 5.0 standard drinks     Types: 5 Glasses of wine per week     Comment: social    Drug use: No    Sexual activity: Not Currently     Partners: Male       Review of Systems   Skin:  Positive for skin lesions. Negative for color change, rash and wound.   Visit Vitals  /72 (BP Location: Left arm, Patient Position: Sitting, Cuff Size: Adult)   Pulse 81   Temp 97.6 øF (36.4 øC) (Infrared)   Resp 16   Ht 162.6 cm (64.02\")   Wt 48.7 kg (107 lb 6.4 oz)   LMP  (LMP Unknown)   SpO2 97%   BMI 18.43 kg/mý       BMI is below normal parameters (malnutrition). Recommendations: treating the underlying disease process      Current Outpatient Medications:     acyclovir (ZOVIRAX) 400 MG tablet, TAKE ONE TABLET BY MOUTH DAILY, Disp: 90 tablet, Rfl: 1    acyclovir (Zovirax) 5 % ointment, Apply 1 application topically to the " appropriate area as directed Every 4 (Four) Hours. (Patient taking differently: Apply 1 application  topically to the appropriate area as directed As Needed.), Disp: 15 g, Rfl: 0    betamethasone valerate (VALISONE) 0.1 % ointment, As Needed., Disp: , Rfl:     clobetasol (TEMOVATE) 0.05 % ointment, Apply 1 application  topically to the appropriate area as directed As Needed., Disp: , Rfl:     Collagen-Vitamin C-Biotin (Collagen 1500/C) 500-50-0.8 MG capsule, Take  by mouth., Disp: , Rfl:     cycloSPORINE (RESTASIS) 0.05 % ophthalmic emulsion, Administer 1 drop to both eyes 3 (Three) Times a Day., Disp: , Rfl:     dicyclomine (BENTYL) 20 MG tablet, Take 1 tablet by mouth 2 (Two) Times a Day As Needed (IBS)., Disp: 60 tablet, Rfl: 0    Digestive Enzymes capsule, Take 1 dose by mouth 3 (Three) Times a Day., Disp: , Rfl:     enalapril (VASOTEC) 5 MG tablet, Take 1 tablet by mouth 2 (Two) Times a Day., Disp: 180 tablet, Rfl: 1    fluticasone (FLONASE) 50 MCG/ACT nasal spray, SPRAY TWO SPRAYS IN EACH NOSTRIL ONCE DAILY, Disp: 16 mL, Rfl: 2    hydrocortisone 2.5 % cream, INSERT RECTALLY TWO TIMES A DAY AS DIRECTED BY PRESCRIBER, Disp: 30 g, Rfl: 0    LORazepam (ATIVAN) 1 MG tablet, TAKE 1 TABLET BY MOUTH DAILY AS NEEDED FOR ANXIETY, Disp: 30 tablet, Rfl: 1    Magnesium 200 MG tablet, Take 100 mg by mouth Daily., Disp: , Rfl:     Multiple Vitamins-Minerals (multivitamin with minerals) tablet tablet, Take 1 tablet by mouth Daily., Disp: , Rfl:     polyethylene glycol (MIRALAX) powder powder, Take 17 g by mouth As Needed., Disp: , Rfl:     Probiotic Product (PROBIOTIC-10 PO), Take 1 dose by mouth Daily., Disp: , Rfl:     rosuvastatin (CRESTOR) 5 MG tablet, TAKE ONE TABLET BY MOUTH EVERY NIGHT AT BEDTIME, Disp: 90 tablet, Rfl: 2    vitamin C (ASCORBIC ACID) 500 MG tablet, Take 2 tablets by mouth Daily., Disp: , Rfl:     Objective   Physical Exam  Constitutional:       General: She is not in acute distress.     Appearance:  Normal appearance. She is well-developed. She is not ill-appearing or diaphoretic.      Comments: Patient is in no distress, patient has normal voice and speech.  Normal respiratory effort.   HENT:      Head: Normocephalic and atraumatic.   Pulmonary:      Effort: Pulmonary effort is normal.   Musculoskeletal:      Cervical back: Normal range of motion and neck supple.   Lymphadenopathy:      Comments: Right axillary exam does not show any masses, no lymphadenopathy.  No signs of infection or inflammation.   Neurological:      General: No focal deficit present.      Mental Status: She is alert and oriented to person, place, and time. Mental status is at baseline.   Psychiatric:         Mood and Affect: Mood normal.       Assessment & Plan   Diagnoses and all orders for this visit:    1. Right axillary swelling (Primary)      Her right axillary exam today is intact, I do not appreciate any masses.  Patient was reassured and advised to monitor and contact us back if any concerns.  She may take over-the-counter Tylenol as needed for discomfort.  Her last mammogram was in 12/2022, report was reviewed today and it is scanned in media section of patient's chart.      Return in about 6 weeks (around 10/5/2023) for Medicare Wellness.    Requested Prescriptions      No prescriptions requested or ordered in this encounter

## 2023-09-06 ENCOUNTER — OFFICE (OUTPATIENT)
Dept: URBAN - METROPOLITAN AREA CLINIC 64 | Facility: CLINIC | Age: 68
End: 2023-09-06

## 2023-09-06 VITALS — DIASTOLIC BLOOD PRESSURE: 83 MMHG | SYSTOLIC BLOOD PRESSURE: 118 MMHG | WEIGHT: 108 LBS | HEIGHT: 64 IN

## 2023-09-06 DIAGNOSIS — R14.0 ABDOMINAL DISTENSION (GASEOUS): ICD-10-CM

## 2023-09-06 DIAGNOSIS — K59.00 CONSTIPATION, UNSPECIFIED: ICD-10-CM

## 2023-09-06 DIAGNOSIS — K76.89 OTHER SPECIFIED DISEASES OF LIVER: ICD-10-CM

## 2023-09-06 PROCEDURE — 99213 OFFICE O/P EST LOW 20 MIN: CPT | Performed by: INTERNAL MEDICINE

## 2023-09-06 RX ORDER — POLYETHYLENE GLYCOL 3350 17 G/17G
17 POWDER, FOR SOLUTION ORAL
Qty: 1 | Refills: 11 | Status: ACTIVE
Start: 2023-09-06

## 2023-09-06 RX ORDER — LUBIPROSTONE 8 UG/1
16 CAPSULE, GELATIN COATED ORAL
Qty: 60 | Refills: 11 | Status: COMPLETED
Start: 2023-09-06 | End: 2024-06-17

## 2023-09-08 ENCOUNTER — OFFICE VISIT (OUTPATIENT)
Dept: FAMILY MEDICINE CLINIC | Facility: CLINIC | Age: 68
End: 2023-09-08
Payer: MEDICARE

## 2023-09-08 VITALS
RESPIRATION RATE: 16 BRPM | OXYGEN SATURATION: 98 % | SYSTOLIC BLOOD PRESSURE: 135 MMHG | TEMPERATURE: 97.2 F | BODY MASS INDEX: 18.23 KG/M2 | DIASTOLIC BLOOD PRESSURE: 79 MMHG | WEIGHT: 106.8 LBS | HEIGHT: 64 IN | HEART RATE: 79 BPM

## 2023-09-08 DIAGNOSIS — R21 RASH AND NONSPECIFIC SKIN ERUPTION: Primary | ICD-10-CM

## 2023-09-08 PROCEDURE — 1159F MED LIST DOCD IN RCRD: CPT | Performed by: FAMILY MEDICINE

## 2023-09-08 PROCEDURE — 3075F SYST BP GE 130 - 139MM HG: CPT | Performed by: FAMILY MEDICINE

## 2023-09-08 PROCEDURE — 99213 OFFICE O/P EST LOW 20 MIN: CPT | Performed by: FAMILY MEDICINE

## 2023-09-08 PROCEDURE — 1160F RVW MEDS BY RX/DR IN RCRD: CPT | Performed by: FAMILY MEDICINE

## 2023-09-08 PROCEDURE — 3078F DIAST BP <80 MM HG: CPT | Performed by: FAMILY MEDICINE

## 2023-09-08 RX ORDER — TRIAMCINOLONE ACETONIDE 1 MG/G
CREAM TOPICAL
COMMUNITY
Start: 2023-08-29

## 2023-09-08 RX ORDER — PREDNISONE 10 MG/1
TABLET ORAL
Qty: 9 TABLET | Refills: 0 | Status: SHIPPED | OUTPATIENT
Start: 2023-09-08

## 2023-09-08 NOTE — PROGRESS NOTES
Subjective   Shauna Marks is a 68 y.o. female.     Rash  This is a new problem. The current episode started in the past 7 days. The affected locations include the scalp, neck, left shoulder and right lower leg. The rash is characterized by burning and itchiness. She was exposed to plant contact. Pertinent negatives include no cough, facial edema, fever, joint pain, rhinorrhea, shortness of breath or sore throat. Past treatments include nothing.      The following portions of the patient's history were reviewed and updated as appropriate: past medical history, past social history, past surgical history and problem list.    Review of Systems   Constitutional:  Negative for fever.   HENT:  Negative for rhinorrhea and sore throat.    Respiratory:  Negative for cough and shortness of breath.    Musculoskeletal:  Negative for joint pain.   Skin:  Positive for rash.     Objective   Physical Exam  Constitutional:       General: She is not in acute distress.  Pulmonary:      Breath sounds: Normal breath sounds. No wheezing.   Skin:     Findings: No rash.      Comments: Rash noted on neck, left shoulder, right lower leg and scalp   Neurological:      Mental Status: She is alert.     Vitals:    09/08/23 1109   BP: 135/79   Pulse: 79   Resp: 16   Temp: 97.2 °F (36.2 °C)   SpO2: 98%     Current Outpatient Medications on File Prior to Visit   Medication Sig Dispense Refill    acyclovir (ZOVIRAX) 400 MG tablet TAKE ONE TABLET BY MOUTH DAILY 90 tablet 1    acyclovir (Zovirax) 5 % ointment Apply 1 application topically to the appropriate area as directed Every 4 (Four) Hours. (Patient taking differently: Apply 1 application  topically to the appropriate area as directed As Needed.) 15 g 0    betamethasone valerate (VALISONE) 0.1 % ointment As Needed.      clobetasol (TEMOVATE) 0.05 % ointment Apply 1 application  topically to the appropriate area as directed As Needed.      Collagen-Vitamin C-Biotin (Collagen 1500/C) 500-50-0.8  MG capsule Take  by mouth.      cycloSPORINE (RESTASIS) 0.05 % ophthalmic emulsion Administer 1 drop to both eyes 3 (Three) Times a Day.      dicyclomine (BENTYL) 20 MG tablet Take 1 tablet by mouth 2 (Two) Times a Day As Needed (IBS). 60 tablet 0    Digestive Enzymes capsule Take 1 dose by mouth 3 (Three) Times a Day.      enalapril (VASOTEC) 5 MG tablet Take 1 tablet by mouth 2 (Two) Times a Day. 180 tablet 1    fluticasone (FLONASE) 50 MCG/ACT nasal spray SPRAY TWO SPRAYS IN EACH NOSTRIL ONCE DAILY 16 mL 2    hydrocortisone 2.5 % cream INSERT RECTALLY TWO TIMES A DAY AS DIRECTED BY PRESCRIBER 30 g 0    LORazepam (ATIVAN) 1 MG tablet TAKE 1 TABLET BY MOUTH DAILY AS NEEDED FOR ANXIETY 30 tablet 1    Magnesium 200 MG tablet Take 100 mg by mouth Daily.      Multiple Vitamins-Minerals (multivitamin with minerals) tablet tablet Take 1 tablet by mouth Daily.      polyethylene glycol (MIRALAX) powder powder Take 17 g by mouth As Needed.      Probiotic Product (PROBIOTIC-10 PO) Take 1 dose by mouth Daily.      rosuvastatin (CRESTOR) 5 MG tablet TAKE ONE TABLET BY MOUTH EVERY NIGHT AT BEDTIME 90 tablet 2    triamcinolone (KENALOG) 0.1 % cream       vitamin C (ASCORBIC ACID) 500 MG tablet Take 2 tablets by mouth Daily.       No current facility-administered medications on file prior to visit.           Assessment & Plan   Problems Addressed this Visit          Skin    Rash and nonspecific skin eruption - Primary    Relevant Medications    triamcinolone (KENALOG) 0.1 % cream    predniSONE (DELTASONE) 10 MG tablet     Diagnoses         Codes Comments    Rash and nonspecific skin eruption    -  Primary ICD-10-CM: R21  ICD-9-CM: 782.1

## 2023-09-15 ENCOUNTER — OFFICE VISIT (OUTPATIENT)
Dept: FAMILY MEDICINE CLINIC | Facility: CLINIC | Age: 68
End: 2023-09-15
Payer: MEDICARE

## 2023-09-15 VITALS
TEMPERATURE: 97.5 F | HEART RATE: 85 BPM | WEIGHT: 106 LBS | SYSTOLIC BLOOD PRESSURE: 148 MMHG | OXYGEN SATURATION: 99 % | BODY MASS INDEX: 18.1 KG/M2 | DIASTOLIC BLOOD PRESSURE: 80 MMHG | HEIGHT: 64 IN | RESPIRATION RATE: 16 BRPM

## 2023-09-15 DIAGNOSIS — L73.9 FOLLICULITIS: Primary | ICD-10-CM

## 2023-09-15 PROCEDURE — 3077F SYST BP >= 140 MM HG: CPT | Performed by: FAMILY MEDICINE

## 2023-09-15 PROCEDURE — 99213 OFFICE O/P EST LOW 20 MIN: CPT | Performed by: FAMILY MEDICINE

## 2023-09-15 PROCEDURE — 3079F DIAST BP 80-89 MM HG: CPT | Performed by: FAMILY MEDICINE

## 2023-09-15 RX ORDER — DOXYCYCLINE HYCLATE 50 MG/1
50 CAPSULE ORAL 2 TIMES DAILY
Qty: 14 CAPSULE | Refills: 0 | Status: SHIPPED | OUTPATIENT
Start: 2023-09-15

## 2023-09-15 NOTE — PROGRESS NOTES
Subjective   Chief Complaint   Patient presents with    shoulder     Lump getting larger in size     Shauna Marks is a 68 y.o. female.     Patient Care Team:  Talia Grey MD as PCP - General (Family Medicine)  Asa Greenwood MD as Consulting Physician (Vascular Surgery)  Yuliet Segundo MD as Consulting Physician (Hematology and Oncology)  Tasneem Macias, MARTI (Optometry)    History of Present Illness  She is coming in today for the reevaluation of the skin concerns/lump on the left shoulder area which she has had there for over a week.  She was seen by another physician in our office 1 week ago and at that time she was started on prednisone pills.  She has noted then lump is rather getting larger in size and causes a lot of soreness.  She noted that her temperature was elevated yesterday at 99 degrees.     The following portions of the patient's history were reviewed and updated as appropriate: allergies, current medications, past family history, past medical history, past social history, past surgical history, and problem list.  Past Medical History:   Diagnosis Date    Allergic     Anxiety     Blepharitis, right eye     Colon polyp     Diverticulosis     Dry eyes     Genital herpes     GERD (gastroesophageal reflux disease)     Goiter     Headache     History of transfusion     Hyperlipidemia     Hypertension     IBS (irritable bowel syndrome)     Leg cramps     Osteopenia 2015    Osteoporosis     Pneumonia 2019    Radiculopathy of cervical spine     Skin disorder     Visual impairment     Vulva cancer     Dr. Patel     Past Surgical History:   Procedure Laterality Date    BREAST SURGERY      biopsies    CARDIAC CATHETERIZATION       SECTION      COLONOSCOPY      COLONOSCOPY N/A 10/25/2021    Procedure: COLONOSCOPY WITH POLYPECTOMY X11;  Surgeon: Sudarshan Mitchell MD;  Location: [facility] ENDOSCOPY;  Service: Gastroenterology;  Laterality: N/A;  colon polyps, diverticulosis     "COLONOSCOPY W/ POLYPECTOMY N/A 10/28/2020    Procedure: Colonoscopy with polypectomy x 10;  Surgeon: Sudarshan Mitchell MD;  [facility];  Service: Gastroenterology;  Laterality: N/A;  colon polyps x 10,  hemorrhoids    COSMETIC SURGERY  2022    Ptosis    DILATATION AND CURETTAGE  2009    and 2016    HERNIA REPAIR      LASER ABLATION CONDYLOMA CERVICAL / VULVAR      MOLE REMOVAL  2013    SKIN BIOPSY      SPLENECTOMY  2014    SPLENECTOMY      VARICOSE VEIN SURGERY       The patient has a family history of  Family History   Problem Relation Age of Onset    Lung cancer Mother     Cancer Mother     Thyroid disease Mother     Coronary artery disease Father     Heart disease Father     Hypertension Father     Early death Father     High cholesterol Father     Diabetes Paternal Grandmother      Social History     Socioeconomic History    Marital status: Significant Other    Number of children: 2    Highest education level: Some college, no degree   Tobacco Use    Smoking status: Light Smoker     Packs/day: 0.50     Years: 0.00     Pack years: 0.00     Types: Cigarettes    Smokeless tobacco: Never   Vaping Use    Vaping Use: Never used   Substance and Sexual Activity    Alcohol use: Yes     Alcohol/week: 5.0 standard drinks     Types: 5 Glasses of wine per week     Comment: social    Drug use: No    Sexual activity: Not Currently     Partners: Male       Review of Systems   Constitutional:  Negative for chills and fever.   Skin:  Positive for color change and skin lesions. Negative for rash.   Visit Vitals  /80 (BP Location: Left arm, Patient Position: Sitting, Cuff Size: Adult)   Pulse 85   Temp 97.5 °F (36.4 °C) (Infrared)   Resp 16   Ht 162.6 cm (64.02\")   Wt 48.1 kg (106 lb)   LMP  (LMP Unknown)   SpO2 99%   BMI 18.19 kg/m²              Current Outpatient Medications:     acyclovir (ZOVIRAX) 400 MG tablet, TAKE ONE TABLET BY MOUTH DAILY, Disp: 90 tablet, Rfl: 1    acyclovir (Zovirax) 5 % " ointment, Apply 1 application topically to the appropriate area as directed Every 4 (Four) Hours. (Patient taking differently: Apply 1 application  topically to the appropriate area as directed As Needed.), Disp: 15 g, Rfl: 0    betamethasone valerate (VALISONE) 0.1 % ointment, As Needed., Disp: , Rfl:     clobetasol (TEMOVATE) 0.05 % ointment, Apply 1 application  topically to the appropriate area as directed As Needed., Disp: , Rfl:     Collagen-Vitamin C-Biotin (Collagen 1500/C) 500-50-0.8 MG capsule, Take  by mouth., Disp: , Rfl:     cycloSPORINE (RESTASIS) 0.05 % ophthalmic emulsion, Administer 1 drop to both eyes 3 (Three) Times a Day., Disp: , Rfl:     dicyclomine (BENTYL) 20 MG tablet, Take 1 tablet by mouth 2 (Two) Times a Day As Needed (IBS)., Disp: 60 tablet, Rfl: 0    Digestive Enzymes capsule, Take 1 dose by mouth 3 (Three) Times a Day., Disp: , Rfl:     doxycycline (VIBRAMYCIN) 50 MG capsule, Take 1 capsule by mouth 2 (Two) Times a Day., Disp: 14 capsule, Rfl: 0    enalapril (VASOTEC) 5 MG tablet, Take 1 tablet by mouth 2 (Two) Times a Day., Disp: 180 tablet, Rfl: 1    fluticasone (FLONASE) 50 MCG/ACT nasal spray, SPRAY TWO SPRAYS IN EACH NOSTRIL ONCE DAILY, Disp: 16 mL, Rfl: 2    hydrocortisone 2.5 % cream, INSERT RECTALLY TWO TIMES A DAY AS DIRECTED BY PRESCRIBER, Disp: 30 g, Rfl: 0    LORazepam (ATIVAN) 1 MG tablet, TAKE 1 TABLET BY MOUTH DAILY AS NEEDED FOR ANXIETY, Disp: 30 tablet, Rfl: 1    Magnesium 200 MG tablet, Take 100 mg by mouth Daily., Disp: , Rfl:     Multiple Vitamins-Minerals (multivitamin with minerals) tablet tablet, Take 1 tablet by mouth Daily., Disp: , Rfl:     polyethylene glycol (MIRALAX) powder powder, Take 17 g by mouth As Needed., Disp: , Rfl:     predniSONE (DELTASONE) 10 MG tablet, Take 1 tab bid for 3 days then 1 tab daily for 3 days., Disp: 9 tablet, Rfl: 0    Probiotic Product (PROBIOTIC-10 PO), Take 1 dose by mouth Daily., Disp: , Rfl:     rosuvastatin (CRESTOR) 5 MG  tablet, TAKE ONE TABLET BY MOUTH EVERY NIGHT AT BEDTIME, Disp: 90 tablet, Rfl: 2    triamcinolone (KENALOG) 0.1 % cream, , Disp: , Rfl:     vitamin C (ASCORBIC ACID) 500 MG tablet, Take 2 tablets by mouth Daily., Disp: , Rfl:     Objective   Physical Exam  Constitutional:       General: She is not in acute distress.     Appearance: Normal appearance. She is well-developed. She is not ill-appearing or diaphoretic.      Comments: Patient is in no distress, patient has normal voice and speech.  Normal respiratory effort.   HENT:      Head: Normocephalic and atraumatic.   Pulmonary:      Effort: Pulmonary effort is normal.   Musculoskeletal:      Cervical back: Normal range of motion and neck supple.   Skin:     Comments: There is a soft tissue swelling noted on the top of the left shoulder area, it is about 2 cm in diameter, it is tender to touch, no fluctuation noted.  No drainage or bleeding.   Neurological:      General: No focal deficit present.      Mental Status: She is alert and oriented to person, place, and time. Mental status is at baseline.   Psychiatric:         Mood and Affect: Mood normal.       Assessment & Plan   Diagnoses and all orders for this visit:    1. Folliculitis (Primary)  -     doxycycline (VIBRAMYCIN) 50 MG capsule; Take 1 capsule by mouth 2 (Two) Times a Day.  Dispense: 14 capsule; Refill: 0      I will be starting her on doxycycline.  Patient is to monitor the area closely and contact us back if any concerns or no improvement.      Return if symptoms worsen or fail to improve, for Recheck.    Requested Prescriptions     Signed Prescriptions Disp Refills    doxycycline (VIBRAMYCIN) 50 MG capsule 14 capsule 0     Sig: Take 1 capsule by mouth 2 (Two) Times a Day.

## 2023-09-18 DIAGNOSIS — F41.9 ANXIETY: ICD-10-CM

## 2023-09-18 RX ORDER — LORAZEPAM 1 MG/1
TABLET ORAL
Qty: 30 TABLET | Refills: 1 | Status: SHIPPED | OUTPATIENT
Start: 2023-09-18

## 2023-09-20 DIAGNOSIS — R21 RASH AND NONSPECIFIC SKIN ERUPTION: ICD-10-CM

## 2023-09-20 DIAGNOSIS — L73.9 FOLLICULITIS: ICD-10-CM

## 2023-09-20 RX ORDER — DOXYCYCLINE HYCLATE 50 MG/1
50 CAPSULE ORAL 2 TIMES DAILY
Qty: 14 CAPSULE | Refills: 0 | Status: SHIPPED | OUTPATIENT
Start: 2023-09-20

## 2023-09-29 ENCOUNTER — TELEPHONE (OUTPATIENT)
Dept: FAMILY MEDICINE CLINIC | Facility: CLINIC | Age: 68
End: 2023-09-29
Payer: MEDICARE

## 2023-09-29 DIAGNOSIS — R73.03 PREDIABETES: ICD-10-CM

## 2023-09-29 DIAGNOSIS — Z11.59 NEED FOR HEPATITIS C SCREENING TEST: ICD-10-CM

## 2023-09-29 DIAGNOSIS — E55.9 VITAMIN D DEFICIENCY: ICD-10-CM

## 2023-09-29 DIAGNOSIS — I10 ESSENTIAL HYPERTENSION: Primary | ICD-10-CM

## 2023-09-29 DIAGNOSIS — E78.5 DYSLIPIDEMIA: ICD-10-CM

## 2023-09-29 NOTE — TELEPHONE ENCOUNTER
PATIENT HAS A LAB APPT ON 10/12/23. I SEE A URINALYSIS IN HER CHART, IS THIS ALL THAT IS NEEDED? PLEASE ADVISE, THANK YOU!

## 2023-10-04 DIAGNOSIS — I10 ESSENTIAL HYPERTENSION: Primary | ICD-10-CM

## 2023-10-12 ENCOUNTER — LAB (OUTPATIENT)
Dept: FAMILY MEDICINE CLINIC | Facility: CLINIC | Age: 68
End: 2023-10-12
Payer: MEDICARE

## 2023-10-12 ENCOUNTER — TELEPHONE (OUTPATIENT)
Dept: FAMILY MEDICINE CLINIC | Facility: CLINIC | Age: 68
End: 2023-10-12
Payer: MEDICARE

## 2023-10-12 DIAGNOSIS — F17.200 TOBACCO DEPENDENCE: Primary | ICD-10-CM

## 2023-10-12 DIAGNOSIS — Z87.891 PERSONAL HISTORY OF NICOTINE DEPENDENCE: ICD-10-CM

## 2023-10-12 LAB
25(OH)D3 SERPL-MCNC: 39.7 NG/ML (ref 30–100)
ALBUMIN SERPL-MCNC: 4.9 G/DL (ref 3.5–5.2)
ALBUMIN/GLOB SERPL: 1.6 G/DL
ALP SERPL-CCNC: 74 U/L (ref 39–117)
ALT SERPL W P-5'-P-CCNC: 19 U/L (ref 1–33)
ANION GAP SERPL CALCULATED.3IONS-SCNC: 12 MMOL/L (ref 5–15)
AST SERPL-CCNC: 33 U/L (ref 1–32)
BASOPHILS # BLD AUTO: 0.14 10*3/MM3 (ref 0–0.2)
BASOPHILS NFR BLD AUTO: 1 % (ref 0–1.5)
BILIRUB SERPL-MCNC: 0.5 MG/DL (ref 0–1.2)
BILIRUB UR QL STRIP: NEGATIVE
BUN SERPL-MCNC: 14 MG/DL (ref 8–23)
BUN/CREAT SERPL: 15.1 (ref 7–25)
CALCIUM SPEC-SCNC: 10.1 MG/DL (ref 8.6–10.5)
CHLORIDE SERPL-SCNC: 99 MMOL/L (ref 98–107)
CHOLEST SERPL-MCNC: 149 MG/DL (ref 0–200)
CLARITY UR: CLEAR
CO2 SERPL-SCNC: 26 MMOL/L (ref 22–29)
COLOR UR: YELLOW
CREAT SERPL-MCNC: 0.93 MG/DL (ref 0.57–1)
DEPRECATED RDW RBC AUTO: 43.7 FL (ref 37–54)
EGFRCR SERPLBLD CKD-EPI 2021: 66.7 ML/MIN/1.73
EOSINOPHIL # BLD AUTO: 0.32 10*3/MM3 (ref 0–0.4)
EOSINOPHIL NFR BLD AUTO: 2.4 % (ref 0.3–6.2)
ERYTHROCYTE [DISTWIDTH] IN BLOOD BY AUTOMATED COUNT: 12.5 % (ref 12.3–15.4)
GLOBULIN UR ELPH-MCNC: 3 GM/DL
GLUCOSE SERPL-MCNC: 91 MG/DL (ref 65–99)
GLUCOSE UR STRIP-MCNC: NEGATIVE MG/DL
HBA1C MFR BLD: 6 % (ref 4.8–5.6)
HCT VFR BLD AUTO: 43.5 % (ref 34–46.6)
HCV AB SER DONR QL: NORMAL
HDLC SERPL-MCNC: 60 MG/DL (ref 40–60)
HGB BLD-MCNC: 14.8 G/DL (ref 12–15.9)
HGB UR QL STRIP.AUTO: NEGATIVE
HOLD SPECIMEN: NORMAL
IMM GRANULOCYTES # BLD AUTO: 0.04 10*3/MM3 (ref 0–0.05)
IMM GRANULOCYTES NFR BLD AUTO: 0.3 % (ref 0–0.5)
KETONES UR QL STRIP: ABNORMAL
LDLC SERPL CALC-MCNC: 72 MG/DL (ref 0–100)
LDLC/HDLC SERPL: 1.19 {RATIO}
LEUKOCYTE ESTERASE UR QL STRIP.AUTO: NEGATIVE
LYMPHOCYTES # BLD AUTO: 3.47 10*3/MM3 (ref 0.7–3.1)
LYMPHOCYTES NFR BLD AUTO: 26 % (ref 19.6–45.3)
MCH RBC QN AUTO: 32.5 PG (ref 26.6–33)
MCHC RBC AUTO-ENTMCNC: 34 G/DL (ref 31.5–35.7)
MCV RBC AUTO: 95.6 FL (ref 79–97)
MONOCYTES # BLD AUTO: 1.04 10*3/MM3 (ref 0.1–0.9)
MONOCYTES NFR BLD AUTO: 7.8 % (ref 5–12)
NEUTROPHILS NFR BLD AUTO: 62.5 % (ref 42.7–76)
NEUTROPHILS NFR BLD AUTO: 8.33 10*3/MM3 (ref 1.7–7)
NITRITE UR QL STRIP: NEGATIVE
NRBC BLD AUTO-RTO: 0 /100 WBC (ref 0–0.2)
PH UR STRIP.AUTO: 7.5 [PH] (ref 5–8)
PLATELET # BLD AUTO: 293 10*3/MM3 (ref 140–450)
PMV BLD AUTO: 12.8 FL (ref 6–12)
POTASSIUM SERPL-SCNC: 3.9 MMOL/L (ref 3.5–5.2)
PROT SERPL-MCNC: 7.9 G/DL (ref 6–8.5)
PROT UR QL STRIP: NEGATIVE
RBC # BLD AUTO: 4.55 10*6/MM3 (ref 3.77–5.28)
SODIUM SERPL-SCNC: 137 MMOL/L (ref 136–145)
SP GR UR STRIP: 1.01 (ref 1–1.03)
TRIGL SERPL-MCNC: 89 MG/DL (ref 0–150)
UROBILINOGEN UR QL STRIP: ABNORMAL
VIT B12 BLD-MCNC: 1163 PG/ML (ref 211–946)
VLDLC SERPL-MCNC: 17 MG/DL (ref 5–40)
WBC NRBC COR # BLD: 13.34 10*3/MM3 (ref 3.4–10.8)

## 2023-10-12 PROCEDURE — 82607 VITAMIN B-12: CPT | Performed by: FAMILY MEDICINE

## 2023-10-12 PROCEDURE — 82306 VITAMIN D 25 HYDROXY: CPT | Performed by: FAMILY MEDICINE

## 2023-10-12 PROCEDURE — 80061 LIPID PANEL: CPT | Performed by: FAMILY MEDICINE

## 2023-10-12 PROCEDURE — 83036 HEMOGLOBIN GLYCOSYLATED A1C: CPT | Performed by: FAMILY MEDICINE

## 2023-10-12 PROCEDURE — 85025 COMPLETE CBC W/AUTO DIFF WBC: CPT | Performed by: FAMILY MEDICINE

## 2023-10-12 PROCEDURE — 81003 URINALYSIS AUTO W/O SCOPE: CPT | Performed by: FAMILY MEDICINE

## 2023-10-12 PROCEDURE — 80053 COMPREHEN METABOLIC PANEL: CPT | Performed by: FAMILY MEDICINE

## 2023-10-12 PROCEDURE — 86803 HEPATITIS C AB TEST: CPT | Performed by: FAMILY MEDICINE

## 2023-10-12 NOTE — TELEPHONE ENCOUNTER
PT RECEIVED LETTER FROM [facility] SAYING IT'S TIME FOR HER LOW DOSE SCREENING LUNG CT. COULD YOU PLEASE PUT THESE ORDERS IN.

## 2023-10-19 ENCOUNTER — OFFICE VISIT (OUTPATIENT)
Dept: FAMILY MEDICINE CLINIC | Facility: CLINIC | Age: 68
End: 2023-10-19
Payer: MEDICARE

## 2023-10-19 VITALS
WEIGHT: 107 LBS | DIASTOLIC BLOOD PRESSURE: 65 MMHG | HEART RATE: 83 BPM | TEMPERATURE: 97.5 F | HEIGHT: 64 IN | RESPIRATION RATE: 16 BRPM | OXYGEN SATURATION: 97 % | SYSTOLIC BLOOD PRESSURE: 118 MMHG | BODY MASS INDEX: 18.27 KG/M2

## 2023-10-19 DIAGNOSIS — Z00.00 MEDICARE ANNUAL WELLNESS VISIT, SUBSEQUENT: Primary | ICD-10-CM

## 2023-10-19 DIAGNOSIS — Z23 NEED FOR VACCINATION: ICD-10-CM

## 2023-10-19 PROCEDURE — 90662 IIV NO PRSV INCREASED AG IM: CPT | Performed by: FAMILY MEDICINE

## 2023-10-19 PROCEDURE — 1170F FXNL STATUS ASSESSED: CPT | Performed by: FAMILY MEDICINE

## 2023-10-19 PROCEDURE — 3074F SYST BP LT 130 MM HG: CPT | Performed by: FAMILY MEDICINE

## 2023-10-19 PROCEDURE — G0008 ADMIN INFLUENZA VIRUS VAC: HCPCS | Performed by: FAMILY MEDICINE

## 2023-10-19 PROCEDURE — G0439 PPPS, SUBSEQ VISIT: HCPCS | Performed by: FAMILY MEDICINE

## 2023-10-19 PROCEDURE — 3078F DIAST BP <80 MM HG: CPT | Performed by: FAMILY MEDICINE

## 2023-10-19 NOTE — PROGRESS NOTES
Subsequent Medicare Wellness Visit   The ABC's of the Annual Wellness Visit    Chief Complaint   Patient presents with    Medicare Wellness-subsequent       HPI:  Shauna Marks, -1955, is a 68 y.o. female who presents for a Subsequent Medicare Wellness Visit.  She lives with her significant other.  She is fully independent with her activities of daily living.  She has been under a lot of stress for quite some time due to medical issues of her spouse.  She has been experiencing some fatigue and she wonders if this might be stress related.  She recently had fasting blood work done and these results are being discussed and reviewed today.  She is a smoker, she smokes few cigarettes a day. Patient does not report any chest pain, shortness of breath, dizziness, nausea, vomiting, or diarrhea, visual issues, headaches, numbness or tingling. No urinary issues reported like urgency, frequency, or discomfort upon urination.  No significant weight changes reported.  No swelling reported.  No rashes or any other skin issues reported.     Recent Hospitalizations:  No hospitalization(s) within the last year..    Current Medical Providers:  Patient Care Team:  Talia Grey MD as PCP - General (Family Medicine)  Asa Greenwood MD as Consulting Physician (Vascular Surgery)  Yuliet Segundo MD as Consulting Physician (Hematology and Oncology)  Tasneem Macias OD (Optometry)    Health Habits and Functional and Cognitive Screening and Depression Screening:      10/19/2023     2:46 PM   Functional & Cognitive Status   Do you have difficulty preparing food and eating? No   Do you have difficulty bathing yourself, getting dressed or grooming yourself? No   Do you have difficulty using the toilet? No   Do you have difficulty moving around from place to place? No   Do you have trouble with steps or getting out of a bed or a chair? No   Current Diet Well Balanced Diet   Dental Exam Up to date   Eye Exam Up to date  "  Exercise (times per week) 0 times per week   Current Exercises Include No Regular Exercise   Do you need help using the phone?  No   Are you deaf or do you have serious difficulty hearing?  No   Do you need help to go to places out of walking distance? No   Do you need help shopping? No   Do you need help preparing meals?  No   Do you need help with housework?  No   Do you need help with laundry? No   Do you need help taking your medications? No   Do you need help managing money? No   Do you ever drive or ride in a car without wearing a seat belt? No   Have you felt unusual stress, anger or loneliness in the last month? No   Who do you live with? Spouse   If you need help, do you have trouble finding someone available to you? No   Do you have difficulty concentrating, remembering or making decisions? No       Compared to one year ago, the patient feels her physical health is the same and her mental health is the same.    Depression Screen:      10/19/2023     2:46 PM   PHQ-2/PHQ-9 Depression Screening   Little Interest or Pleasure in Doing Things 0-->not at all   Feeling Down, Depressed or Hopeless 0-->not at all   PHQ-9: Brief Depression Severity Measure Score 0         Past Medical/Family/Social History:  The following portions of the patient's history were reviewed and updated as appropriate: allergies, current medications, past family history, past medical history, past social history, past surgical history, and problem list.    Allergies   Allergen Reactions    Amoxicillin Hives and Swelling     Hives, swelling    Amoxicillin-Pot Clavulanate Anaphylaxis    Cefoxitin Other (See Comments)     \"blood pressure bottoms out\"    Ciprofloxacin Rash    Contrast Dye (Echo Or Unknown Ct/Mr) Hives, Nausea And Vomiting and Shortness Of Breath     (echo Or Unknown Ct/mr)    Doxycycline Nausea Only    Hydromorphone Confusion and Other (See Comments)    Hydromorphone Hcl Hallucinations and Rash    Iodinated Contrast Media " Rash    Propafenone Other (See Comments)     (Rythmol)   Facial numbness    Azithromycin Unknown - High Severity    Cefuroxime GI Intolerance    Cephalexin Unknown - High Severity    Fentanyl Other (See Comments)     Facial numbness      Sulfa Antibiotics Nausea And Vomiting    Sulfacetamide Nausea And Vomiting    Cephalosporins Rash    Latex Rash         Current Outpatient Medications:     acyclovir (ZOVIRAX) 400 MG tablet, TAKE ONE TABLET BY MOUTH DAILY, Disp: 90 tablet, Rfl: 1    acyclovir (Zovirax) 5 % ointment, Apply 1 application topically to the appropriate area as directed Every 4 (Four) Hours. (Patient taking differently: Apply 1 application  topically to the appropriate area as directed As Needed.), Disp: 15 g, Rfl: 0    betamethasone valerate (VALISONE) 0.1 % ointment, As Needed., Disp: , Rfl:     clobetasol (TEMOVATE) 0.05 % ointment, Apply 1 application  topically to the appropriate area as directed As Needed., Disp: , Rfl:     Collagen-Vitamin C-Biotin (Collagen 1500/C) 500-50-0.8 MG capsule, Take  by mouth., Disp: , Rfl:     cycloSPORINE (RESTASIS) 0.05 % ophthalmic emulsion, Administer 1 drop to both eyes 3 (Three) Times a Day., Disp: , Rfl:     dicyclomine (BENTYL) 20 MG tablet, Take 1 tablet by mouth 2 (Two) Times a Day As Needed (IBS)., Disp: 60 tablet, Rfl: 0    Digestive Enzymes capsule, Take 1 dose by mouth 3 (Three) Times a Day., Disp: , Rfl:     doxycycline (VIBRAMYCIN) 50 MG capsule, Take 1 capsule by mouth 2 (Two) Times a Day., Disp: 14 capsule, Rfl: 0    enalapril (VASOTEC) 5 MG tablet, Take 1 tablet by mouth 2 (Two) Times a Day., Disp: 180 tablet, Rfl: 1    fluticasone (FLONASE) 50 MCG/ACT nasal spray, SPRAY TWO SPRAYS IN EACH NOSTRIL ONCE DAILY, Disp: 16 mL, Rfl: 2    hydrocortisone 2.5 % cream, INSERT RECTALLY TWO TIMES A DAY AS DIRECTED BY PRESCRIBER, Disp: 30 g, Rfl: 0    LORazepam (ATIVAN) 1 MG tablet, TAKE 1 TABLET BY MOUTH DAILY AS NEEDED FOR ANXIETY, Disp: 30 tablet, Rfl: 1     Magnesium 200 MG tablet, Take 100 mg by mouth Daily., Disp: , Rfl:     Multiple Vitamins-Minerals (multivitamin with minerals) tablet tablet, Take 1 tablet by mouth Daily., Disp: , Rfl:     polyethylene glycol (MIRALAX) powder powder, Take 17 g by mouth As Needed., Disp: , Rfl:     Probiotic Product (PROBIOTIC-10 PO), Take 1 dose by mouth Daily., Disp: , Rfl:     rosuvastatin (CRESTOR) 5 MG tablet, TAKE ONE TABLET BY MOUTH EVERY NIGHT AT BEDTIME, Disp: 90 tablet, Rfl: 2    triamcinolone (KENALOG) 0.1 % cream, , Disp: , Rfl:     vitamin C (ASCORBIC ACID) 500 MG tablet, Take 2 tablets by mouth Daily., Disp: , Rfl:     Aspirin use counseling: Does not need ASA (and currently is not on it)    Current medication list contains no high risk medications.  No harmful drug interactions have been identified.     Family History   Problem Relation Age of Onset    Lung cancer Mother     Cancer Mother     Thyroid disease Mother     Coronary artery disease Father     Heart disease Father     Hypertension Father     Early death Father     High cholesterol Father     Diabetes Paternal Grandmother        Social History     Tobacco Use    Smoking status: Light Smoker     Packs/day: 0.50     Years: 0.00     Additional pack years: 0.00     Total pack years: 0.00     Types: Cigarettes    Smokeless tobacco: Never   Substance Use Topics    Alcohol use: Yes     Alcohol/week: 5.0 standard drinks of alcohol     Types: 5 Glasses of wine per week     Comment: social       Past Surgical History:   Procedure Laterality Date    BREAST SURGERY      biopsies    CARDIAC CATHETERIZATION       SECTION      COLONOSCOPY      COLONOSCOPY N/A 10/25/2021    Procedure: COLONOSCOPY WITH POLYPECTOMY X11;  Surgeon: Sudarshan Mitchell MD;  Location: [facility] ENDOSCOPY;  Service: Gastroenterology;  Laterality: N/A;  colon polyps, diverticulosis    COLONOSCOPY W/ POLYPECTOMY N/A 10/28/2020    Procedure: Colonoscopy with polypectomy x 10;  Surgeon:  Sudarshan Mitchell MD;  Location: [facility] ENDOSCOPY;  Service: Gastroenterology;  Laterality: N/A;  colon polyps x 10,  hemorrhoids    COSMETIC SURGERY  2022    Ptosis    DILATATION AND CURETTAGE  2009    and 2016    HERNIA REPAIR      LASER ABLATION CONDYLOMA CERVICAL / VULVAR      MOLE REMOVAL  2013    SKIN BIOPSY      SPLENECTOMY  2014    SPLENECTOMY      VARICOSE VEIN SURGERY         Patient Active Problem List   Diagnosis    Anxiety about health    Abdominal symptoms    Carcinoma in situ of vulva    Dyspareunia, female    Chronic nonintractable headache    Hypertrophic scar of skin    Irritable bowel syndrome with both constipation and diarrhea    Vaginal dryness, menopausal    Weight decreased    Varicose veins of lower extremity    OAB (overactive bladder)    Leukocytosis    History of splenectomy    Herpesvirus infection    Essential hypertension    Abdominal aortic aneurysm (AAA)    Sphincter of Oddi dysfunction    Osteoporosis    Goiter    GERD (gastroesophageal reflux disease)    Genital herpes    Dry eyes    Diverticulosis    Peripheral arterial disease    Atherosclerosis    Upper back pain on right side    Medicare annual wellness visit, subsequent    Vitamin D deficiency    Visit for screening mammogram    Postmenopausal    Hair loss    Anxiety    Tobacco dependence    Dyslipidemia    Bilateral carotid artery disease    Need for vaccination    Folliculitis    Vulvar carcinoma    Liver cyst    Skin lesion    Weight loss    Prediabetes    Acute pain of left shoulder    Stress    New daily persistent headache    Nail abnormality    Neck pain    Pain of both elbows    Need for hepatitis C screening test    Infected inclusion cyst    Right foot pain    Thyroid nodule    Right axillary swelling    Rash and nonspecific skin eruption       Review of Systems   Constitutional:  Negative for activity change, fatigue and fever.   Respiratory:  Negative for shortness of breath and wheezing.    Cardiovascular:   "Negative for chest pain, palpitations and leg swelling.   Musculoskeletal:  Negative for arthralgias and back pain.   Skin:  Negative for rash.   Neurological:  Negative for tremors and headache.   Psychiatric/Behavioral:  Positive for depressed mood and stress. The patient is nervous/anxious.        Objective     Vitals:    10/19/23 1437   BP: 118/65   BP Location: Right arm   Patient Position: Sitting   Cuff Size: Adult   Pulse: 83   Resp: 16   Temp: 97.5 °F (36.4 °C)   TempSrc: Infrared   SpO2: 97%   Weight: 48.5 kg (107 lb)   Height: 162.6 cm (64.02\")   PainSc: 0-No pain         No results found.    The patient has no evidence of cognitve impairment.     Physical Exam  Vitals and nursing note reviewed.   Constitutional:       General: She is not in acute distress.     Appearance: Normal appearance. She is well-developed. She is not ill-appearing.   HENT:      Head: Normocephalic and atraumatic.   Cardiovascular:      Rate and Rhythm: Normal rate and regular rhythm.      Heart sounds: Normal heart sounds. No murmur heard.     No gallop.   Pulmonary:      Effort: Pulmonary effort is normal. No respiratory distress.      Breath sounds: Normal breath sounds. No wheezing, rhonchi or rales.   Chest:      Chest wall: No tenderness.   Musculoskeletal:      Cervical back: Normal range of motion and neck supple.   Neurological:      General: No focal deficit present.      Mental Status: She is alert and oriented to person, place, and time. Mental status is at baseline.   Psychiatric:         Mood and Affect: Mood normal.         Recent Lab Results:  Lab Results   Component Value Date     (H) 05/21/2020     Lab Results   Component Value Date    CHOL 149 10/12/2023    TRIG 89 10/12/2023    HDL 60 10/12/2023    VLDL 17 10/12/2023    LDLHDL 1.19 10/12/2023       Assessment & Plan   Age-appropriate Screening Schedule:  Refer to the list below for future screening recommendations based on patient's age, sex and/or " medical conditions.      Health Maintenance   Topic Date Due    COVID-19 Vaccine (8 - 2023-24 season) 09/01/2023    BMI FOLLOWUP  08/24/2024    LIPID PANEL  10/12/2024    ANNUAL WELLNESS VISIT  10/19/2024    MAMMOGRAM  02/03/2025    DXA SCAN  07/20/2025    TDAP/TD VACCINES (2 - Tdap) 04/14/2031    COLORECTAL CANCER SCREENING  10/25/2031    HEPATITIS C SCREENING  Completed    INFLUENZA VACCINE  Completed    Pneumococcal Vaccine 65+  Completed    ZOSTER VACCINE  Completed       Medicare Risks and Personalized Health Plan:  Advance Directive Discussion  Breast Cancer/Mammogram Screening  Cardiovascular risk  Colon Cancer Screening  Dementia/Memory   Depression/Dysphoria  Diabetic Lab Screening   Fall Risk  Immunizations Discussed/Encouraged (specific immunizations; Influenza and COVID19 )  Obesity/Overweight   Osteoporosis Risk      CMS-Preventive Services Quick Reference  Medicare Preventive Services Addressed:  Annual Wellness Visit (AWV)  Bone Density Measurements  Cardiovascular Disease Screening Tests (may do this order every 5 years in beneficiaries without signs or symptoms of cardiovascular disease)  Colorectal Cancer Screening, Colonoscopy  Screening Mammography     Advance Care Planning:  ACP discussion was held with the patient during this visit. Patient has an advance directive in EMR which is still valid.     Diagnoses and all orders for this visit:    1. Medicare annual wellness visit, subsequent (Primary)    2. Need for vaccination  -     Fluzone High-Dose 65+yrs (8170-5004)      Medicare wellness exam was done today.  I reviewed and discussed with the patient her recent fasting blood work results.  I also reviewed her health maintenance.  Her last mammogram was in 2/2023.  Her last DEXA scan was in 7/2023.  Her last colonoscopy was in 10/2021 and 3-year follow-up colonoscopy was recommended.  She is vaccinated and boosted against COVID-19 and the new booster shot was recommended.  She is up to speed  with her pneumonia shot and she completed Shingrix vaccination series.  She was given flu shot today.  Healthy lifestyle was reinforced.  Smoking cessation was encouraged.    An After Visit Summary and PPPS with all of these plans were given to the patient.      Follow Up:  Return in about 1 year (around 10/19/2024) for Medicare Wellness.        Requested Prescriptions      No prescriptions requested or ordered in this encounter

## 2023-10-31 DIAGNOSIS — Z12.31 VISIT FOR SCREENING MAMMOGRAM: Primary | ICD-10-CM

## 2023-11-08 ENCOUNTER — OFFICE VISIT (OUTPATIENT)
Dept: FAMILY MEDICINE CLINIC | Facility: CLINIC | Age: 68
End: 2023-11-08
Payer: MEDICARE

## 2023-11-08 VITALS
HEART RATE: 76 BPM | TEMPERATURE: 97.5 F | RESPIRATION RATE: 16 BRPM | OXYGEN SATURATION: 97 % | WEIGHT: 106.4 LBS | DIASTOLIC BLOOD PRESSURE: 74 MMHG | BODY MASS INDEX: 18.16 KG/M2 | SYSTOLIC BLOOD PRESSURE: 119 MMHG | HEIGHT: 64 IN

## 2023-11-08 DIAGNOSIS — R59.0 CERVICAL LYMPHADENOPATHY: Primary | ICD-10-CM

## 2023-11-08 DIAGNOSIS — K58.0 IRRITABLE BOWEL SYNDROME WITH DIARRHEA: ICD-10-CM

## 2023-11-08 PROCEDURE — 99213 OFFICE O/P EST LOW 20 MIN: CPT | Performed by: FAMILY MEDICINE

## 2023-11-08 PROCEDURE — 3078F DIAST BP <80 MM HG: CPT | Performed by: FAMILY MEDICINE

## 2023-11-08 PROCEDURE — 3074F SYST BP LT 130 MM HG: CPT | Performed by: FAMILY MEDICINE

## 2023-11-08 RX ORDER — DICYCLOMINE HCL 20 MG
20 TABLET ORAL 2 TIMES DAILY PRN
Qty: 30 TABLET | Refills: 0 | Status: SHIPPED | OUTPATIENT
Start: 2023-11-08

## 2023-11-08 NOTE — PROGRESS NOTES
Subjective   Chief Complaint   Patient presents with    ENT follow-up     Lymph node on top of the left shoulder area    Irritable Bowel Syndrome    Med Refill     Shauna Marks is a 68 y.o. female.     Patient Care Team:  Talia Grey MD as PCP - General (Family Medicine)  Asa Greenwood MD as Consulting Physician (Vascular Surgery)  Yuliet Segundo MD as Consulting Physician (Hematology and Oncology)  Tasneem Macias OD (Optometry)    History of Present Illness  She is coming in today she wants to follow-up on her recent ENT evaluation.  She has been under the care of ENT lately due to a small lump located in between the left side of the neck and the left shoulder.  She previously had a skin infection consistent with folliculitis, she finished a course of doxycycline.  The small lump in the subcutaneous area is persistent, it does not cause any discomfort.  The ENT recently advised for the lymph node to be removed and the procedure is scheduled for January.  However she is not really sure if this is the best course of action.  She previously had some reactions to anesthesia and does not want to have unnecessary procedure.  Patient previously was seen by oncologist about 2 years ago for evaluation of leukocytosis and work-up was negative.  She was diagnosed with valvular carcinoma in situ several years ago.  She is a smoker.  She also wants to follow-up on her IBS.  She takes Bentyl as needed, but fairly sporadically.  When she took it in the recent it caused some blurry vision which did not last for too long.  She believes that the pills were outdated and she wonders if this possibly could have had implications and cause some side effects as in the past it never caused any issues.  She needs refill       The following portions of the patient's history were reviewed and updated as appropriate: allergies, current medications, past family history, past medical history, past social history, past  surgical history, and problem list.  Past Medical History:   Diagnosis Date    Allergic     Anxiety     Blepharitis, right eye     Colon polyp     Diverticulosis     Dry eyes     Genital herpes     GERD (gastroesophageal reflux disease)     Goiter     Headache     History of transfusion     Hyperlipidemia     Hypertension     IBS (irritable bowel syndrome)     Leg cramps     Osteopenia 2015    Osteoporosis     Pneumonia 2019    Radiculopathy of cervical spine     Skin disorder     Visual impairment     Vulva cancer     Dr. Patel     Past Surgical History:   Procedure Laterality Date    BREAST SURGERY      biopsies    CARDIAC CATHETERIZATION       SECTION      COLONOSCOPY      COLONOSCOPY N/A 10/25/2021    Procedure: COLONOSCOPY WITH POLYPECTOMY X11;  Surgeon: Sudarshan Mitchell MD;  Location: [facility] ENDOSCOPY;  Service: Gastroenterology;  Laterality: N/A;  colon polyps, diverticulosis    COLONOSCOPY W/ POLYPECTOMY N/A 10/28/2020    Procedure: Colonoscopy with polypectomy x 10;  Surgeon: Sudarshan Mitchell MD;  Location: [facility] ENDOSCOPY;  Service: Gastroenterology;  Laterality: N/A;  colon polyps x 10,  hemorrhoids    COSMETIC SURGERY      Ptosis    DILATATION AND CURETTAGE      and     HERNIA REPAIR      LASER ABLATION CONDYLOMA CERVICAL / VULVAR      MOLE REMOVAL  2013    SKIN BIOPSY      SPLENECTOMY  2014    SPLENECTOMY      VARICOSE VEIN SURGERY       The patient has a family history of  Family History   Problem Relation Age of Onset    Lung cancer Mother     Cancer Mother     Thyroid disease Mother     Coronary artery disease Father     Heart disease Father     Hypertension Father     Early death Father     High cholesterol Father     Diabetes Paternal Grandmother      Social History     Socioeconomic History    Marital status: Significant Other    Number of children: 2    Highest education level: Some college, no degree   Tobacco Use    Smoking status: Light Smoker      "Packs/day: 0.50     Years: 0.00     Additional pack years: 0.00     Total pack years: 0.00     Types: Cigarettes    Smokeless tobacco: Never   Vaping Use    Vaping Use: Never used   Substance and Sexual Activity    Alcohol use: Yes     Alcohol/week: 5.0 standard drinks of alcohol     Types: 5 Glasses of wine per week     Comment: social    Drug use: No    Sexual activity: Not Currently     Partners: Male       Review of Systems   Constitutional:  Negative for chills, fatigue and fever.   Hematological:  Bruises/bleeds easily.     Visit Vitals  /74 (BP Location: Left arm, Patient Position: Sitting, Cuff Size: Adult)   Pulse 76   Temp 97.5 °F (36.4 °C) (Infrared)   Resp 16   Ht 162.6 cm (64.02\")   Wt 48.3 kg (106 lb 6.4 oz)   LMP  (LMP Unknown)   SpO2 97%   BMI 18.25 kg/m²              Current Outpatient Medications:     dicyclomine (BENTYL) 20 MG tablet, Take 1 tablet by mouth 2 (Two) Times a Day As Needed (IBS)., Disp: 30 tablet, Rfl: 0    acyclovir (ZOVIRAX) 400 MG tablet, TAKE ONE TABLET BY MOUTH DAILY, Disp: 90 tablet, Rfl: 1    acyclovir (Zovirax) 5 % ointment, Apply 1 application topically to the appropriate area as directed Every 4 (Four) Hours. (Patient taking differently: Apply 1 application  topically to the appropriate area as directed As Needed.), Disp: 15 g, Rfl: 0    betamethasone valerate (VALISONE) 0.1 % ointment, As Needed., Disp: , Rfl:     clobetasol (TEMOVATE) 0.05 % ointment, Apply 1 application  topically to the appropriate area as directed As Needed., Disp: , Rfl:     Collagen-Vitamin C-Biotin (Collagen 1500/C) 500-50-0.8 MG capsule, Take  by mouth., Disp: , Rfl:     cycloSPORINE (RESTASIS) 0.05 % ophthalmic emulsion, Administer 1 drop to both eyes 3 (Three) Times a Day., Disp: , Rfl:     Digestive Enzymes capsule, Take 1 dose by mouth 3 (Three) Times a Day., Disp: , Rfl:     enalapril (VASOTEC) 5 MG tablet, Take 1 tablet by mouth 2 (Two) Times a Day., Disp: 180 tablet, Rfl: 1    " fluticasone (FLONASE) 50 MCG/ACT nasal spray, SPRAY TWO SPRAYS IN EACH NOSTRIL ONCE DAILY, Disp: 16 mL, Rfl: 2    hydrocortisone 2.5 % cream, INSERT RECTALLY TWO TIMES A DAY AS DIRECTED BY PRESCRIBER, Disp: 30 g, Rfl: 0    LORazepam (ATIVAN) 1 MG tablet, TAKE 1 TABLET BY MOUTH DAILY AS NEEDED FOR ANXIETY, Disp: 30 tablet, Rfl: 1    Magnesium 200 MG tablet, Take 100 mg by mouth Daily., Disp: , Rfl:     Multiple Vitamins-Minerals (multivitamin with minerals) tablet tablet, Take 1 tablet by mouth Daily., Disp: , Rfl:     polyethylene glycol (MIRALAX) powder powder, Take 17 g by mouth As Needed., Disp: , Rfl:     Probiotic Product (PROBIOTIC-10 PO), Take 1 dose by mouth Daily., Disp: , Rfl:     rosuvastatin (CRESTOR) 5 MG tablet, TAKE ONE TABLET BY MOUTH EVERY NIGHT AT BEDTIME, Disp: 90 tablet, Rfl: 2    triamcinolone (KENALOG) 0.1 % cream, , Disp: , Rfl:     vitamin C (ASCORBIC ACID) 500 MG tablet, Take 2 tablets by mouth Daily., Disp: , Rfl:     Objective   Physical Exam  Constitutional:       General: She is not in acute distress.     Appearance: Normal appearance. She is well-developed. She is not ill-appearing or diaphoretic.      Comments: Patient is in no distress, patient has normal voice and speech.  Normal respiratory effort.   HENT:      Head: Normocephalic and atraumatic.   Neck:      Comments: There is a very small subcutaneous lump noted in between the left side of the neck and the shoulder area, it seems to be few millimeter in diameter, no palpation tenderness.  No skin associated changes.  Pulmonary:      Effort: Pulmonary effort is normal.   Musculoskeletal:      Cervical back: Normal range of motion and neck supple.   Neurological:      General: No focal deficit present.      Mental Status: She is alert and oriented to person, place, and time. Mental status is at baseline.   Psychiatric:         Mood and Affect: Mood normal.         Assessment & Plan   Diagnoses and all orders for this visit:    1.  Cervical lymphadenopathy (Primary)  -     Ambulatory Referral to Hematology / Oncology    2. Irritable bowel syndrome with diarrhea  -     dicyclomine (BENTYL) 20 MG tablet; Take 1 tablet by mouth 2 (Two) Times a Day As Needed (IBS).  Dispense: 30 tablet; Refill: 0      I will be referring patient to talk to the oncologist who she previously saw for unrelated issue.  She wants to get a second opinion in regards of a small lymph node in the cervical/upper back area.  I also refilled her dicyclomine to take as needed for her IBS symptoms.        Return if symptoms worsen or fail to improve, for Recheck.    Requested Prescriptions     Signed Prescriptions Disp Refills    dicyclomine (BENTYL) 20 MG tablet 30 tablet 0     Sig: Take 1 tablet by mouth 2 (Two) Times a Day As Needed (IBS).

## 2023-11-16 DIAGNOSIS — F41.9 ANXIETY: ICD-10-CM

## 2023-11-16 RX ORDER — LORAZEPAM 1 MG/1
TABLET ORAL
Qty: 30 TABLET | Refills: 1 | Status: SHIPPED | OUTPATIENT
Start: 2023-11-16

## 2023-11-27 NOTE — PROGRESS NOTES
HEMATOLOGY ONCOLOGY OUTPATIENT CONSULTATION       Patient name: Shauna Marks  : 1955  MRN: [id number]  Primary Care Physician: Talia Grey MD  Referring Physician: Talia Grey MD  Reason For Consult: Lymphadenopathy      History of Present Illness:  Patient is a 68 y.o. female with history of reflux disease, goiter, colonic polyps, benign breast biopsies who presented with enlarged lymph node that appeared in the first week of September.    Patient had folliculitis during that time as well this was on her neck.  She started to feel this swollen lymph node.  She was sent to ENT.  She states that she had an ultrasound in their office and was told that this is enlarged lymph node.  She also states that the lymph node was painful in the beginning.  She was offered lymph node excision at that time.    Now she states that her lymph node is no longer painful has improved some.  Denies any constitutional symptoms of weight loss, night sweats.  She does have some mild fatigue      Subjective:  Patient presents for initial consultation       Past Medical History:   Diagnosis Date    Allergic     Anxiety     Blepharitis, right eye     Colon polyp     Diverticulosis     Dry eyes     Genital herpes     GERD (gastroesophageal reflux disease)     Goiter     Headache     History of transfusion     Hyperlipidemia     Hypertension     IBS (irritable bowel syndrome)     Leg cramps     Osteopenia 2015    Osteoporosis     Pneumonia 2019    Radiculopathy of cervical spine     Skin disorder     Visual impairment     Vulva cancer     Dr. Patel       Past Surgical History:   Procedure Laterality Date    BREAST SURGERY      biopsies    CARDIAC CATHETERIZATION       SECTION      COLONOSCOPY  2014    COLONOSCOPY N/A 10/25/2021    Procedure: COLONOSCOPY WITH POLYPECTOMY X11;  Surgeon: Sudarshan Mitchell MD;  Location: [facility] ENDOSCOPY;  Service: Gastroenterology;  Laterality:  N/A;  colon polyps, diverticulosis    COLONOSCOPY W/ POLYPECTOMY N/A 10/28/2020    Procedure: Colonoscopy with polypectomy x 10;  Surgeon: Sudarshan Mitchell MD;  Location: [facility] ENDOSCOPY;  Service: Gastroenterology;  Laterality: N/A;  colon polyps x 10,  hemorrhoids    COSMETIC SURGERY  2022    Ptosis    DILATATION AND CURETTAGE  2009    and 2016    HERNIA REPAIR      LASER ABLATION CONDYLOMA CERVICAL / VULVAR      MOLE REMOVAL  2013    SKIN BIOPSY      SPLENECTOMY  2014    SPLENECTOMY      VARICOSE VEIN SURGERY           Current Outpatient Medications:     acyclovir (ZOVIRAX) 400 MG tablet, TAKE ONE TABLET BY MOUTH DAILY, Disp: 90 tablet, Rfl: 1    acyclovir (Zovirax) 5 % ointment, Apply 1 application topically to the appropriate area as directed Every 4 (Four) Hours. (Patient taking differently: Apply 1 application  topically to the appropriate area as directed As Needed.), Disp: 15 g, Rfl: 0    betamethasone valerate (VALISONE) 0.1 % ointment, As Needed., Disp: , Rfl:     clobetasol (TEMOVATE) 0.05 % ointment, Apply 1 application  topically to the appropriate area as directed As Needed., Disp: , Rfl:     Collagen-Vitamin C-Biotin (Collagen 1500/C) 500-50-0.8 MG capsule, Take  by mouth., Disp: , Rfl:     cycloSPORINE (RESTASIS) 0.05 % ophthalmic emulsion, Administer 1 drop to both eyes 3 (Three) Times a Day., Disp: , Rfl:     dicyclomine (BENTYL) 20 MG tablet, Take 1 tablet by mouth 2 (Two) Times a Day As Needed (IBS)., Disp: 30 tablet, Rfl: 0    Digestive Enzymes capsule, Take 1 dose by mouth 3 (Three) Times a Day., Disp: , Rfl:     enalapril (VASOTEC) 5 MG tablet, Take 1 tablet by mouth 2 (Two) Times a Day., Disp: 180 tablet, Rfl: 1    fluticasone (FLONASE) 50 MCG/ACT nasal spray, SPRAY TWO SPRAYS IN EACH NOSTRIL ONCE DAILY, Disp: 16 mL, Rfl: 2    hydrocortisone 2.5 % cream, INSERT RECTALLY TWO TIMES A DAY AS DIRECTED BY PRESCRIBER, Disp: 30 g, Rfl: 0    LORazepam (ATIVAN) 1 MG tablet, TAKE 1 TABLET BY  "MOUTH DAILY AS NEEDED FOR ANXIETY, Disp: 30 tablet, Rfl: 1    Magnesium 200 MG tablet, Take 100 mg by mouth Daily., Disp: , Rfl:     Multiple Vitamins-Minerals (multivitamin with minerals) tablet tablet, Take 1 tablet by mouth Daily., Disp: , Rfl:     polyethylene glycol (MIRALAX) powder powder, Take 17 g by mouth As Needed., Disp: , Rfl:     Probiotic Product (PROBIOTIC-10 PO), Take 1 dose by mouth Daily., Disp: , Rfl:     rosuvastatin (CRESTOR) 5 MG tablet, TAKE ONE TABLET BY MOUTH EVERY NIGHT AT BEDTIME, Disp: 90 tablet, Rfl: 2    triamcinolone (KENALOG) 0.1 % cream, , Disp: , Rfl:     vitamin C (ASCORBIC ACID) 500 MG tablet, Take 2 tablets by mouth Daily., Disp: , Rfl:     Nirmatrelvir & Ritonavir, 300mg/100mg, (PAXLOVID) 20 x 150 MG & 10 x 100MG tablet therapy pack tablet, Take 3 tablets by mouth 2 (Two) Times a Day for 5 days., Disp: 30 tablet, Rfl: 0    Allergies   Allergen Reactions    Amoxicillin Hives and Swelling     Hives, swelling    Amoxicillin-Pot Clavulanate Anaphylaxis    Cefoxitin Other (See Comments)     \"blood pressure bottoms out\"    Ciprofloxacin Rash    Contrast Dye (Echo Or Unknown Ct/Mr) Hives, Nausea And Vomiting and Shortness Of Breath     (echo Or Unknown Ct/mr)    Doxycycline Nausea Only    Hydromorphone Confusion and Other (See Comments)    Hydromorphone Hcl Hallucinations and Rash    Iodinated Contrast Media Rash    Propafenone Other (See Comments)     (Rythmol)   Facial numbness    Azithromycin Unknown - High Severity    Cefuroxime GI Intolerance    Cephalexin Unknown - High Severity    Dicyclomine Hcl Dizziness    Fentanyl Other (See Comments)     Facial numbness      Sulfa Antibiotics Nausea And Vomiting    Sulfacetamide Nausea And Vomiting    Cephalosporins Rash    Latex Rash       Family History   Problem Relation Age of Onset    Lung cancer Mother     Cancer Mother     Thyroid disease Mother     Coronary artery disease Father     Heart disease Father     Hypertension Father     " "Early death Father     High cholesterol Father     Diabetes Paternal Grandmother        Cancer-related family history includes Cancer in her mother; Lung cancer in her mother.      Social History     Tobacco Use    Smoking status: Light Smoker     Packs/day: 0.50     Years: 0.00     Additional pack years: 0.00     Total pack years: 0.00     Types: Cigarettes    Smokeless tobacco: Never   Vaping Use    Vaping Use: Never used   Substance Use Topics    Alcohol use: Yes     Alcohol/week: 5.0 standard drinks of alcohol     Types: 5 Glasses of wine per week     Comment: social    Drug use: No     Social History     Social History Narrative    Not on file       ROS:   Review of Systems   Constitutional:  Positive for fatigue. Negative for fever.   HENT:  Negative for congestion and nosebleeds.    Eyes:  Negative for pain.   Respiratory:  Negative for cough and shortness of breath.    Cardiovascular:  Negative for chest pain.   Gastrointestinal:  Negative for abdominal pain, blood in stool, diarrhea, nausea and vomiting.   Endocrine: Negative for cold intolerance and heat intolerance.   Genitourinary:  Negative for difficulty urinating.   Musculoskeletal:  Negative for arthralgias.   Skin:  Negative for rash.   Neurological:  Negative for dizziness and headaches.   Hematological:  Does not bruise/bleed easily.   Psychiatric/Behavioral:  Negative for behavioral problems.        Objective:    Vital Signs:  Vitals:    11/30/23 1117   BP: 124/78   Pulse: 76   Resp: 16   Temp: 98 °F (36.7 °C)   SpO2: 99%   Weight: 48.4 kg (106 lb 9.6 oz)   Height: 162.6 cm (64\")   PainSc: 0-No pain     Body mass index is 18.3 kg/m².    ECOG  (0) Fully active, able to carry on all predisease performance without restriction    Physical Exam:   Physical Exam  Constitutional:       Appearance: Normal appearance.   HENT:      Head: Normocephalic and atraumatic.   Eyes:      Pupils: Pupils are equal, round, and reactive to light.   Cardiovascular:     " " Rate and Rhythm: Normal rate and regular rhythm.      Pulses: Normal pulses.      Heart sounds: No murmur heard.  Pulmonary:      Effort: Pulmonary effort is normal.      Breath sounds: Normal breath sounds.   Abdominal:      General: There is no distension.      Palpations: Abdomen is soft. There is no mass.      Tenderness: There is no abdominal tenderness.   Musculoskeletal:         General: Normal range of motion.      Cervical back: Normal range of motion.   Skin:     General: Skin is warm.   Neurological:      General: No focal deficit present.      Mental Status: She is alert.   Psychiatric:         Mood and Affect: Mood normal.     Mildly enlarged lymph node in the left cervical area    Lab Results - Last 18 Months   Lab Units 11/30/23  1109 10/12/23  0931 09/26/22  0927   WBC 10*3/mm3 10.78 13.34* 13.59*   HEMOGLOBIN g/dL 12.5 14.8 14.8   HEMATOCRIT % 38.8 43.5 44.7   PLATELETS 10*3/mm3 246 293 269   MCV fL 99.7* 95.6 97.8*     Lab Results - Last 18 Months   Lab Units 10/12/23  0931 08/08/23  1453 09/26/22  0927   SODIUM mmol/L 137 139 137   POTASSIUM mmol/L 3.9 4.3 3.7   CHLORIDE mmol/L 99 103 96*   CO2 mmol/L 26.0 27.0 26.5   BUN mg/dL 14 16 11   CREATININE mg/dL 0.93 0.84 0.85   CALCIUM mg/dL 10.1 9.5 9.9   BILIRUBIN mg/dL 0.5 0.3 0.5   ALK PHOS U/L 74 65 78   ALT (SGPT) U/L 19 20 16   AST (SGOT) U/L 33* 26 27   GLUCOSE mg/dL 91 93 93       Lab Results   Component Value Date    GLUCOSE 91 10/12/2023    BUN 14 10/12/2023    CREATININE 0.93 10/12/2023    EGFRIFNONA 88 07/07/2021    BCR 15.1 10/12/2023    K 3.9 10/12/2023    CO2 26.0 10/12/2023    CALCIUM 10.1 10/12/2023    ALBUMIN 4.9 10/12/2023    LABIL2 1.4 05/21/2020    AST 33 (H) 10/12/2023    ALT 19 10/12/2023       No results for input(s): \"APTT\", \"INR\", \"PTT\" in the last 91429 hours.    Lab Results   Component Value Date    IRON 88 09/10/2020    TIBC 383 09/10/2020    FERRITIN 121.00 09/10/2020       No results found for: \"FOLATE\"    No results " "found for: \"OCCULTBLD\"    No results found for: \"RETICCTPCT\"  Lab Results   Component Value Date    WZVPGAGD31 1,163 (H) 10/12/2023     No results found for: \"SPEP\", \"UPEP\"  LDH   Date Value Ref Range Status   07/19/2021 159 135 - 214 U/L Final     Lab Results   Component Value Date    MELODIE Negative 09/10/2020    SEDRATE 4 03/23/2022     No results found for: \"FIBRINOGEN\", \"HAPTOGLOBIN\"  No results found for: \"PTT\", \"INR\"  No results found for: \"\"  No results found for: \"CEA\"  No components found for: \"CA-19-9\"  No results found for: \"PSA\"  Lab Results   Component Value Date    SEDRATE 4 03/23/2022          Assessment & Plan     Patient is 68-year-old female with lymphadenopathy in the left cervical area    Lymphadenopathy  This seems to be likely secondary to infection with folliculitis since this was tender now decreased in size, no pain anymore.  She does not have any constitutional symptoms, suspicion for malignancy is low  Given that this is decreasing in size I do not think we need to do an excision at this point  I will reevaluate her on follow-up and examine again.  She is advised to call us again if this continues to grow or if it does not shrink significantly within the next few weeks  We can then consider getting an ultrasound and then biopsy  CBC is unremarkable    I will see her back for follow-up and examine her        Thank you very much for providing the opportunity to participate in this patient’s care. Please do not hesitate to call if there are any other questions.  Time spent on encounter including record review, history taking, exam, discussion, counseling and documentation at: 46 minutes    "

## 2023-11-30 ENCOUNTER — OFFICE VISIT (OUTPATIENT)
Dept: ONCOLOGY | Facility: CLINIC | Age: 68
End: 2023-11-30
Payer: MEDICARE

## 2023-11-30 ENCOUNTER — APPOINTMENT (OUTPATIENT)
Dept: LAB | Facility: HOSPITAL | Age: 68
End: 2023-11-30
Payer: MEDICARE

## 2023-11-30 ENCOUNTER — TELEPHONE (OUTPATIENT)
Dept: ONCOLOGY | Facility: CLINIC | Age: 68
End: 2023-11-30

## 2023-11-30 VITALS
SYSTOLIC BLOOD PRESSURE: 124 MMHG | HEART RATE: 76 BPM | TEMPERATURE: 98 F | WEIGHT: 106.6 LBS | RESPIRATION RATE: 16 BRPM | BODY MASS INDEX: 18.2 KG/M2 | DIASTOLIC BLOOD PRESSURE: 78 MMHG | OXYGEN SATURATION: 99 % | HEIGHT: 64 IN

## 2023-11-30 DIAGNOSIS — D72.829 LEUKOCYTOSIS, UNSPECIFIED TYPE: Primary | ICD-10-CM

## 2023-11-30 DIAGNOSIS — M81.0 OSTEOPOROSIS WITHOUT CURRENT PATHOLOGICAL FRACTURE, UNSPECIFIED OSTEOPOROSIS TYPE: Primary | ICD-10-CM

## 2023-11-30 DIAGNOSIS — R59.0 CERVICAL LYMPHADENOPATHY: ICD-10-CM

## 2023-11-30 LAB
BASOPHILS # BLD AUTO: 0.09 10*3/MM3 (ref 0–0.2)
BASOPHILS NFR BLD AUTO: 0.8 % (ref 0–1.5)
DEPRECATED RDW RBC AUTO: 51.1 FL (ref 37–54)
EOSINOPHIL # BLD AUTO: 0.07 10*3/MM3 (ref 0–0.4)
EOSINOPHIL NFR BLD AUTO: 0.6 % (ref 0.3–6.2)
ERYTHROCYTE [DISTWIDTH] IN BLOOD BY AUTOMATED COUNT: 14.2 % (ref 12.3–15.4)
HCT VFR BLD AUTO: 38.8 % (ref 34–46.6)
HGB BLD-MCNC: 12.5 G/DL (ref 12–15.9)
HOLD SPECIMEN: NORMAL
HOLD SPECIMEN: NORMAL
LYMPHOCYTES # BLD AUTO: 2.82 10*3/MM3 (ref 0.7–3.1)
LYMPHOCYTES NFR BLD AUTO: 26.2 % (ref 19.6–45.3)
MCH RBC QN AUTO: 32.1 PG (ref 26.6–33)
MCHC RBC AUTO-ENTMCNC: 32.2 G/DL (ref 31.5–35.7)
MCV RBC AUTO: 99.7 FL (ref 79–97)
MONOCYTES # BLD AUTO: 1.08 10*3/MM3 (ref 0.1–0.9)
MONOCYTES NFR BLD AUTO: 10 % (ref 5–12)
NEUTROPHILS NFR BLD AUTO: 6.72 10*3/MM3 (ref 1.7–7)
NEUTROPHILS NFR BLD AUTO: 62.4 % (ref 42.7–76)
PLATELET # BLD AUTO: 246 10*3/MM3 (ref 140–450)
PMV BLD AUTO: 11.8 FL (ref 6–12)
RBC # BLD AUTO: 3.89 10*6/MM3 (ref 3.77–5.28)
WBC NRBC COR # BLD AUTO: 10.78 10*3/MM3 (ref 3.4–10.8)

## 2023-11-30 PROCEDURE — 36415 COLL VENOUS BLD VENIPUNCTURE: CPT

## 2023-11-30 PROCEDURE — 85025 COMPLETE CBC W/AUTO DIFF WBC: CPT | Performed by: INTERNAL MEDICINE

## 2023-11-30 NOTE — LETTER
2023     Talia Grey MD  [address]  [address]  [place] [address]    Patient: Shauna Marks   YOB: 1955   Date of Visit: 2023     Dear Talia Grey MD:       Thank you for referring Shauna Marks to me for evaluation. Below are the relevant portions of my assessment and plan of care.    If you have questions, please do not hesitate to call me. I look forward to following hSauna along with you.         Sincerely,        Yuliet Segundo MD        CC: Yuliet Cherry MD  23 1030  Signed                           HEMATOLOGY ONCOLOGY OUTPATIENT CONSULTATION       Patient name: Shauna Marks  : 1955  MRN: [id number]  Primary Care Physician: Talia Grey MD  Referring Physician: Talia Grey MD  Reason For Consult: Lymphadenopathy      History of Present Illness:  Patient is a 68 y.o. female with history of reflux disease, goiter, colonic polyps, benign breast biopsies who presented with enlarged lymph node that appeared in the first week of September.    Patient had folliculitis during that time as well this was on her neck.  She started to feel this swollen lymph node.  She was sent to ENT.  She states that she had an ultrasound in their office and was told that this is enlarged lymph node.  She also states that the lymph node was painful in the beginning.  She was offered lymph node excision at that time.    Now she states that her lymph node is no longer painful has improved some.  Denies any constitutional symptoms of weight loss, night sweats.  She does have some mild fatigue      Subjective:  Patient presents for initial consultation       Past Medical History:   Diagnosis Date   • Allergic    • Anxiety    • Blepharitis, right eye    • Colon polyp    • Diverticulosis    • Dry eyes    • Genital herpes    • GERD (gastroesophageal reflux disease)    • Goiter    • Headache    • History of transfusion    • Hyperlipidemia    •  Hypertension    • IBS (irritable bowel syndrome)    • Leg cramps    • Osteopenia    • Osteoporosis    • Pneumonia    • Radiculopathy of cervical spine    • Skin disorder    • Visual impairment    • Vulva cancer     Dr. Patel       Past Surgical History:   Procedure Laterality Date   • BREAST SURGERY      biopsies   • CARDIAC CATHETERIZATION     •  SECTION     • COLONOSCOPY  2014   • COLONOSCOPY N/A 10/25/2021    Procedure: COLONOSCOPY WITH POLYPECTOMY X11;  Surgeon: Sudarshan Mitchell MD;  Location: [facility] ENDOSCOPY;  Service: Gastroenterology;  Laterality: N/A;  colon polyps, diverticulosis   • COLONOSCOPY W/ POLYPECTOMY N/A 10/28/2020    Procedure: Colonoscopy with polypectomy x 10;  Surgeon: Sudarshan Mitchell MD;  Location: [facility] ENDOSCOPY;  Service: Gastroenterology;  Laterality: N/A;  colon polyps x 10,  hemorrhoids   • COSMETIC SURGERY      Ptosis   • DILATATION AND CURETTAGE      and    • HERNIA REPAIR     • LASER ABLATION CONDYLOMA CERVICAL / VULVAR     • MOLE REMOVAL     • SKIN BIOPSY     • SPLENECTOMY     • SPLENECTOMY     • VARICOSE VEIN SURGERY           Current Outpatient Medications:   •  acyclovir (ZOVIRAX) 400 MG tablet, TAKE ONE TABLET BY MOUTH DAILY, Disp: 90 tablet, Rfl: 1  •  acyclovir (Zovirax) 5 % ointment, Apply 1 application topically to the appropriate area as directed Every 4 (Four) Hours. (Patient taking differently: Apply 1 application  topically to the appropriate area as directed As Needed.), Disp: 15 g, Rfl: 0  •  betamethasone valerate (VALISONE) 0.1 % ointment, As Needed., Disp: , Rfl:   •  clobetasol (TEMOVATE) 0.05 % ointment, Apply 1 application  topically to the appropriate area as directed As Needed., Disp: , Rfl:   •  Collagen-Vitamin C-Biotin (Collagen 1500/C) 500-50-0.8 MG capsule, Take  by mouth., Disp: , Rfl:   •  cycloSPORINE (RESTASIS) 0.05 % ophthalmic emulsion, Administer 1 drop to both eyes 3 (Three) Times a Day.,  "Disp: , Rfl:   •  dicyclomine (BENTYL) 20 MG tablet, Take 1 tablet by mouth 2 (Two) Times a Day As Needed (IBS)., Disp: 30 tablet, Rfl: 0  •  Digestive Enzymes capsule, Take 1 dose by mouth 3 (Three) Times a Day., Disp: , Rfl:   •  enalapril (VASOTEC) 5 MG tablet, Take 1 tablet by mouth 2 (Two) Times a Day., Disp: 180 tablet, Rfl: 1  •  fluticasone (FLONASE) 50 MCG/ACT nasal spray, SPRAY TWO SPRAYS IN EACH NOSTRIL ONCE DAILY, Disp: 16 mL, Rfl: 2  •  hydrocortisone 2.5 % cream, INSERT RECTALLY TWO TIMES A DAY AS DIRECTED BY PRESCRIBER, Disp: 30 g, Rfl: 0  •  LORazepam (ATIVAN) 1 MG tablet, TAKE 1 TABLET BY MOUTH DAILY AS NEEDED FOR ANXIETY, Disp: 30 tablet, Rfl: 1  •  Magnesium 200 MG tablet, Take 100 mg by mouth Daily., Disp: , Rfl:   •  Multiple Vitamins-Minerals (multivitamin with minerals) tablet tablet, Take 1 tablet by mouth Daily., Disp: , Rfl:   •  polyethylene glycol (MIRALAX) powder powder, Take 17 g by mouth As Needed., Disp: , Rfl:   •  Probiotic Product (PROBIOTIC-10 PO), Take 1 dose by mouth Daily., Disp: , Rfl:   •  rosuvastatin (CRESTOR) 5 MG tablet, TAKE ONE TABLET BY MOUTH EVERY NIGHT AT BEDTIME, Disp: 90 tablet, Rfl: 2  •  triamcinolone (KENALOG) 0.1 % cream, , Disp: , Rfl:   •  vitamin C (ASCORBIC ACID) 500 MG tablet, Take 2 tablets by mouth Daily., Disp: , Rfl:   •  Nirmatrelvir & Ritonavir, 300mg/100mg, (PAXLOVID) 20 x 150 MG & 10 x 100MG tablet therapy pack tablet, Take 3 tablets by mouth 2 (Two) Times a Day for 5 days., Disp: 30 tablet, Rfl: 0    Allergies   Allergen Reactions   • Amoxicillin Hives and Swelling     Hives, swelling   • Amoxicillin-Pot Clavulanate Anaphylaxis   • Cefoxitin Other (See Comments)     \"blood pressure bottoms out\"   • Ciprofloxacin Rash   • Contrast Dye (Echo Or Unknown Ct/Mr) Hives, Nausea And Vomiting and Shortness Of Breath     (echo Or Unknown Ct/mr)   • Doxycycline Nausea Only   • Hydromorphone Confusion and Other (See Comments)   • Hydromorphone Hcl " Hallucinations and Rash   • Iodinated Contrast Media Rash   • Propafenone Other (See Comments)     (Rythmol)   Facial numbness   • Azithromycin Unknown - High Severity   • Cefuroxime GI Intolerance   • Cephalexin Unknown - High Severity   • Dicyclomine Hcl Dizziness   • Fentanyl Other (See Comments)     Facial numbness     • Sulfa Antibiotics Nausea And Vomiting   • Sulfacetamide Nausea And Vomiting   • Cephalosporins Rash   • Latex Rash       Family History   Problem Relation Age of Onset   • Lung cancer Mother    • Cancer Mother    • Thyroid disease Mother    • Coronary artery disease Father    • Heart disease Father    • Hypertension Father    • Early death Father    • High cholesterol Father    • Diabetes Paternal Grandmother        Cancer-related family history includes Cancer in her mother; Lung cancer in her mother.      Social History     Tobacco Use   • Smoking status: Light Smoker     Packs/day: 0.50     Years: 0.00     Additional pack years: 0.00     Total pack years: 0.00     Types: Cigarettes   • Smokeless tobacco: Never   Vaping Use   • Vaping Use: Never used   Substance Use Topics   • Alcohol use: Yes     Alcohol/week: 5.0 standard drinks of alcohol     Types: 5 Glasses of wine per week     Comment: social   • Drug use: No     Social History     Social History Narrative   • Not on file       ROS:   Review of Systems   Constitutional:  Positive for fatigue. Negative for fever.   HENT:  Negative for congestion and nosebleeds.    Eyes:  Negative for pain.   Respiratory:  Negative for cough and shortness of breath.    Cardiovascular:  Negative for chest pain.   Gastrointestinal:  Negative for abdominal pain, blood in stool, diarrhea, nausea and vomiting.   Endocrine: Negative for cold intolerance and heat intolerance.   Genitourinary:  Negative for difficulty urinating.   Musculoskeletal:  Negative for arthralgias.   Skin:  Negative for rash.   Neurological:  Negative for dizziness and headaches.  "  Hematological:  Does not bruise/bleed easily.   Psychiatric/Behavioral:  Negative for behavioral problems.        Objective:    Vital Signs:  Vitals:    11/30/23 1117   BP: 124/78   Pulse: 76   Resp: 16   Temp: 98 °F (36.7 °C)   SpO2: 99%   Weight: 48.4 kg (106 lb 9.6 oz)   Height: 162.6 cm (64\")   PainSc: 0-No pain     Body mass index is 18.3 kg/m².    ECOG  (0) Fully active, able to carry on all predisease performance without restriction    Physical Exam:   Physical Exam  Constitutional:       Appearance: Normal appearance.   HENT:      Head: Normocephalic and atraumatic.   Eyes:      Pupils: Pupils are equal, round, and reactive to light.   Cardiovascular:      Rate and Rhythm: Normal rate and regular rhythm.      Pulses: Normal pulses.      Heart sounds: No murmur heard.  Pulmonary:      Effort: Pulmonary effort is normal.      Breath sounds: Normal breath sounds.   Abdominal:      General: There is no distension.      Palpations: Abdomen is soft. There is no mass.      Tenderness: There is no abdominal tenderness.   Musculoskeletal:         General: Normal range of motion.      Cervical back: Normal range of motion.   Skin:     General: Skin is warm.   Neurological:      General: No focal deficit present.      Mental Status: She is alert.   Psychiatric:         Mood and Affect: Mood normal.     Mildly enlarged lymph node in the left cervical area    Lab Results - Last 18 Months   Lab Units 11/30/23  1109 10/12/23  0931 09/26/22  0927   WBC 10*3/mm3 10.78 13.34* 13.59*   HEMOGLOBIN g/dL 12.5 14.8 14.8   HEMATOCRIT % 38.8 43.5 44.7   PLATELETS 10*3/mm3 246 293 269   MCV fL 99.7* 95.6 97.8*     Lab Results - Last 18 Months   Lab Units 10/12/23  0931 08/08/23  1453 09/26/22  0927   SODIUM mmol/L 137 139 137   POTASSIUM mmol/L 3.9 4.3 3.7   CHLORIDE mmol/L 99 103 96*   CO2 mmol/L 26.0 27.0 26.5   BUN mg/dL 14 16 11   CREATININE mg/dL 0.93 0.84 0.85   CALCIUM mg/dL 10.1 9.5 9.9   BILIRUBIN mg/dL 0.5 0.3 0.5   ALK " "PHOS U/L 74 65 78   ALT (SGPT) U/L 19 20 16   AST (SGOT) U/L 33* 26 27   GLUCOSE mg/dL 91 93 93       Lab Results   Component Value Date    GLUCOSE 91 10/12/2023    BUN 14 10/12/2023    CREATININE 0.93 10/12/2023    EGFRIFNONA 88 07/07/2021    BCR 15.1 10/12/2023    K 3.9 10/12/2023    CO2 26.0 10/12/2023    CALCIUM 10.1 10/12/2023    ALBUMIN 4.9 10/12/2023    LABIL2 1.4 05/21/2020    AST 33 (H) 10/12/2023    ALT 19 10/12/2023       No results for input(s): \"APTT\", \"INR\", \"PTT\" in the last 98966 hours.    Lab Results   Component Value Date    IRON 88 09/10/2020    TIBC 383 09/10/2020    FERRITIN 121.00 09/10/2020       No results found for: \"FOLATE\"    No results found for: \"OCCULTBLD\"    No results found for: \"RETICCTPCT\"  Lab Results   Component Value Date    LMLYRCBW52 1,163 (H) 10/12/2023     No results found for: \"SPEP\", \"UPEP\"  LDH   Date Value Ref Range Status   07/19/2021 159 135 - 214 U/L Final     Lab Results   Component Value Date    MELODIE Negative 09/10/2020    SEDRATE 4 03/23/2022     No results found for: \"FIBRINOGEN\", \"HAPTOGLOBIN\"  No results found for: \"PTT\", \"INR\"  No results found for: \"\"  No results found for: \"CEA\"  No components found for: \"CA-19-9\"  No results found for: \"PSA\"  Lab Results   Component Value Date    SEDRATE 4 03/23/2022          Assessment & Plan    Patient is 68-year-old female with lymphadenopathy in the left cervical area    Lymphadenopathy  This seems to be likely secondary to infection with folliculitis since this was tender now decreased in size, no pain anymore.  She does not have any constitutional symptoms, suspicion for malignancy is low  Given that this is decreasing in size I do not think we need to do an excision at this point  I will reevaluate her on follow-up and examine again.  She is advised to call us again if this continues to grow or if it does not shrink significantly within the next few weeks  We can then consider getting an ultrasound and then " biopsy  CBC is unremarkable    I will see her back for follow-up and examine her        Thank you very much for providing the opportunity to participate in this patient’s care. Please do not hesitate to call if there are any other questions.  Time spent on encounter including record review, history taking, exam, discussion, counseling and documentation at: 46 minutes

## 2023-11-30 NOTE — TELEPHONE ENCOUNTER
Caller: Shauna Marks    Relationship to patient: Self    Best call back number: [phone]    Chief complaint:     Type of visit: LAB & F/U 1     Requested date: 1/29/2024 AROUND 11, CALL TO R/S     If rescheduling, when is the original appointment: 1/30/2024    Additional notes:

## 2023-12-07 ENCOUNTER — TELEMEDICINE (OUTPATIENT)
Dept: FAMILY MEDICINE CLINIC | Facility: CLINIC | Age: 68
End: 2023-12-07
Payer: MEDICARE

## 2023-12-07 DIAGNOSIS — U07.1 UPPER RESPIRATORY TRACT INFECTION DUE TO COVID-19 VIRUS: Primary | ICD-10-CM

## 2023-12-07 DIAGNOSIS — J06.9 UPPER RESPIRATORY TRACT INFECTION DUE TO COVID-19 VIRUS: Primary | ICD-10-CM

## 2023-12-07 PROCEDURE — 99213 OFFICE O/P EST LOW 20 MIN: CPT | Performed by: FAMILY MEDICINE

## 2023-12-07 NOTE — PROGRESS NOTES
Subjective   Chief Complaint   Patient presents with    URI    Cough     Positive COVID-19 test     Shanua Marks is a 68 y.o. female.     Patient Care Team:  Talia Grey MD as PCP - General (Family Medicine)  Asa Greenwood MD as Consulting Physician (Vascular Surgery)  Yuliet Segundo MD as Consulting Physician (Hematology and Oncology)  Tasneem Macias OD (Optometry)  You have chosen to receive care through a telehealth visit.  Do you consent to use a video/audio connection for your medical care today? Yes      History of Present Illness  Patient is being evaluated today through telehealth medicine appointment via the video in view of COVID-19 pandemic.  Connection is being achieved through [employer].  We can see and hear each other well.  Patient is at home and I am in my office during this evaluation.  She reports that for the last 5 or 6 days she has been experiencing some upper respiratory symptoms which come and go, they have been present more consistently over the last couple of days.  She has had some goopy eyes, headache, some cough, she feels somehow rundown, but no fever or chills reported.  She took 2 separate home COVID-19 tests today and they showed faintly positive.  She is vaccinated against COVID-19, but she did not get the most recent COVID-19 shot.  She would like to discuss possible treatment options       The following portions of the patient's history were reviewed and updated as appropriate: allergies, current medications, past family history, past medical history, past social history, past surgical history, and problem list.  Past Medical History:   Diagnosis Date    Allergic     Anxiety     Blepharitis, right eye     Colon polyp     Diverticulosis     Dry eyes     Genital herpes     GERD (gastroesophageal reflux disease)     Goiter     Headache     History of transfusion     Hyperlipidemia     Hypertension     IBS (irritable bowel syndrome)     Leg cramps     Osteopenia 2015     Osteoporosis     Pneumonia     Radiculopathy of cervical spine     Skin disorder     Visual impairment     Vulva cancer     Dr. Patel     Past Surgical History:   Procedure Laterality Date    BREAST SURGERY      biopsies    CARDIAC CATHETERIZATION       SECTION      COLONOSCOPY      COLONOSCOPY N/A 10/25/2021    Procedure: COLONOSCOPY WITH POLYPECTOMY X11;  Surgeon: Sudarshan Mitchell MD;  Location: [facility] ENDOSCOPY;  Service: Gastroenterology;  Laterality: N/A;  colon polyps, diverticulosis    COLONOSCOPY W/ POLYPECTOMY N/A 10/28/2020    Procedure: Colonoscopy with polypectomy x 10;  Surgeon: Sudarshan Mitchell MD;  Location: [facility] ENDOSCOPY;  Service: Gastroenterology;  Laterality: N/A;  colon polyps x 10,  hemorrhoids    COSMETIC SURGERY      Ptosis    DILATATION AND CURETTAGE      and     HERNIA REPAIR      LASER ABLATION CONDYLOMA CERVICAL / VULVAR      MOLE REMOVAL  2013    SKIN BIOPSY      SPLENECTOMY  2014    SPLENECTOMY      VARICOSE VEIN SURGERY       The patient has a family history of  Family History   Problem Relation Age of Onset    Lung cancer Mother     Cancer Mother     Thyroid disease Mother     Coronary artery disease Father     Heart disease Father     Hypertension Father     Early death Father     High cholesterol Father     Diabetes Paternal Grandmother      Social History     Socioeconomic History    Marital status: Significant Other    Number of children: 2    Highest education level: Some college, no degree   Tobacco Use    Smoking status: Light Smoker     Packs/day: 0.50     Years: 0.00     Additional pack years: 0.00     Total pack years: 0.00     Types: Cigarettes    Smokeless tobacco: Never   Vaping Use    Vaping Use: Never used   Substance and Sexual Activity    Alcohol use: Yes     Alcohol/week: 5.0 standard drinks of alcohol     Types: 5 Glasses of wine per week     Comment: social    Drug use: No    Sexual activity: Not Currently      Partners: Male       Review of Systems   Constitutional:  Positive for fatigue. Negative for activity change, appetite change, chills and fever.   HENT:  Positive for congestion, postnasal drip and sneezing. Negative for ear pain, sinus pressure, sore throat and swollen glands.    Respiratory:  Positive for cough. Negative for choking, chest tightness, shortness of breath, wheezing and stridor.    Cardiovascular:  Negative for chest pain.   Skin:  Negative for dry skin and rash.   Neurological:  Positive for headache.     Visit Vitals  LMP  (LMP Unknown)              Current Outpatient Medications:     acyclovir (ZOVIRAX) 400 MG tablet, TAKE ONE TABLET BY MOUTH DAILY, Disp: 90 tablet, Rfl: 1    acyclovir (Zovirax) 5 % ointment, Apply 1 application topically to the appropriate area as directed Every 4 (Four) Hours. (Patient taking differently: Apply 1 application  topically to the appropriate area as directed As Needed.), Disp: 15 g, Rfl: 0    betamethasone valerate (VALISONE) 0.1 % ointment, As Needed., Disp: , Rfl:     clobetasol (TEMOVATE) 0.05 % ointment, Apply 1 application  topically to the appropriate area as directed As Needed., Disp: , Rfl:     Collagen-Vitamin C-Biotin (Collagen 1500/C) 500-50-0.8 MG capsule, Take  by mouth., Disp: , Rfl:     cycloSPORINE (RESTASIS) 0.05 % ophthalmic emulsion, Administer 1 drop to both eyes 3 (Three) Times a Day., Disp: , Rfl:     dicyclomine (BENTYL) 20 MG tablet, Take 1 tablet by mouth 2 (Two) Times a Day As Needed (IBS)., Disp: 30 tablet, Rfl: 0    Digestive Enzymes capsule, Take 1 dose by mouth 3 (Three) Times a Day., Disp: , Rfl:     enalapril (VASOTEC) 5 MG tablet, Take 1 tablet by mouth 2 (Two) Times a Day., Disp: 180 tablet, Rfl: 1    fluticasone (FLONASE) 50 MCG/ACT nasal spray, SPRAY TWO SPRAYS IN EACH NOSTRIL ONCE DAILY, Disp: 16 mL, Rfl: 2    hydrocortisone 2.5 % cream, INSERT RECTALLY TWO TIMES A DAY AS DIRECTED BY PRESCRIBER, Disp: 30 g, Rfl: 0    LORazepam  (ATIVAN) 1 MG tablet, TAKE 1 TABLET BY MOUTH DAILY AS NEEDED FOR ANXIETY, Disp: 30 tablet, Rfl: 1    Magnesium 200 MG tablet, Take 100 mg by mouth Daily., Disp: , Rfl:     Multiple Vitamins-Minerals (multivitamin with minerals) tablet tablet, Take 1 tablet by mouth Daily., Disp: , Rfl:     polyethylene glycol (MIRALAX) powder powder, Take 17 g by mouth As Needed., Disp: , Rfl:     Probiotic Product (PROBIOTIC-10 PO), Take 1 dose by mouth Daily., Disp: , Rfl:     rosuvastatin (CRESTOR) 5 MG tablet, TAKE ONE TABLET BY MOUTH EVERY NIGHT AT BEDTIME, Disp: 90 tablet, Rfl: 2    triamcinolone (KENALOG) 0.1 % cream, , Disp: , Rfl:     vitamin C (ASCORBIC ACID) 500 MG tablet, Take 2 tablets by mouth Daily., Disp: , Rfl:     Objective   Physical Exam  Constitutional:       General: She is not in acute distress.     Appearance: Normal appearance. She is well-developed. She is not ill-appearing or diaphoretic.      Comments: Patient is in no distress, patient has normal voice and speech.  Normal respiratory effort.   HENT:      Head: Normocephalic and atraumatic.   Pulmonary:      Effort: Pulmonary effort is normal.   Musculoskeletal:      Cervical back: Normal range of motion and neck supple.   Neurological:      General: No focal deficit present.      Mental Status: She is alert and oriented to person, place, and time. Mental status is at baseline.   Psychiatric:         Mood and Affect: Mood normal.           Assessment & Plan   Diagnoses and all orders for this visit:    1. Upper respiratory tract infection due to COVID-19 virus (Primary)        Symptomatic treatment with over-the-counter medications was discussed and recommended including Tylenol or Mucinex DM for cough.  Hydration and rest were also advised.  Patient is to monitor the symptoms.  We also discussed treatment with Paxlovid, patient however feels leery about proceeding with the medication due to concern about having side effects as previously over the years  she had side effects to multiple medications.  Patient understands that this medication should be initiated within the first 5 days of symptoms onset.  She is to contact us if any questions or concerns.      Return if symptoms worsen or fail to improve, for Recheck.    Requested Prescriptions      No prescriptions requested or ordered in this encounter

## 2023-12-14 DIAGNOSIS — I10 ESSENTIAL HYPERTENSION: ICD-10-CM

## 2023-12-14 RX ORDER — ENALAPRIL MALEATE 5 MG/1
5 TABLET ORAL 2 TIMES DAILY
Qty: 180 TABLET | Refills: 1 | Status: SHIPPED | OUTPATIENT
Start: 2023-12-14

## 2023-12-29 ENCOUNTER — OFFICE VISIT (OUTPATIENT)
Dept: FAMILY MEDICINE CLINIC | Facility: CLINIC | Age: 68
End: 2023-12-29
Payer: MEDICARE

## 2023-12-29 VITALS
HEART RATE: 78 BPM | WEIGHT: 107 LBS | TEMPERATURE: 98.6 F | DIASTOLIC BLOOD PRESSURE: 74 MMHG | BODY MASS INDEX: 18.27 KG/M2 | RESPIRATION RATE: 16 BRPM | OXYGEN SATURATION: 98 % | HEIGHT: 64 IN | SYSTOLIC BLOOD PRESSURE: 134 MMHG

## 2023-12-29 DIAGNOSIS — J06.9 ACUTE URI: Primary | ICD-10-CM

## 2023-12-29 PROBLEM — U07.1 UPPER RESPIRATORY TRACT INFECTION DUE TO COVID-19 VIRUS: Status: RESOLVED | Noted: 2023-12-07 | Resolved: 2023-12-29

## 2023-12-29 LAB
EXPIRATION DATE: NORMAL
EXPIRATION DATE: NORMAL
FLUAV AG UPPER RESP QL IA.RAPID: NOT DETECTED
FLUBV AG UPPER RESP QL IA.RAPID: NOT DETECTED
INTERNAL CONTROL: NORMAL
INTERNAL CONTROL: NORMAL
Lab: NORMAL
Lab: NORMAL
RSV AG SPEC QL: NEGATIVE
SARS-COV-2 AG UPPER RESP QL IA.RAPID: NOT DETECTED

## 2023-12-29 PROCEDURE — 99213 OFFICE O/P EST LOW 20 MIN: CPT | Performed by: FAMILY MEDICINE

## 2023-12-29 PROCEDURE — 87428 SARSCOV & INF VIR A&B AG IA: CPT | Performed by: FAMILY MEDICINE

## 2023-12-29 NOTE — PROGRESS NOTES
Subjective   Chief Complaint   Patient presents with    Fever    Cough     hoarse    Headache    Nasal Congestion     Shauna Marks is a 68 y.o. female.     Patient Care Team:  Talia Grey MD as PCP - General (Family Medicine)  Asa Greenwood MD as Consulting Physician (Vascular Surgery)  Yuliet Segundo MD as Consulting Physician (Hematology and Oncology)  Tasneem Macias OD (Optometry)    History of Present Illness  She is coming in today due to upper respiratory symptoms.  She was diagnosed with COVID-19 about 3 weeks ago.  At that time she was prescribed Paxlovid, but she never took it as by the time she filled it in the pharmacy she was out of the window for treatment.  She has been having some lingering symptoms since then including some cough, congestion, and overall rundown feeling.  Today in the morning she even noted pretty raspy voice and she had hard time to even talk, but now at the time of this evaluation this is better.  No chest pains or difficulty breathing.       The following portions of the patient's history were reviewed and updated as appropriate: allergies, current medications, past family history, past medical history, past social history, past surgical history, and problem list.  Past Medical History:   Diagnosis Date    Allergic     Anxiety     Blepharitis, right eye     Colon polyp     Diverticulosis     Dry eyes     Genital herpes     GERD (gastroesophageal reflux disease)     Goiter     Headache     History of transfusion     Hyperlipidemia     Hypertension     IBS (irritable bowel syndrome)     Leg cramps     Osteopenia 2015    Osteoporosis     Pneumonia 2019    Radiculopathy of cervical spine     Skin disorder     Visual impairment     Vulva cancer     Dr. Patel     Past Surgical History:   Procedure Laterality Date    BREAST SURGERY      biopsies    CARDIAC CATHETERIZATION       SECTION      COLONOSCOPY  2014    COLONOSCOPY N/A 10/25/2021    Procedure:  COLONOSCOPY WITH POLYPECTOMY X11;  Surgeon: Sudarshan Mitchell MD;  Location: [facility] ENDOSCOPY;  Service: Gastroenterology;  Laterality: N/A;  colon polyps, diverticulosis    COLONOSCOPY W/ POLYPECTOMY N/A 10/28/2020    Procedure: Colonoscopy with polypectomy x 10;  Surgeon: Sudarshan Mitchell MD;  Location: [facility] ENDOSCOPY;  Service: Gastroenterology;  Laterality: N/A;  colon polyps x 10,  hemorrhoids    COSMETIC SURGERY  2022    Ptosis    DILATATION AND CURETTAGE  2009    and 2016    HERNIA REPAIR      LASER ABLATION CONDYLOMA CERVICAL / VULVAR      MOLE REMOVAL  2013    SKIN BIOPSY      SPLENECTOMY  2014    SPLENECTOMY      VARICOSE VEIN SURGERY       The patient has a family history of  Family History   Problem Relation Age of Onset    Lung cancer Mother     Cancer Mother     Thyroid disease Mother     Coronary artery disease Father     Heart disease Father     Hypertension Father     Early death Father     High cholesterol Father     Diabetes Paternal Grandmother      Social History     Socioeconomic History    Marital status: Significant Other    Number of children: 2    Highest education level: Some college, no degree   Tobacco Use    Smoking status: Light Smoker     Packs/day: 0.50     Years: 0.00     Additional pack years: 0.00     Total pack years: 0.00     Types: Cigarettes    Smokeless tobacco: Never   Vaping Use    Vaping Use: Never used   Substance and Sexual Activity    Alcohol use: Yes     Alcohol/week: 5.0 standard drinks of alcohol     Types: 5 Glasses of wine per week     Comment: social    Drug use: No    Sexual activity: Not Currently     Partners: Male       Review of Systems   Constitutional:  Positive for fatigue and fever. Negative for activity change, appetite change and chills.   HENT:  Positive for congestion and voice change. Negative for ear pain, postnasal drip, sinus pressure, sore throat and swollen glands.    Respiratory:  Positive for cough. Negative for choking, chest  "tightness, shortness of breath, wheezing and stridor.    Cardiovascular:  Negative for chest pain.   Skin:  Negative for dry skin and rash.   Neurological:  Positive for headache.     Visit Vitals  /74 (BP Location: Left arm, Patient Position: Sitting, Cuff Size: Adult)   Pulse 78   Temp 98.6 °F (37 °C) (Infrared) Comment: advil this am   Resp 16   Ht 162.6 cm (64.02\")   Wt 48.5 kg (107 lb)   LMP  (LMP Unknown)   SpO2 98%   BMI 18.36 kg/m²              Current Outpatient Medications:     acyclovir (ZOVIRAX) 400 MG tablet, TAKE ONE TABLET BY MOUTH DAILY, Disp: 90 tablet, Rfl: 1    acyclovir (Zovirax) 5 % ointment, Apply 1 application topically to the appropriate area as directed Every 4 (Four) Hours. (Patient taking differently: Apply 1 application  topically to the appropriate area as directed As Needed.), Disp: 15 g, Rfl: 0    betamethasone valerate (VALISONE) 0.1 % ointment, As Needed., Disp: , Rfl:     clobetasol (TEMOVATE) 0.05 % ointment, Apply 1 application  topically to the appropriate area as directed As Needed., Disp: , Rfl:     Collagen-Vitamin C-Biotin (Collagen 1500/C) 500-50-0.8 MG capsule, Take  by mouth., Disp: , Rfl:     cycloSPORINE (RESTASIS) 0.05 % ophthalmic emulsion, Administer 1 drop to both eyes 3 (Three) Times a Day., Disp: , Rfl:     dicyclomine (BENTYL) 20 MG tablet, Take 1 tablet by mouth 2 (Two) Times a Day As Needed (IBS)., Disp: 30 tablet, Rfl: 0    Digestive Enzymes capsule, Take 1 dose by mouth 3 (Three) Times a Day., Disp: , Rfl:     enalapril (VASOTEC) 5 MG tablet, TAKE 1 TABLET BY MOUTH TWICE A DAY, Disp: 180 tablet, Rfl: 1    fluticasone (FLONASE) 50 MCG/ACT nasal spray, SPRAY TWO SPRAYS IN EACH NOSTRIL ONCE DAILY, Disp: 16 mL, Rfl: 2    hydrocortisone 2.5 % cream, INSERT RECTALLY TWO TIMES A DAY AS DIRECTED BY PRESCRIBER, Disp: 30 g, Rfl: 0    LORazepam (ATIVAN) 1 MG tablet, TAKE 1 TABLET BY MOUTH DAILY AS NEEDED FOR ANXIETY, Disp: 30 tablet, Rfl: 1    Magnesium 200 MG " tablet, Take 100 mg by mouth Daily., Disp: , Rfl:     Multiple Vitamins-Minerals (multivitamin with minerals) tablet tablet, Take 1 tablet by mouth Daily., Disp: , Rfl:     polyethylene glycol (MIRALAX) powder powder, Take 17 g by mouth As Needed., Disp: , Rfl:     Probiotic Product (PROBIOTIC-10 PO), Take 1 dose by mouth Daily., Disp: , Rfl:     rosuvastatin (CRESTOR) 5 MG tablet, TAKE ONE TABLET BY MOUTH EVERY NIGHT AT BEDTIME, Disp: 90 tablet, Rfl: 2    triamcinolone (KENALOG) 0.1 % cream, , Disp: , Rfl:     vitamin C (ASCORBIC ACID) 500 MG tablet, Take 2 tablets by mouth Daily., Disp: , Rfl:     Objective   Physical Exam  Vitals and nursing note reviewed.   Constitutional:       General: She is not in acute distress.     Appearance: She is well-developed.   HENT:      Head: Normocephalic and atraumatic.      Right Ear: Tympanic membrane and external ear normal.      Left Ear: Tympanic membrane and external ear normal.      Nose: Congestion present.      Mouth/Throat:      Pharynx: No oropharyngeal exudate.   Eyes:      Conjunctiva/sclera: Conjunctivae normal.      Pupils: Pupils are equal, round, and reactive to light.   Cardiovascular:      Rate and Rhythm: Normal rate and regular rhythm.      Heart sounds: Normal heart sounds.   Pulmonary:      Effort: Pulmonary effort is normal. No respiratory distress.      Breath sounds: Normal breath sounds. No stridor. No wheezing, rhonchi or rales.   Musculoskeletal:      Cervical back: Normal range of motion and neck supple.   Skin:     General: Skin is warm and dry.      Findings: No rash.       Assessment & Plan   Diagnoses and all orders for this visit:    1. Acute URI (Primary)  -     POCT RSV  -     POCT SARS-CoV-2 + Flu Antigen JAYDEN        Her rapid COVID-19 and flu test as well as RSV test were negative today.  Patient was advised to continue over-the-counter medication for symptom control like Tylenol, ibuprofen and Mucinex.  She may also try nasal spray.  She is  to continue to monitor the symptoms and contact us back if any concerns.      Return if symptoms worsen or fail to improve, for Recheck.    Requested Prescriptions      No prescriptions requested or ordered in this encounter

## 2024-01-02 RX ORDER — DOXYCYCLINE HYCLATE 50 MG/1
50 CAPSULE ORAL 2 TIMES DAILY
Qty: 14 CAPSULE | Refills: 0 | Status: SHIPPED | OUTPATIENT
Start: 2024-01-02

## 2024-01-16 DIAGNOSIS — F41.9 ANXIETY: ICD-10-CM

## 2024-01-16 RX ORDER — LORAZEPAM 1 MG/1
TABLET ORAL
Qty: 30 TABLET | Refills: 1 | Status: SHIPPED | OUTPATIENT
Start: 2024-01-16

## 2024-01-25 NOTE — PROGRESS NOTES
HEMATOLOGY ONCOLOGY OUTPATIENT FOLLOW UP       Patient name: Shauna aMrks  : 1955  MRN: [id number]  Primary Care Physician: Talia Grey MD  Referring Physician: No ref. provider found  Reason For Consult: Lymphadenopathy      History of Present Illness:  Patient is a 68 y.o. female with history of reflux disease, goiter, colonic polyps, benign breast biopsies who presented with enlarged lymph node that appeared in the first week of September.    Patient had folliculitis during that time as well this was on her neck.  She started to feel this swollen lymph node.  She was sent to ENT.  She states that she had an ultrasound in their office and was told that this is enlarged lymph node.  She also states that the lymph node was painful in the beginning.  She was offered lymph node excision at that time.    Now she states that her lymph node is no longer painful has improved some.  Denies any constitutional symptoms of weight loss, night sweats.  She does have some mild fatigue    24 - WBC 12.45, Hb 14.1, plt 263    Subjective:  Patient presents for follow up. No more lymphadenopathy.      Past Medical History:   Diagnosis Date    Allergic     Anxiety     Blepharitis, right eye     Colon polyp     Diverticulosis     Dry eyes     Genital herpes     GERD (gastroesophageal reflux disease)     Goiter     Headache     History of transfusion     Hyperlipidemia     Hypertension     IBS (irritable bowel syndrome)     Leg cramps     Osteopenia 2015    Osteoporosis     Pneumonia 2019    Radiculopathy of cervical spine     Skin disorder     Visual impairment     Vulva cancer     Dr. Patel       Past Surgical History:   Procedure Laterality Date    BREAST SURGERY      biopsies    CARDIAC CATHETERIZATION       SECTION      COLONOSCOPY      COLONOSCOPY N/A 10/25/2021    Procedure: COLONOSCOPY WITH POLYPECTOMY X11;  Surgeon: Sudarshan Mitchell MD;  Location: [facility]  ENDOSCOPY;  Service: Gastroenterology;  Laterality: N/A;  colon polyps, diverticulosis    COLONOSCOPY W/ POLYPECTOMY N/A 10/28/2020    Procedure: Colonoscopy with polypectomy x 10;  Surgeon: Sudarshan Mitchell MD;  Location: [facility] ENDOSCOPY;  Service: Gastroenterology;  Laterality: N/A;  colon polyps x 10,  hemorrhoids    COSMETIC SURGERY  2022    Ptosis    DILATATION AND CURETTAGE  2009    and 2016    HERNIA REPAIR      LASER ABLATION CONDYLOMA CERVICAL / VULVAR      MOLE REMOVAL  2013    SKIN BIOPSY      SPLENECTOMY  2014    SPLENECTOMY      VARICOSE VEIN SURGERY           Current Outpatient Medications:     acyclovir (ZOVIRAX) 400 MG tablet, TAKE ONE TABLET BY MOUTH DAILY, Disp: 90 tablet, Rfl: 1    betamethasone valerate (VALISONE) 0.1 % ointment, As Needed., Disp: , Rfl:     clobetasol (TEMOVATE) 0.05 % ointment, Apply 1 Application topically to the appropriate area as directed As Needed., Disp: , Rfl:     Collagen-Vitamin C-Biotin (Collagen 1500/C) 500-50-0.8 MG capsule, Take  by mouth., Disp: , Rfl:     cycloSPORINE (RESTASIS) 0.05 % ophthalmic emulsion, Administer 1 drop to both eyes 3 (Three) Times a Day., Disp: , Rfl:     dicyclomine (BENTYL) 20 MG tablet, Take 1 tablet by mouth 2 (Two) Times a Day As Needed (IBS)., Disp: 30 tablet, Rfl: 0    Digestive Enzymes capsule, Take 1 dose by mouth 3 (Three) Times a Day., Disp: , Rfl:     enalapril (VASOTEC) 5 MG tablet, TAKE 1 TABLET BY MOUTH TWICE A DAY, Disp: 180 tablet, Rfl: 1    fluticasone (FLONASE) 50 MCG/ACT nasal spray, SPRAY TWO SPRAYS IN EACH NOSTRIL ONCE DAILY, Disp: 16 mL, Rfl: 2    hydrocortisone 2.5 % cream, INSERT RECTALLY TWO TIMES A DAY AS DIRECTED BY PRESCRIBER, Disp: 30 g, Rfl: 0    LORazepam (ATIVAN) 1 MG tablet, TAKE 1 TABLET BY MOUTH DAILY AS NEEDED FOR ANXIETY, Disp: 30 tablet, Rfl: 1    Magnesium 200 MG tablet, Take 100 mg by mouth Daily., Disp: , Rfl:     Multiple Vitamins-Minerals (multivitamin with minerals) tablet tablet, Take 1  "tablet by mouth Daily., Disp: , Rfl:     polyethylene glycol (MIRALAX) powder powder, Take 17 g by mouth As Needed., Disp: , Rfl:     Probiotic Product (PROBIOTIC-10 PO), Take 1 dose by mouth Daily., Disp: , Rfl:     rosuvastatin (CRESTOR) 5 MG tablet, TAKE ONE TABLET BY MOUTH EVERY NIGHT AT BEDTIME, Disp: 90 tablet, Rfl: 2    triamcinolone (KENALOG) 0.1 % cream, , Disp: , Rfl:     vitamin C (ASCORBIC ACID) 500 MG tablet, Take 2 tablets by mouth Daily., Disp: , Rfl:     acyclovir (Zovirax) 5 % ointment, Apply 1 application topically to the appropriate area as directed Every 4 (Four) Hours. (Patient not taking: Reported on 1/29/2024), Disp: 15 g, Rfl: 0    Allergies   Allergen Reactions    Amoxicillin Hives and Swelling     Hives, swelling    Amoxicillin-Pot Clavulanate Anaphylaxis    Cefoxitin Other (See Comments)     \"blood pressure bottoms out\"    Ciprofloxacin Rash    Contrast Dye (Echo Or Unknown Ct/Mr) Hives, Nausea And Vomiting and Shortness Of Breath     (echo Or Unknown Ct/mr)    Doxycycline Nausea Only    Hydromorphone Confusion and Other (See Comments)    Hydromorphone Hcl Hallucinations and Rash    Iodinated Contrast Media Rash    Propafenone Other (See Comments)     (Rythmol)   Facial numbness    Azithromycin Unknown - High Severity    Cefuroxime GI Intolerance    Cephalexin Unknown - High Severity    Dicyclomine Hcl Dizziness    Fentanyl Other (See Comments)     Facial numbness      Sulfa Antibiotics Nausea And Vomiting    Sulfacetamide Nausea And Vomiting    Cephalosporins Rash    Latex Rash       Family History   Problem Relation Age of Onset    Lung cancer Mother     Cancer Mother     Thyroid disease Mother     Coronary artery disease Father     Heart disease Father     Hypertension Father     Early death Father     High cholesterol Father     Diabetes Paternal Grandmother        Cancer-related family history includes Cancer in her mother; Lung cancer in her mother.      Social History     Tobacco " "Use    Smoking status: Light Smoker     Packs/day: 0.50     Years: 0.00     Additional pack years: 0.00     Total pack years: 0.00     Types: Cigarettes    Smokeless tobacco: Never   Vaping Use    Vaping Use: Never used   Substance Use Topics    Alcohol use: Yes     Alcohol/week: 5.0 standard drinks of alcohol     Types: 5 Glasses of wine per week     Comment: social    Drug use: No     Social History     Social History Narrative    Not on file       ROS:   Review of Systems   Constitutional:  Positive for fatigue. Negative for fever.   HENT:  Negative for congestion and nosebleeds.    Eyes:  Positive for discharge. Negative for pain.   Respiratory:  Negative for cough and shortness of breath.    Cardiovascular:  Negative for chest pain.   Gastrointestinal:  Negative for abdominal pain, blood in stool, diarrhea, nausea and vomiting.   Endocrine: Negative for cold intolerance and heat intolerance.   Genitourinary:  Negative for difficulty urinating.   Musculoskeletal:  Negative for arthralgias.   Skin:  Negative for rash.   Neurological:  Negative for dizziness and headaches.   Hematological:  Does not bruise/bleed easily.   Psychiatric/Behavioral:  Negative for behavioral problems.        Objective:    Vital Signs:  Vitals:    01/29/24 1048   BP: 137/75   Pulse: 80   Resp: 20   Temp: 99.4 °F (37.4 °C)   SpO2: 97%   Weight: 48.4 kg (106 lb 12.8 oz)   Height: 162.6 cm (64\")       Body mass index is 18.33 kg/m².    ECOG  (0) Fully active, able to carry on all predisease performance without restriction    Physical Exam:   Physical Exam  Constitutional:       Appearance: Normal appearance.   HENT:      Head: Normocephalic and atraumatic.   Eyes:      Pupils: Pupils are equal, round, and reactive to light.   Cardiovascular:      Rate and Rhythm: Normal rate and regular rhythm.      Pulses: Normal pulses.      Heart sounds: No murmur heard.  Pulmonary:      Effort: Pulmonary effort is normal.      Breath sounds: Normal " "breath sounds.   Abdominal:      General: There is no distension.      Palpations: Abdomen is soft. There is no mass.      Tenderness: There is no abdominal tenderness.   Musculoskeletal:         General: Normal range of motion.      Cervical back: Normal range of motion.   Skin:     General: Skin is warm.   Neurological:      General: No focal deficit present.      Mental Status: She is alert.   Psychiatric:         Mood and Affect: Mood normal.     No lymphadenopathy    Lab Results - Last 18 Months   Lab Units 01/29/24  1033 11/30/23  1109 10/12/23  0931   WBC 10*3/mm3 12.45* 10.78 13.34*   HEMOGLOBIN g/dL 14.1 12.5 14.8   HEMATOCRIT % 43.1 38.8 43.5   PLATELETS 10*3/mm3 263 246 293   MCV fL 99.8* 99.7* 95.6     Lab Results - Last 18 Months   Lab Units 10/12/23  0931 08/08/23  1453 09/26/22  0927   SODIUM mmol/L 137 139 137   POTASSIUM mmol/L 3.9 4.3 3.7   CHLORIDE mmol/L 99 103 96*   CO2 mmol/L 26.0 27.0 26.5   BUN mg/dL 14 16 11   CREATININE mg/dL 0.93 0.84 0.85   CALCIUM mg/dL 10.1 9.5 9.9   BILIRUBIN mg/dL 0.5 0.3 0.5   ALK PHOS U/L 74 65 78   ALT (SGPT) U/L 19 20 16   AST (SGOT) U/L 33* 26 27   GLUCOSE mg/dL 91 93 93       Lab Results   Component Value Date    GLUCOSE 91 10/12/2023    BUN 14 10/12/2023    CREATININE 0.93 10/12/2023    EGFRIFNONA 88 07/07/2021    BCR 15.1 10/12/2023    K 3.9 10/12/2023    CO2 26.0 10/12/2023    CALCIUM 10.1 10/12/2023    ALBUMIN 4.9 10/12/2023    LABIL2 1.4 05/21/2020    AST 33 (H) 10/12/2023    ALT 19 10/12/2023       No results for input(s): \"APTT\", \"INR\", \"PTT\" in the last 79236 hours.    Lab Results   Component Value Date    IRON 88 09/10/2020    TIBC 383 09/10/2020    FERRITIN 121.00 09/10/2020       No results found for: \"FOLATE\"    No results found for: \"OCCULTBLD\"    No results found for: \"RETICCTPCT\"  Lab Results   Component Value Date    MBMMCJRN90 1,163 (H) 10/12/2023     No results found for: \"SPEP\", \"UPEP\"  LDH   Date Value Ref Range Status   07/19/2021 159 135 - " "214 U/L Final     Lab Results   Component Value Date    MELODIE Negative 09/10/2020    SEDRATE 4 03/23/2022     No results found for: \"FIBRINOGEN\", \"HAPTOGLOBIN\"  No results found for: \"PTT\", \"INR\"  No results found for: \"\"  No results found for: \"CEA\"  No components found for: \"CA-19-9\"  No results found for: \"PSA\"  Lab Results   Component Value Date    SEDRATE 4 03/23/2022          Assessment & Plan     Patient is 68-year-old female with lymphadenopathy in the left cervical area    Lymphadenopathy  This seems to be likely secondary to infection with folliculitis since this was tender now decreased in size, no pain anymore.  She does not have any constitutional symptoms, suspicion for malignancy is low  Given that this is decreasing in size I do not think we need to do an excision at this point  I will reevaluate her on follow-up and examine again.  She is advised to call us again if this continues to grow or if it does not shrink significantly within the next few weeks  Now on repeat exam lymphadenopathy    Leukocytosis  Has baseline mild leukocytosis   CRP ESR  Post splenectomy no further workup.        Thank you very much for providing the opportunity to participate in this patient’s care. Please do not hesitate to call if there are any other questions.      "

## 2024-01-29 ENCOUNTER — LAB (OUTPATIENT)
Dept: LAB | Facility: HOSPITAL | Age: 69
End: 2024-01-29
Payer: MEDICARE

## 2024-01-29 ENCOUNTER — OFFICE VISIT (OUTPATIENT)
Dept: ONCOLOGY | Facility: CLINIC | Age: 69
End: 2024-01-29
Payer: MEDICARE

## 2024-01-29 VITALS
RESPIRATION RATE: 20 BRPM | HEIGHT: 64 IN | DIASTOLIC BLOOD PRESSURE: 75 MMHG | BODY MASS INDEX: 18.23 KG/M2 | OXYGEN SATURATION: 97 % | WEIGHT: 106.8 LBS | HEART RATE: 80 BPM | TEMPERATURE: 99.4 F | SYSTOLIC BLOOD PRESSURE: 137 MMHG

## 2024-01-29 DIAGNOSIS — R59.0 CERVICAL LYMPHADENOPATHY: ICD-10-CM

## 2024-01-29 DIAGNOSIS — D72.829 LEUKOCYTOSIS, UNSPECIFIED TYPE: Primary | ICD-10-CM

## 2024-01-29 LAB
BASOPHILS # BLD AUTO: 0.09 10*3/MM3 (ref 0–0.2)
BASOPHILS NFR BLD AUTO: 0.7 % (ref 0–1.5)
DEPRECATED RDW RBC AUTO: 50.8 FL (ref 37–54)
EOSINOPHIL # BLD AUTO: 0.15 10*3/MM3 (ref 0–0.4)
EOSINOPHIL NFR BLD AUTO: 1.2 % (ref 0.3–6.2)
ERYTHROCYTE [DISTWIDTH] IN BLOOD BY AUTOMATED COUNT: 14.1 % (ref 12.3–15.4)
HCT VFR BLD AUTO: 43.1 % (ref 34–46.6)
HGB BLD-MCNC: 14.1 G/DL (ref 12–15.9)
HOLD SPECIMEN: NORMAL
HOLD SPECIMEN: NORMAL
LYMPHOCYTES # BLD AUTO: 2.87 10*3/MM3 (ref 0.7–3.1)
LYMPHOCYTES NFR BLD AUTO: 23.1 % (ref 19.6–45.3)
MCH RBC QN AUTO: 32.6 PG (ref 26.6–33)
MCHC RBC AUTO-ENTMCNC: 32.7 G/DL (ref 31.5–35.7)
MCV RBC AUTO: 99.8 FL (ref 79–97)
MONOCYTES # BLD AUTO: 1.29 10*3/MM3 (ref 0.1–0.9)
MONOCYTES NFR BLD AUTO: 10.4 % (ref 5–12)
NEUTROPHILS NFR BLD AUTO: 64.6 % (ref 42.7–76)
NEUTROPHILS NFR BLD AUTO: 8.05 10*3/MM3 (ref 1.7–7)
PLATELET # BLD AUTO: 263 10*3/MM3 (ref 140–450)
PMV BLD AUTO: 11.6 FL (ref 6–12)
RBC # BLD AUTO: 4.32 10*6/MM3 (ref 3.77–5.28)
WBC NRBC COR # BLD AUTO: 12.45 10*3/MM3 (ref 3.4–10.8)

## 2024-01-29 PROCEDURE — 36415 COLL VENOUS BLD VENIPUNCTURE: CPT

## 2024-01-29 PROCEDURE — 3075F SYST BP GE 130 - 139MM HG: CPT | Performed by: INTERNAL MEDICINE

## 2024-01-29 PROCEDURE — 85025 COMPLETE CBC W/AUTO DIFF WBC: CPT

## 2024-01-29 PROCEDURE — 99213 OFFICE O/P EST LOW 20 MIN: CPT | Performed by: INTERNAL MEDICINE

## 2024-01-29 PROCEDURE — 3078F DIAST BP <80 MM HG: CPT | Performed by: INTERNAL MEDICINE

## 2024-01-29 PROCEDURE — 1126F AMNT PAIN NOTED NONE PRSNT: CPT | Performed by: INTERNAL MEDICINE

## 2024-02-10 DIAGNOSIS — I70.90 ATHEROSCLEROSIS: ICD-10-CM

## 2024-02-10 DIAGNOSIS — I73.9 PERIPHERAL ARTERIAL DISEASE: ICD-10-CM

## 2024-02-10 RX ORDER — ROSUVASTATIN CALCIUM 5 MG/1
TABLET, COATED ORAL
Qty: 90 TABLET | Refills: 2 | Status: SHIPPED | OUTPATIENT
Start: 2024-02-10

## 2024-03-12 DIAGNOSIS — F41.9 ANXIETY: ICD-10-CM

## 2024-03-12 RX ORDER — LORAZEPAM 1 MG/1
TABLET ORAL
Qty: 30 TABLET | Refills: 0 | Status: SHIPPED | OUTPATIENT
Start: 2024-03-12

## 2024-04-12 DIAGNOSIS — F41.9 ANXIETY: ICD-10-CM

## 2024-04-13 RX ORDER — LORAZEPAM 1 MG/1
TABLET ORAL
Qty: 30 TABLET | Refills: 0 | Status: SHIPPED | OUTPATIENT
Start: 2024-04-13

## 2024-05-08 ENCOUNTER — OFFICE VISIT (OUTPATIENT)
Age: 69
End: 2024-05-08
Payer: MEDICARE

## 2024-05-08 ENCOUNTER — HOSPITAL ENCOUNTER (OUTPATIENT)
Dept: CARDIOLOGY | Facility: HOSPITAL | Age: 69
Discharge: HOME | End: 2024-05-08
Payer: MEDICARE

## 2024-05-08 VITALS
WEIGHT: 109 LBS | SYSTOLIC BLOOD PRESSURE: 118 MMHG | DIASTOLIC BLOOD PRESSURE: 59 MMHG | BODY MASS INDEX: 19.31 KG/M2 | HEART RATE: 87 BPM | HEIGHT: 63 IN

## 2024-05-08 DIAGNOSIS — I73.9 PERIPHERAL ARTERIAL DISEASE: Primary | ICD-10-CM

## 2024-05-08 DIAGNOSIS — I77.811 AORTIC ECTASIA, ABDOMINAL: ICD-10-CM

## 2024-05-08 DIAGNOSIS — I65.23 BILATERAL CAROTID ARTERY STENOSIS: ICD-10-CM

## 2024-05-08 DIAGNOSIS — R09.89 BRUIT: ICD-10-CM

## 2024-05-08 DIAGNOSIS — I10 ESSENTIAL HYPERTENSION: ICD-10-CM

## 2024-05-08 DIAGNOSIS — I73.9 PERIPHERAL VASCULAR DISEASE, UNSPECIFIED: ICD-10-CM

## 2024-05-08 DIAGNOSIS — E78.5 DYSLIPIDEMIA: ICD-10-CM

## 2024-05-08 DIAGNOSIS — I71.43 INFRARENAL ABDOMINAL AORTIC ANEURYSM (AAA) WITHOUT RUPTURE: ICD-10-CM

## 2024-05-08 DIAGNOSIS — F17.200 TOBACCO DEPENDENCE: ICD-10-CM

## 2024-05-08 LAB
ABDOMINAL DIST AORTA AP: 1.9 CM
ABDOMINAL DIST AORTA TRANS: 1.9 CM
ABDOMINAL DIST AORTA VEL: 38 CM/S
ABDOMINAL LT COM ILIAC AP: 1.1 CM
ABDOMINAL LT COM ILIAC TRANS: 0.98 CM
ABDOMINAL LT COM ILIAC VEL: 63 CM/S
ABDOMINAL LT EXT ILIAC VEL: 90 CM/S
ABDOMINAL MID AORTA AP: 2.1 CM
ABDOMINAL MID AORTA TRANS: 1.9 CM
ABDOMINAL MID AORTA VEL: 53 CM/S
ABDOMINAL PROX AORTA AP: 2.6 CM
ABDOMINAL PROX AORTA TRANS: 2.6 CM
ABDOMINAL PROX AORTA VEL: 50 CM/S
ABDOMINAL RT COM ILIAC AP: 1.2 CM
ABDOMINAL RT COM ILIAC TRANS: 1 CM
ABDOMINAL RT COM ILIAC VEL: 102 CM/S
ABDOMINAL RT EXT ILIAC VEL: 120 CM/S
BH CV LOWER ARTERIAL LEFT ABI RATIO: 0.95
BH CV LOWER ARTERIAL LEFT DORSALIS PEDIS SYS MAX: 140
BH CV LOWER ARTERIAL LEFT GREAT TOE SYS MAX: 104
BH CV LOWER ARTERIAL LEFT POST TIBIAL SYS MAX: 130
BH CV LOWER ARTERIAL LEFT TBI RATIO: 0.7
BH CV LOWER ARTERIAL RIGHT ABI RATIO: 1.03
BH CV LOWER ARTERIAL RIGHT DORSALIS PEDIS SYS MAX: 152
BH CV LOWER ARTERIAL RIGHT GREAT TOE SYS MAX: 116
BH CV LOWER ARTERIAL RIGHT POST TIBIAL SYS MAX: 142
BH CV LOWER ARTERIAL RIGHT TBI RATIO: 0.78
BH CV VAS ABD AO LT EXTERNAL ILIAC AP: 0.71 CM
BH CV VAS ABD AO LT EXTERNAL ILIAC TRANS: 0.56 CM
BH CV VAS ABD AO RT EXTERNAL ILIAC AP: 0.55 CM
BH CV VAS ABD AO RT EXTERNAL ILIAC TRANS: 0.54 CM
BH CV XLRA MEAS LEFT DIST CCA EDV: 18 CM/SEC
BH CV XLRA MEAS LEFT DIST CCA PSV: 58.4 CM/SEC
BH CV XLRA MEAS LEFT DIST ICA EDV: -25.6 CM/SEC
BH CV XLRA MEAS LEFT DIST ICA PSV: -85.5 CM/SEC
BH CV XLRA MEAS LEFT ICA/CCA RATIO: -1.29
BH CV XLRA MEAS LEFT MID CCA EDV: 19.2 CM/SEC
BH CV XLRA MEAS LEFT MID CCA PSV: 66.2 CM/SEC
BH CV XLRA MEAS LEFT PROX CCA EDV: 12.5 CM/SEC
BH CV XLRA MEAS LEFT PROX CCA PSV: 65.9 CM/SEC
BH CV XLRA MEAS LEFT PROX ECA PSV: -60.2 CM/SEC
BH CV XLRA MEAS LEFT PROX ICA EDV: -31.9 CM/SEC
BH CV XLRA MEAS LEFT PROX ICA PSV: -69.3 CM/SEC
BH CV XLRA MEAS LEFT PROX SCLA PSV: 75.7 CM/SEC
BH CV XLRA MEAS LEFT VERTEBRAL A EDV: 15.2 CM/SEC
BH CV XLRA MEAS LEFT VERTEBRAL A PSV: 52.1 CM/SEC
BH CV XLRA MEAS RIGHT DIST CCA EDV: 17.6 CM/SEC
BH CV XLRA MEAS RIGHT DIST CCA PSV: 54.5 CM/SEC
BH CV XLRA MEAS RIGHT DIST ICA EDV: -21.7 CM/SEC
BH CV XLRA MEAS RIGHT DIST ICA PSV: -59.8 CM/SEC
BH CV XLRA MEAS RIGHT ICA/CCA RATIO: -0.96
BH CV XLRA MEAS RIGHT PROX CCA EDV: 10.6 CM/SEC
BH CV XLRA MEAS RIGHT PROX CCA PSV: 62.3 CM/SEC
BH CV XLRA MEAS RIGHT PROX ECA PSV: -68.2 CM/SEC
BH CV XLRA MEAS RIGHT PROX ICA EDV: 14.1 CM/SEC
BH CV XLRA MEAS RIGHT PROX ICA PSV: 43.5 CM/SEC
BH CV XLRA MEAS RIGHT PROX SCLA PSV: 120 CM/SEC
BH CV XLRA MEAS RIGHT VERTEBRAL A EDV: 12.5 CM/SEC
BH CV XLRA MEAS RIGHT VERTEBRAL A PSV: 51.7 CM/SEC
LEFT ARM BP: 133 MMHG
RIGHT ARM BP: 148 MMHG
UPPER ARTERIAL LEFT ARM BRACHIAL SYS MAX: 133
UPPER ARTERIAL RIGHT ARM BRACHIAL SYS MAX: 148

## 2024-05-08 PROCEDURE — 93978 VASCULAR STUDY: CPT

## 2024-05-08 PROCEDURE — 93880 EXTRACRANIAL BILAT STUDY: CPT

## 2024-05-08 PROCEDURE — 93922 UPR/L XTREMITY ART 2 LEVELS: CPT

## 2024-05-08 RX ORDER — PREDNISONE 10 MG/1
15 TABLET ORAL DAILY
COMMUNITY
Start: 2024-04-30

## 2024-05-13 DIAGNOSIS — F41.9 ANXIETY: ICD-10-CM

## 2024-05-13 RX ORDER — LORAZEPAM 1 MG/1
TABLET ORAL
Qty: 30 TABLET | Refills: 0 | Status: SHIPPED | OUTPATIENT
Start: 2024-05-13

## 2024-06-09 DIAGNOSIS — I10 ESSENTIAL HYPERTENSION: ICD-10-CM

## 2024-06-09 RX ORDER — ENALAPRIL MALEATE 5 MG/1
5 TABLET ORAL 2 TIMES DAILY
Qty: 180 TABLET | Refills: 1 | Status: SHIPPED | OUTPATIENT
Start: 2024-06-09

## 2024-06-10 ENCOUNTER — OFFICE VISIT (OUTPATIENT)
Dept: FAMILY MEDICINE CLINIC | Facility: CLINIC | Age: 69
End: 2024-06-10
Payer: MEDICARE

## 2024-06-10 ENCOUNTER — LAB (OUTPATIENT)
Dept: FAMILY MEDICINE CLINIC | Facility: CLINIC | Age: 69
End: 2024-06-10
Payer: MEDICARE

## 2024-06-10 VITALS
OXYGEN SATURATION: 97 % | HEART RATE: 81 BPM | BODY MASS INDEX: 18.61 KG/M2 | RESPIRATION RATE: 16 BRPM | HEIGHT: 63 IN | WEIGHT: 105 LBS | TEMPERATURE: 97.5 F | DIASTOLIC BLOOD PRESSURE: 77 MMHG | SYSTOLIC BLOOD PRESSURE: 122 MMHG

## 2024-06-10 DIAGNOSIS — E55.9 VITAMIN D DEFICIENCY: ICD-10-CM

## 2024-06-10 DIAGNOSIS — R53.83 OTHER FATIGUE: Primary | ICD-10-CM

## 2024-06-10 DIAGNOSIS — G44.52 NEW DAILY PERSISTENT HEADACHE: ICD-10-CM

## 2024-06-10 LAB
25(OH)D3 SERPL-MCNC: 43.2 NG/ML (ref 30–100)
ALBUMIN SERPL-MCNC: 4.4 G/DL (ref 3.5–5.2)
ALBUMIN/GLOB SERPL: 1.5 G/DL
ALP SERPL-CCNC: 65 U/L (ref 39–117)
ALT SERPL W P-5'-P-CCNC: 15 U/L (ref 1–33)
ANION GAP SERPL CALCULATED.3IONS-SCNC: 12.2 MMOL/L (ref 5–15)
AST SERPL-CCNC: 27 U/L (ref 1–32)
BASOPHILS # BLD AUTO: 0.11 10*3/MM3 (ref 0–0.2)
BASOPHILS NFR BLD AUTO: 1.1 % (ref 0–1.5)
BILIRUB SERPL-MCNC: 0.4 MG/DL (ref 0–1.2)
BILIRUB UR QL STRIP: NEGATIVE
BUN SERPL-MCNC: 14 MG/DL (ref 8–23)
BUN/CREAT SERPL: 17.1 (ref 7–25)
CALCIUM SPEC-SCNC: 9.7 MG/DL (ref 8.6–10.5)
CHLORIDE SERPL-SCNC: 99 MMOL/L (ref 98–107)
CLARITY UR: CLEAR
CO2 SERPL-SCNC: 25.8 MMOL/L (ref 22–29)
COLOR UR: YELLOW
CREAT SERPL-MCNC: 0.82 MG/DL (ref 0.57–1)
DEPRECATED RDW RBC AUTO: 43.4 FL (ref 37–54)
EGFRCR SERPLBLD CKD-EPI 2021: 77.1 ML/MIN/1.73
EOSINOPHIL # BLD AUTO: 0.06 10*3/MM3 (ref 0–0.4)
EOSINOPHIL NFR BLD AUTO: 0.6 % (ref 0.3–6.2)
ERYTHROCYTE [DISTWIDTH] IN BLOOD BY AUTOMATED COUNT: 12.4 % (ref 12.3–15.4)
GLOBULIN UR ELPH-MCNC: 3 GM/DL
GLUCOSE SERPL-MCNC: 103 MG/DL (ref 65–99)
GLUCOSE UR STRIP-MCNC: NEGATIVE MG/DL
HCT VFR BLD AUTO: 42.7 % (ref 34–46.6)
HGB BLD-MCNC: 14.7 G/DL (ref 12–15.9)
HGB UR QL STRIP.AUTO: NEGATIVE
HOLD SPECIMEN: NORMAL
IMM GRANULOCYTES # BLD AUTO: 0.03 10*3/MM3 (ref 0–0.05)
IMM GRANULOCYTES NFR BLD AUTO: 0.3 % (ref 0–0.5)
KETONES UR QL STRIP: NEGATIVE
LEUKOCYTE ESTERASE UR QL STRIP.AUTO: NEGATIVE
LYMPHOCYTES # BLD AUTO: 2.59 10*3/MM3 (ref 0.7–3.1)
LYMPHOCYTES NFR BLD AUTO: 25.3 % (ref 19.6–45.3)
MCH RBC QN AUTO: 32.9 PG (ref 26.6–33)
MCHC RBC AUTO-ENTMCNC: 34.4 G/DL (ref 31.5–35.7)
MCV RBC AUTO: 95.5 FL (ref 79–97)
MONOCYTES # BLD AUTO: 0.84 10*3/MM3 (ref 0.1–0.9)
MONOCYTES NFR BLD AUTO: 8.2 % (ref 5–12)
NEUTROPHILS NFR BLD AUTO: 6.62 10*3/MM3 (ref 1.7–7)
NEUTROPHILS NFR BLD AUTO: 64.5 % (ref 42.7–76)
NITRITE UR QL STRIP: NEGATIVE
NRBC BLD AUTO-RTO: 0 /100 WBC (ref 0–0.2)
PH UR STRIP.AUTO: 7.5 [PH] (ref 5–8)
PLATELET # BLD AUTO: 258 10*3/MM3 (ref 140–450)
PMV BLD AUTO: 12.5 FL (ref 6–12)
POTASSIUM SERPL-SCNC: 4.4 MMOL/L (ref 3.5–5.2)
PROT SERPL-MCNC: 7.4 G/DL (ref 6–8.5)
PROT UR QL STRIP: NEGATIVE
RBC # BLD AUTO: 4.47 10*6/MM3 (ref 3.77–5.28)
SODIUM SERPL-SCNC: 137 MMOL/L (ref 136–145)
SP GR UR STRIP: 1.01 (ref 1–1.03)
TSH SERPL DL<=0.05 MIU/L-ACNC: 1.35 UIU/ML (ref 0.27–4.2)
UROBILINOGEN UR QL STRIP: NORMAL
VIT B12 BLD-MCNC: 1280 PG/ML (ref 211–946)
WBC NRBC COR # BLD AUTO: 10.25 10*3/MM3 (ref 3.4–10.8)

## 2024-06-10 PROCEDURE — 80053 COMPREHEN METABOLIC PANEL: CPT | Performed by: FAMILY MEDICINE

## 2024-06-10 PROCEDURE — 85025 COMPLETE CBC W/AUTO DIFF WBC: CPT | Performed by: FAMILY MEDICINE

## 2024-06-10 PROCEDURE — 3074F SYST BP LT 130 MM HG: CPT | Performed by: FAMILY MEDICINE

## 2024-06-10 PROCEDURE — 1126F AMNT PAIN NOTED NONE PRSNT: CPT | Performed by: FAMILY MEDICINE

## 2024-06-10 PROCEDURE — 99213 OFFICE O/P EST LOW 20 MIN: CPT | Performed by: FAMILY MEDICINE

## 2024-06-10 PROCEDURE — 36415 COLL VENOUS BLD VENIPUNCTURE: CPT | Performed by: FAMILY MEDICINE

## 2024-06-10 PROCEDURE — 82607 VITAMIN B-12: CPT | Performed by: FAMILY MEDICINE

## 2024-06-10 PROCEDURE — 84443 ASSAY THYROID STIM HORMONE: CPT | Performed by: FAMILY MEDICINE

## 2024-06-10 PROCEDURE — 81003 URINALYSIS AUTO W/O SCOPE: CPT | Performed by: FAMILY MEDICINE

## 2024-06-10 PROCEDURE — 82306 VITAMIN D 25 HYDROXY: CPT | Performed by: FAMILY MEDICINE

## 2024-06-10 PROCEDURE — 3078F DIAST BP <80 MM HG: CPT | Performed by: FAMILY MEDICINE

## 2024-06-10 NOTE — PROGRESS NOTES
"Subjective   Chief Complaint   Patient presents with    Headache    Fatigue     Shauna Marks is a 69 y.o. female.     Patient Care Team:  Talia Grey MD as PCP - General (Family Medicine)  Asa Greenwood MD as Consulting Physician (Vascular Surgery)  Yuliet Segundo MD as Consulting Physician (Hematology and Oncology)  Tasneem Macias OD (Optometry)    History of Present Illness  She is coming in today as she has not been feeling well for the last week or so.  She reports that every day in the morning she wakes up with a headache, but then after she drinks coffee and later on her headaches subside.  She also has been experiencing a lot of fatigue and tiredness.  She has been taking Advil as needed with only minimal improvement.  Patient tells me that over a month ago she was diagnosed with a \"sun allergy\" by her dermatologist and she was started on the prednisone taper which she stayed on for about a month and finished that on 2024 and later on her symptoms started developing.  She also had some chills, but no fever.       The following portions of the patient's history were reviewed and updated as appropriate: allergies, current medications, past family history, past medical history, past social history, past surgical history, and problem list.  Past Medical History:   Diagnosis Date    Allergic     Anxiety     Blepharitis, right eye     Colon polyp     Diverticulosis     Dry eyes     Genital herpes     GERD (gastroesophageal reflux disease)     Goiter     Headache     History of transfusion     Hyperlipidemia     Hypertension     IBS (irritable bowel syndrome)     Leg cramps     Osteopenia 2015    Osteoporosis     Pneumonia 2019    Radiculopathy of cervical spine     Skin disorder     Visual impairment     Vulva cancer     Dr. Patel     Past Surgical History:   Procedure Laterality Date    BREAST SURGERY      biopsies    CARDIAC CATHETERIZATION       SECTION      COLONOSCOPY  " 2014    COLONOSCOPY N/A 10/25/2021    Procedure: COLONOSCOPY WITH POLYPECTOMY X11;  Surgeon: Sudarshan Mitchell MD;  Location: [facility] ENDOSCOPY;  Service: Gastroenterology;  Laterality: N/A;  colon polyps, diverticulosis    COLONOSCOPY W/ POLYPECTOMY N/A 10/28/2020    Procedure: Colonoscopy with polypectomy x 10;  Surgeon: Sudarshan Mitchell MD;  Location: [facility] ENDOSCOPY;  Service: Gastroenterology;  Laterality: N/A;  colon polyps x 10,  hemorrhoids    COSMETIC SURGERY  2022    Ptosis    DILATATION AND CURETTAGE  2009    and 2016    HERNIA REPAIR      LASER ABLATION CONDYLOMA CERVICAL / VULVAR      MOLE REMOVAL  2013    SKIN BIOPSY      SPLENECTOMY  2014    SPLENECTOMY      VARICOSE VEIN SURGERY       The patient has a family history of  Family History   Problem Relation Age of Onset    Lung cancer Mother     Cancer Mother     Thyroid disease Mother     Coronary artery disease Father     Heart disease Father     Hypertension Father     Early death Father     High cholesterol Father     Diabetes Paternal Grandmother      Social History     Socioeconomic History    Marital status: Significant Other    Number of children: 2    Highest education level: Some college, no degree   Tobacco Use    Smoking status: Light Smoker     Current packs/day: 0.50     Types: Cigarettes     Passive exposure: Current    Smokeless tobacco: Never   Vaping Use    Vaping status: Never Used   Substance and Sexual Activity    Alcohol use: Yes     Alcohol/week: 5.0 standard drinks of alcohol     Types: 5 Glasses of wine per week     Comment: social    Drug use: No    Sexual activity: Not Currently     Partners: Male       Review of Systems   Constitutional:  Positive for chills and fatigue. Negative for activity change, appetite change and fever.   HENT:  Negative for congestion, ear pain, postnasal drip, sinus pressure, sore throat and swollen glands.    Respiratory:  Negative for cough, choking, chest tightness, shortness of breath,  "wheezing and stridor.    Cardiovascular:  Negative for chest pain.   Skin:  Negative for dry skin and rash.   Neurological:  Positive for headache.     Visit Vitals  /77 (BP Location: Left arm, Patient Position: Sitting, Cuff Size: Adult)   Pulse 81   Temp 97.5 °F (36.4 °C) (Infrared)   Resp 16   Ht 160 cm (62.99\")   Wt 47.6 kg (105 lb)   LMP  (LMP Unknown)   SpO2 97%   BMI 18.60 kg/m²       BMI is within normal parameters. No other follow-up for BMI required.      Current Outpatient Medications:     acyclovir (ZOVIRAX) 400 MG tablet, TAKE ONE TABLET BY MOUTH DAILY, Disp: 90 tablet, Rfl: 1    betamethasone valerate (VALISONE) 0.1 % ointment, As Needed., Disp: , Rfl:     clobetasol (TEMOVATE) 0.05 % ointment, Apply 1 Application topically to the appropriate area as directed As Needed., Disp: , Rfl:     cycloSPORINE (RESTASIS) 0.05 % ophthalmic emulsion, Administer 1 drop to both eyes 3 (Three) Times a Day., Disp: , Rfl:     dicyclomine (BENTYL) 20 MG tablet, Take 1 tablet by mouth 2 (Two) Times a Day As Needed (IBS)., Disp: 30 tablet, Rfl: 0    Digestive Enzymes capsule, Take 1 dose by mouth 3 (Three) Times a Day., Disp: , Rfl:     enalapril (VASOTEC) 5 MG tablet, TAKE 1 TABLET BY MOUTH TWICE A DAY, Disp: 180 tablet, Rfl: 1    fluticasone (FLONASE) 50 MCG/ACT nasal spray, SPRAY TWO SPRAYS IN EACH NOSTRIL ONCE DAILY, Disp: 16 mL, Rfl: 2    LORazepam (ATIVAN) 1 MG tablet, TAKE 1 TABLET BY MOUTH DAILY AS NEEDED FOR ANXIETY, Disp: 30 tablet, Rfl: 0    Magnesium 200 MG tablet, Take 100 mg by mouth Daily., Disp: , Rfl:     Multiple Vitamins-Minerals (multivitamin with minerals) tablet tablet, Take 1 tablet by mouth Daily., Disp: , Rfl:     polyethylene glycol (MIRALAX) powder powder, Take 17 g by mouth As Needed., Disp: , Rfl:     Probiotic Product (PROBIOTIC-10 PO), Take 1 dose by mouth Daily., Disp: , Rfl:     rosuvastatin (CRESTOR) 5 MG tablet, TAKE ONE TABLET BY MOUTH EVERY NIGHT AT BEDTIME, Disp: 90 tablet, Rfl: " 2    vitamin C (ASCORBIC ACID) 500 MG tablet, Take 2 tablets by mouth Daily., Disp: , Rfl:     Objective   Physical Exam  Vitals and nursing note reviewed.   Constitutional:       General: She is not in acute distress.     Appearance: She is well-developed.   HENT:      Head: Normocephalic and atraumatic.      Right Ear: External ear normal.      Left Ear: External ear normal.      Mouth/Throat:      Pharynx: No oropharyngeal exudate.   Eyes:      Conjunctiva/sclera: Conjunctivae normal.      Pupils: Pupils are equal, round, and reactive to light.   Cardiovascular:      Rate and Rhythm: Normal rate and regular rhythm.      Heart sounds: Normal heart sounds.   Pulmonary:      Effort: Pulmonary effort is normal. No respiratory distress.      Breath sounds: Normal breath sounds. No wheezing or rales.   Musculoskeletal:      Cervical back: Normal range of motion and neck supple.   Skin:     General: Skin is warm and dry.      Findings: No rash.         Assessment & Plan   Diagnoses and all orders for this visit:    1. Other fatigue (Primary)  -     CBC Auto Differential  -     Comprehensive Metabolic Panel  -     Vitamin B12  -     Urinalysis With Culture If Indicated - Urine, Clean Catch  -     TSH  -     [place] Urine Culture Tube - Urine, Clean Catch    2. New daily persistent headache    3. Vitamin D deficiency  -     Vitamin D,25-Hydroxy      Her exam today is intact and her vital signs are stable.  I will be getting labs to rule out medical causes of her symptoms and I will advise the patient once these are available.        Return if symptoms worsen or fail to improve, for Recheck.    Requested Prescriptions      No prescriptions requested or ordered in this encounter       Talia Grey MD  06/10/2024

## 2024-06-11 DIAGNOSIS — F41.9 ANXIETY: ICD-10-CM

## 2024-06-12 RX ORDER — LORAZEPAM 1 MG/1
TABLET ORAL
Qty: 30 TABLET | Refills: 1 | Status: SHIPPED | OUTPATIENT
Start: 2024-06-12

## 2024-06-17 ENCOUNTER — OFFICE (OUTPATIENT)
Dept: URBAN - METROPOLITAN AREA CLINIC 64 | Facility: CLINIC | Age: 69
End: 2024-06-17

## 2024-06-17 VITALS
HEART RATE: 76 BPM | SYSTOLIC BLOOD PRESSURE: 126 MMHG | WEIGHT: 106 LBS | DIASTOLIC BLOOD PRESSURE: 83 MMHG | HEIGHT: 64 IN

## 2024-06-17 DIAGNOSIS — R10.31 RIGHT LOWER QUADRANT PAIN: ICD-10-CM

## 2024-06-17 DIAGNOSIS — K59.00 CONSTIPATION, UNSPECIFIED: ICD-10-CM

## 2024-06-17 DIAGNOSIS — Z86.010 PERSONAL HISTORY OF COLONIC POLYPS: ICD-10-CM

## 2024-06-17 PROCEDURE — 99214 OFFICE O/P EST MOD 30 MIN: CPT | Performed by: INTERNAL MEDICINE

## 2024-06-22 RX ORDER — ACYCLOVIR 400 MG/1
TABLET ORAL
Qty: 90 TABLET | Refills: 1 | Status: SHIPPED | OUTPATIENT
Start: 2024-06-22

## 2024-08-10 DIAGNOSIS — F41.9 ANXIETY: ICD-10-CM

## 2024-08-12 RX ORDER — LORAZEPAM 1 MG/1
TABLET ORAL
Qty: 30 TABLET | Refills: 0 | Status: SHIPPED | OUTPATIENT
Start: 2024-08-12

## 2024-08-13 ENCOUNTER — TELEPHONE (OUTPATIENT)
Dept: FAMILY MEDICINE CLINIC | Facility: CLINIC | Age: 69
End: 2024-08-13

## 2024-08-13 NOTE — TELEPHONE ENCOUNTER
LM ON VM LETTING HER KNOW I RETURNED HER CALL AND WHAT SHE NEEDED TO DO IF SHE WAS JUST WANTING TO GET HER RECORDS TRANSFERRED, BUT IF SHE NEEDED ANYTHING ELSE, SHE COULD  CALL ME BACK.

## 2024-08-13 NOTE — TELEPHONE ENCOUNTER
Caller: Shauna Marks    Relationship: Self    Best call back number: [phone]    What was the call regarding: PATIENT IS MOVING OUT OF STATE AND WOULD LIKE TO TALK TO RAMONA ABOUT SOME THINGS. PLEASE RETURN CALL ASAP.

## 2024-09-05 DIAGNOSIS — F41.9 ANXIETY: ICD-10-CM

## 2024-09-05 RX ORDER — LORAZEPAM 1 MG/1
TABLET ORAL
Qty: 30 TABLET | OUTPATIENT
Start: 2024-09-05

## 2024-09-10 DIAGNOSIS — F41.9 ANXIETY: ICD-10-CM

## 2024-09-11 RX ORDER — LORAZEPAM 1 MG/1
TABLET ORAL
Qty: 30 TABLET | OUTPATIENT
Start: 2024-09-11

## 2024-09-12 NOTE — TELEPHONE ENCOUNTER
Caller: Shauna Marks    Relationship: Self    Best call back number: [phone]    What is the best time to reach you: ANYTIME     Who are you requesting to speak with (clinical staff, provider,  specific staff member): RAMONA    What was the call regarding: MOVING/ MEDICAL RECORDS    PATIENT WOULD LIKE RAMONA TO CALL HER BACK ASAP

## 2024-09-13 DIAGNOSIS — F41.9 ANXIETY: ICD-10-CM

## 2024-09-13 RX ORDER — LORAZEPAM 1 MG/1
1 TABLET ORAL DAILY PRN
Qty: 30 TABLET | Refills: 0 | Status: SHIPPED | OUTPATIENT
Start: 2024-09-13

## 2024-10-09 DIAGNOSIS — F41.9 ANXIETY: ICD-10-CM

## 2024-10-09 RX ORDER — LORAZEPAM 1 MG/1
1 TABLET ORAL DAILY PRN
Qty: 30 TABLET | Refills: 0 | Status: SHIPPED | OUTPATIENT
Start: 2024-10-09

## 2024-10-10 ENCOUNTER — TELEPHONE (OUTPATIENT)
Dept: FAMILY MEDICINE CLINIC | Facility: CLINIC | Age: 69
End: 2024-10-10

## 2024-10-10 RX ORDER — DOXYCYCLINE HYCLATE 50 MG/1
50 CAPSULE ORAL 2 TIMES DAILY
Qty: 14 CAPSULE | Refills: 0 | Status: SHIPPED | OUTPATIENT
Start: 2024-10-10

## 2024-10-10 NOTE — TELEPHONE ENCOUNTER
Spoke to Pharmacy. I let them know that the need to fill the 50mg Doxycycline and Dr. Grey didn't call in any other strength of the medication. Pharmacy associate state they had a 50 mg and 100 mg from Dr. Grey. They ask if the 100mg doxycycline need to be canceled. I said yes.

## 2024-10-10 NOTE — TELEPHONE ENCOUNTER
Caller: [id number] - [place], [place] - [address] - [phone] [facility] [phone] FX    Relationship: Pharmacy    Best call back number: [phone]    What is the best time to reach you: ANYTIME    Who are you requesting to speak with (clinical staff, provider,  specific staff member): ANYTIME    Do you know the name of the person who called: CARLOS     What was the call regarding: PATIENTS PHARMACY IS REQUESTING CLARIFICATION ON THE PATIENTS DOXYCYCLINE.     CARLOS STATES THAT THE PRESCRIPTION THEY GOT  ELECTRONICALLY  WAS FOR 50MG AND THE PRESCRIPTION THAT WAS CALLED IN WAS FOR 100MG.     PLEASE ADVISE

## 2024-10-10 NOTE — TELEPHONE ENCOUNTER
I sent a prescription for doxycycline 50 mg and this is the prescription which needs to be filled.  I did not call in any other strength for the patient.  Thank you.

## 2024-10-12 RX ORDER — DOXYCYCLINE HYCLATE 50 MG/1
50 CAPSULE ORAL 2 TIMES DAILY
Qty: 14 CAPSULE | Refills: 0 | OUTPATIENT
Start: 2024-10-12

## 2024-11-15 DIAGNOSIS — I70.90 ATHEROSCLEROSIS: ICD-10-CM

## 2024-11-15 DIAGNOSIS — I73.9 PERIPHERAL ARTERIAL DISEASE: ICD-10-CM

## 2024-11-15 RX ORDER — ROSUVASTATIN CALCIUM 5 MG/1
5 TABLET, COATED ORAL
Qty: 90 TABLET | Refills: 0 | Status: SHIPPED | OUTPATIENT
Start: 2024-11-15

## 2024-11-22 DIAGNOSIS — I10 ESSENTIAL HYPERTENSION: ICD-10-CM

## 2024-11-22 RX ORDER — ENALAPRIL MALEATE 5 MG/1
5 TABLET ORAL 2 TIMES DAILY
Qty: 180 TABLET | Refills: 0 | Status: SHIPPED | OUTPATIENT
Start: 2024-11-22

## 2024-12-17 DIAGNOSIS — I71.43 INFRARENAL ABDOMINAL AORTIC ANEURYSM (AAA) WITHOUT RUPTURE: ICD-10-CM

## 2024-12-17 DIAGNOSIS — I65.23 BILATERAL CAROTID ARTERY OCCLUSION: Primary | ICD-10-CM

## 2024-12-17 DIAGNOSIS — I73.9 PERIPHERAL VASCULAR DISEASE, UNSPECIFIED: ICD-10-CM

## 2025-03-08 DIAGNOSIS — I73.9 PERIPHERAL ARTERIAL DISEASE: ICD-10-CM

## 2025-03-08 DIAGNOSIS — I70.90 ATHEROSCLEROSIS: ICD-10-CM

## 2025-03-09 RX ORDER — ROSUVASTATIN CALCIUM 5 MG/1
5 TABLET, COATED ORAL
Qty: 90 TABLET | Refills: 0 | OUTPATIENT
Start: 2025-03-09

## (undated) DEVICE — SNAR POLYP CAPTIVATOR OVL 13MM 240CM

## (undated) DEVICE — SNAR POLYP HOTSNARE/BRAIDED OVL/MINI 7F 2.8X10MM 230CM 1P/U

## (undated) DEVICE — SINGLE-USE BIOPSY FORCEPS: Brand: RADIAL JAW 4

## (undated) DEVICE — PK ENDO GI 50

## (undated) DEVICE — TRAP WIDEEYE POLYP

## (undated) DEVICE — 3M™ PATIENT PLATE, CORDED, SPLIT, LARGE, 40 PER CASE, 1179: Brand: 3M™